# Patient Record
Sex: MALE | Race: WHITE | Employment: FULL TIME | URBAN - METROPOLITAN AREA
[De-identification: names, ages, dates, MRNs, and addresses within clinical notes are randomized per-mention and may not be internally consistent; named-entity substitution may affect disease eponyms.]

---

## 2022-04-11 ENCOUNTER — HOSPITAL ENCOUNTER (OUTPATIENT)
Dept: CT IMAGING | Facility: HOSPITAL | Age: 67
Discharge: HOME | End: 2022-04-11
Attending: INTERNAL MEDICINE
Payer: MEDICARE

## 2022-04-11 DIAGNOSIS — R31.0 GROSS HEMATURIA: ICD-10-CM

## 2022-04-11 LAB — CREAT BLD-MCNC: 0.8 MG/DL

## 2022-04-11 PROCEDURE — 74178 CT ABD&PLV WO CNTR FLWD CNTR: CPT | Performed by: INTERNAL MEDICINE

## 2022-04-11 PROCEDURE — 76377 3D RENDER W/INTRP POSTPROCES: CPT | Performed by: INTERNAL MEDICINE

## 2022-04-11 PROCEDURE — 82565 ASSAY OF CREATININE: CPT

## 2022-06-24 ENCOUNTER — APPOINTMENT (OUTPATIENT)
Dept: URBAN - METROPOLITAN AREA CLINIC 244 | Age: 67
Setting detail: DERMATOLOGY
End: 2022-06-28

## 2022-06-24 DIAGNOSIS — L82.1 OTHER SEBORRHEIC KERATOSIS: ICD-10-CM

## 2022-06-24 DIAGNOSIS — L57.0 ACTINIC KERATOSIS: ICD-10-CM

## 2022-06-24 DIAGNOSIS — D22 MELANOCYTIC NEVI: ICD-10-CM

## 2022-06-24 DIAGNOSIS — L81.4 OTHER MELANIN HYPERPIGMENTATION: ICD-10-CM

## 2022-06-24 PROBLEM — D22.5 MELANOCYTIC NEVI OF TRUNK: Status: ACTIVE | Noted: 2022-06-24

## 2022-06-24 PROBLEM — D23.71 OTHER BENIGN NEOPLASM OF SKIN OF RIGHT LOWER LIMB, INCLUDING HIP: Status: ACTIVE | Noted: 2022-06-24

## 2022-06-24 PROCEDURE — 99203 OFFICE O/P NEW LOW 30 MIN: CPT | Mod: 25

## 2022-06-24 PROCEDURE — OTHER COUNSELING: OTHER

## 2022-06-24 PROCEDURE — 17003 DESTRUCT PREMALG LES 2-14: CPT

## 2022-06-24 PROCEDURE — OTHER LIQUID NITROGEN: OTHER

## 2022-06-24 PROCEDURE — 17000 DESTRUCT PREMALG LESION: CPT

## 2022-06-24 ASSESSMENT — LOCATION SIMPLE DESCRIPTION DERM
LOCATION SIMPLE: ANTERIOR SCALP
LOCATION SIMPLE: CHEST
LOCATION SIMPLE: SCALP
LOCATION SIMPLE: LEFT UPPER BACK
LOCATION SIMPLE: RIGHT UPPER BACK

## 2022-06-24 ASSESSMENT — LOCATION DETAILED DESCRIPTION DERM
LOCATION DETAILED: RIGHT CENTRAL PARIETAL SCALP
LOCATION DETAILED: RIGHT SUPERIOR MEDIAL UPPER BACK
LOCATION DETAILED: MID-FRONTAL SCALP
LOCATION DETAILED: LEFT MEDIAL UPPER BACK
LOCATION DETAILED: UPPER STERNUM

## 2022-06-24 ASSESSMENT — LOCATION ZONE DERM
LOCATION ZONE: SCALP
LOCATION ZONE: TRUNK

## 2022-06-24 NOTE — PROCEDURE: LIQUID NITROGEN
Render In Bullet Format When Appropriate: No
Post-Care Instructions: I reviewed with the patient in detail post-care instructions. Patient is to wear sunprotection, and avoid picking at any of the treated lesions. Pt may apply Vaseline to crusted or scabbing areas.
Total Number Of Aks Treated: 5
Consent: The patient's consent was obtained including but not limited to risks of crusting, scabbing, blistering, scarring, darker or lighter pigmentary change, recurrence, incomplete removal and infection.
Detail Level: Zone
Duration Of Freeze Thaw-Cycle (Seconds): 0

## 2022-10-20 ENCOUNTER — OFFICE VISIT (OUTPATIENT)
Dept: INTERNAL MEDICINE CLINIC | Facility: CLINIC | Age: 67
End: 2022-10-20
Payer: MEDICARE

## 2022-10-20 VITALS
HEART RATE: 79 BPM | SYSTOLIC BLOOD PRESSURE: 130 MMHG | BODY MASS INDEX: 28.39 KG/M2 | DIASTOLIC BLOOD PRESSURE: 88 MMHG | WEIGHT: 183 LBS | HEIGHT: 67.5 IN | TEMPERATURE: 97 F | OXYGEN SATURATION: 97 %

## 2022-10-20 DIAGNOSIS — N40.0 ENLARGED PROSTATE: ICD-10-CM

## 2022-10-20 DIAGNOSIS — M79.604 BILATERAL LEG PAIN: ICD-10-CM

## 2022-10-20 DIAGNOSIS — M79.605 BILATERAL LEG PAIN: ICD-10-CM

## 2022-10-20 DIAGNOSIS — Z00.00 ROUTINE HEALTH MAINTENANCE: ICD-10-CM

## 2022-10-20 DIAGNOSIS — Z12.11 COLON CANCER SCREENING: ICD-10-CM

## 2022-10-20 DIAGNOSIS — C67.9 MALIGNANT NEOPLASM OF URINARY BLADDER, UNSPECIFIED SITE (HCC): ICD-10-CM

## 2022-10-20 DIAGNOSIS — I45.10 INCOMPLETE RBBB: ICD-10-CM

## 2022-10-20 DIAGNOSIS — E78.5 HYPERLIPIDEMIA, UNSPECIFIED HYPERLIPIDEMIA TYPE: ICD-10-CM

## 2022-10-20 DIAGNOSIS — Z00.00 MEDICARE ANNUAL WELLNESS VISIT, SUBSEQUENT: Primary | ICD-10-CM

## 2022-10-20 DIAGNOSIS — S32.030A CLOSED COMPRESSION FRACTURE OF L3 VERTEBRA, INITIAL ENCOUNTER (HCC): ICD-10-CM

## 2022-11-10 ENCOUNTER — OFFICE VISIT (OUTPATIENT)
Dept: PHYSICAL MEDICINE AND REHAB | Facility: CLINIC | Age: 67
End: 2022-11-10
Payer: MEDICARE

## 2022-11-10 VITALS
HEIGHT: 67.5 IN | SYSTOLIC BLOOD PRESSURE: 114 MMHG | WEIGHT: 185 LBS | BODY MASS INDEX: 28.7 KG/M2 | DIASTOLIC BLOOD PRESSURE: 66 MMHG

## 2022-11-10 DIAGNOSIS — M54.16 LUMBAR RADICULOPATHY: Primary | ICD-10-CM

## 2022-11-10 PROCEDURE — 99203 OFFICE O/P NEW LOW 30 MIN: CPT | Performed by: PHYSICAL MEDICINE & REHABILITATION

## 2022-11-10 NOTE — PATIENT INSTRUCTIONS
Xray today  Start PT at [facility]  Follow up with me in 6 weeks and we will discuss escalation of care as needed.

## 2022-11-11 ENCOUNTER — HOSPITAL ENCOUNTER (OUTPATIENT)
Dept: GENERAL RADIOLOGY | Facility: HOSPITAL | Age: 67
Discharge: HOME | End: 2022-11-11
Attending: PHYSICAL MEDICINE & REHABILITATION
Payer: MEDICARE

## 2022-11-11 DIAGNOSIS — M54.16 LUMBAR RADICULOPATHY: ICD-10-CM

## 2022-11-11 PROCEDURE — 72110 X-RAY EXAM L-2 SPINE 4/>VWS: CPT | Performed by: PHYSICAL MEDICINE & REHABILITATION

## 2022-11-16 ENCOUNTER — MED REC SCAN ONLY (OUTPATIENT)
Dept: PHYSICAL MEDICINE AND REHAB | Facility: CLINIC | Age: 67
End: 2022-11-16

## 2022-11-30 ENCOUNTER — PATIENT MESSAGE (OUTPATIENT)
Dept: PHYSICAL MEDICINE AND REHAB | Facility: CLINIC | Age: 67
End: 2022-11-30

## 2022-12-01 ENCOUNTER — PATIENT MESSAGE (OUTPATIENT)
Dept: PHYSICAL MEDICINE AND REHAB | Facility: CLINIC | Age: 67
End: 2022-12-01

## 2022-12-01 NOTE — TELEPHONE ENCOUNTER
From: Meche Knapp  To: Brooke Rucker DO  Sent: 11/30/2022 6:13 PM CST  Subject: Sciatica     Hello Doc. I have been in therapy now for 3 weeks. It seems like I am not seeing the improvement I was hoping for, maybe going the other way. Is it time for \"The Shot\", and then maybe some therapy? ??   I have had PT in the past for other issues, generally I see results fairly soon, this is why I am sending this.

## 2022-12-03 ENCOUNTER — PATIENT MESSAGE (OUTPATIENT)
Dept: PHYSICAL MEDICINE AND REHAB | Facility: CLINIC | Age: 67
End: 2022-12-03

## 2022-12-05 NOTE — TELEPHONE ENCOUNTER
From: Ranjana Ryan  To: Dominik Barron DO  Sent: 12/1/2022 4:39 PM CST  Subject: Appointment    I tried calling the office, Fifteen Minute wait, Then disconnected. Please have the DrErika try my cell [phone] FaceTime if you would like.

## 2022-12-05 NOTE — TELEPHONE ENCOUNTER
Message sent to Jona Jacobson to see if we can schedule a sooner f/u appt. than 12/20/22.  Awaiting recommendations from Dr. Jona Masterson.

## 2022-12-06 NOTE — TELEPHONE ENCOUNTER
From: Tamara Oreilly  To: Jimenez Dobson DO  Sent: 12/3/2022 12:59 PM CST  Subject: Sciatic    Hello, The therapist and I discussed where I'm at now, The progress at this point doesn't seem to show that more therapy will be helpful. I am still in a significant amount of discomfort. My appoinment with you is for the 10th, Can this be moved up. ?

## 2022-12-08 ENCOUNTER — OFFICE VISIT (OUTPATIENT)
Dept: PHYSICAL MEDICINE AND REHAB | Facility: CLINIC | Age: 67
End: 2022-12-08
Payer: MEDICARE

## 2022-12-08 VITALS
SYSTOLIC BLOOD PRESSURE: 130 MMHG | DIASTOLIC BLOOD PRESSURE: 78 MMHG | WEIGHT: 185 LBS | BODY MASS INDEX: 28.7 KG/M2 | HEIGHT: 67.5 IN

## 2022-12-08 DIAGNOSIS — M54.41 CHRONIC BILATERAL LOW BACK PAIN WITH BILATERAL SCIATICA: ICD-10-CM

## 2022-12-08 DIAGNOSIS — M54.42 CHRONIC BILATERAL LOW BACK PAIN WITH BILATERAL SCIATICA: ICD-10-CM

## 2022-12-08 DIAGNOSIS — R20.2 BILATERAL LEG PARESTHESIA: Primary | ICD-10-CM

## 2022-12-08 DIAGNOSIS — G89.29 CHRONIC BILATERAL LOW BACK PAIN WITH BILATERAL SCIATICA: ICD-10-CM

## 2022-12-08 PROCEDURE — 99214 OFFICE O/P EST MOD 30 MIN: CPT | Performed by: PHYSICAL MEDICINE & REHABILITATION

## 2022-12-08 RX ORDER — GABAPENTIN 300 MG/1
300 CAPSULE ORAL 3 TIMES DAILY
Qty: 270 CAPSULE | Refills: 0 | Status: SHIPPED | OUTPATIENT
Start: 2022-12-08 | End: 2023-03-08

## 2022-12-08 NOTE — PROGRESS NOTES
RETURN PATIENT VISIT    CHIEF COMPLAINT  Bilateral leg aching with pain radiating to calf on R>L    INTERVAL HISTORY  Carina Escudero is a 78year old who was last seen in clinic on 11/10/2022. Patient completed physical therapy at [facility]. Of physical therapy for 8 sessions without improvement in his symptoms. He continues to complain of bilateral leg aching pain radiating from the bilateral buttocks to the bilateral calfs. Symptoms on the right are worse than the left. He does not have significant numbness and tingling mostly an ache. He does endorse some pulling sensation on the posterior right knee with stretching. He has been relatively resting after physical therapy and has noted some improvement in his symptoms however he still feels pain with prolonged standing. He endorses improvement with sitting. Other exacerbating factors include getting in and out of a car rising from a seated position. Physical activity. He denies red flags including progressive weakness, progressive numbness or bowel bladder dysfunction. REVIEW OF SYSTEMS  Review of systems was completed with the patient today as pertinent to today's visit    PHYSICAL EXAMINATION  CONSTITUTIONAL: Well-appearing, in no apparent distress  EYES: No scleral icterus or conjunctival hemorrhage  CARDIOVASCULAR: Skin warm and well-perfused, no peripheral edema  RESPIRATORY: Breathing unlabored without accessory muscle use  PSYCHIATRIC: Alert, cooperative, appropriate mood and affect  SKIN: No lesions or rashes on exposed skin  MUSCULOSKELETAL: Patient with decent range of motion of the lumbar spine, no significant exacerbation with flexion or extension. NEUROLOGIC: 5/5 strength in bilateral lower extremities. Sensation grossly tact light touch. Reflexes are intact and symmetric. Slump sit produces painful stretch sensation in the right lower extremity, negative on the left.     REVIEW OF PRIOR X-RAYS/STUDIES  I reviewed the radiographs of the lumbar spine obtained at last visit dated 11/11/2022, my independent interpretation is moderate degenerative change throughout the lumbar spine from L1-S1. Endplate spurring with facet arthropathy at L5-S1. No abnormal motion on flexion or extension. IMPRESSION/DIAGNOSIS  Bilateral leg paresthesia  (primary encounter diagnosis)  Chronic bilateral low back pain with bilateral sciatica      TREATMENT/PLAN  As patient has not progressed with physical therapy and other conservative treatments and still complains of bilateral lower extremity radicular symptoms in the setting of known bony pathology on x-ray, I will order an MRI of the lumbar spine evaluating neural structures. This will aid with injection planning. Additionally I will start the patient on gabapentin 300 mg at night to be titrated up to 300 mg 3 times a day. We will follow-up by video visit after the MRI is completed to discuss the results and to discuss next steps. Education was provided regarding the above impression/diagnosis and treatment options/plan were discussed. All questions were answered during today's visit. Patient will contact clinic if any other questions or concerns.     Pura Zuniga DO  Physical Medicine and Rehabilitation / [facility]

## 2022-12-08 NOTE — PATIENT INSTRUCTIONS
Start gabapentin at night    Please start gabapentin titration. DAYS  AM  NOON  PM   1-3 - - 300mg   4-6 300mg - 300mg   7+ 300mg 300mg 300mg                       2. Get an MRI of your lumbar spine I will reach out with these results uand have a video visit to talk about our plans for injections if indicated.

## 2022-12-09 ENCOUNTER — PATIENT MESSAGE (OUTPATIENT)
Dept: PHYSICAL MEDICINE AND REHAB | Facility: CLINIC | Age: 67
End: 2022-12-09

## 2022-12-13 RX ORDER — DIAZEPAM 5 MG/1
5 TABLET ORAL AS DIRECTED
Qty: 2 TABLET | Refills: 0 | Status: SHIPPED | OUTPATIENT
Start: 2022-12-13

## 2022-12-13 NOTE — TELEPHONE ENCOUNTER
Please place order for Valium prior to MRI. Please notify this MA upon completion to notify patient of instructions.

## 2022-12-13 NOTE — TELEPHONE ENCOUNTER
From: Edy Ocampo  To: Tani Price DO  Sent: 12/9/2022 12:45 PM CST  Subject: Liudmila Hilton Dr. I just scheduled my MRI for the 20th of december at the [facility]. I did not mention to you, an oversight, I am claustrophobic and maybe a little anxiety. Can you help for this MRI.  I have had a valium in the past.

## 2022-12-20 ENCOUNTER — HOSPITAL ENCOUNTER (OUTPATIENT)
Dept: MRI IMAGING | Age: 67
Discharge: HOME | End: 2022-12-20
Attending: PHYSICAL MEDICINE & REHABILITATION
Payer: MEDICARE

## 2022-12-20 ENCOUNTER — MED REC SCAN ONLY (OUTPATIENT)
Dept: PHYSICAL MEDICINE AND REHAB | Facility: CLINIC | Age: 67
End: 2022-12-20

## 2022-12-20 ENCOUNTER — PATIENT MESSAGE (OUTPATIENT)
Dept: PHYSICAL MEDICINE AND REHAB | Facility: CLINIC | Age: 67
End: 2022-12-20

## 2022-12-20 DIAGNOSIS — R20.2 BILATERAL LEG PARESTHESIA: ICD-10-CM

## 2022-12-20 DIAGNOSIS — M54.16 LUMBAR RADICULOPATHY: Primary | ICD-10-CM

## 2022-12-20 DIAGNOSIS — M54.41 CHRONIC BILATERAL LOW BACK PAIN WITH BILATERAL SCIATICA: ICD-10-CM

## 2022-12-20 DIAGNOSIS — M54.42 CHRONIC BILATERAL LOW BACK PAIN WITH BILATERAL SCIATICA: ICD-10-CM

## 2022-12-20 DIAGNOSIS — G89.29 CHRONIC BILATERAL LOW BACK PAIN WITH BILATERAL SCIATICA: ICD-10-CM

## 2022-12-20 DIAGNOSIS — M54.16 LUMBAR RADICULOPATHY: ICD-10-CM

## 2022-12-20 PROCEDURE — 72148 MRI LUMBAR SPINE W/O DYE: CPT | Performed by: PHYSICAL MEDICINE & REHABILITATION

## 2022-12-20 NOTE — IMAGING NOTE
EXAM NOT COMPLETE- Pt became claustro and stopped exam-he could not continue,  Pt was sweating due to anxiety, could not tolerate being in the scanner any longer. Pt  could not continue  despite taking anti-claustro meds and using coping skills. Pt will call  and talk about options of anesthesia vs finding an OPEN scanner.

## 2022-12-21 ENCOUNTER — TELEPHONE (OUTPATIENT)
Dept: PHYSICAL MEDICINE AND REHAB | Facility: CLINIC | Age: 67
End: 2022-12-21

## 2022-12-21 ENCOUNTER — PATIENT MESSAGE (OUTPATIENT)
Dept: PHYSICAL MEDICINE AND REHAB | Facility: CLINIC | Age: 67
End: 2022-12-21

## 2022-12-21 RX ORDER — TRAMADOL HYDROCHLORIDE 50 MG/1
50 TABLET ORAL EVERY 12 HOURS PRN
Qty: 28 TABLET | Refills: 0 | Status: SHIPPED | OUTPATIENT
Start: 2022-12-21 | End: 2023-01-04

## 2022-12-21 NOTE — TELEPHONE ENCOUNTER
S/w patient who verbalizes understanding with MRI order & was given central scheduling's number by this RN. Gabapentin 300MG TID w/o relief. Ibuprofen w/o relief. Heat w/ only slight relief when sitting. Pt reporting pain is the same location and description and is at it's worst when standing. Pt inquiring if he could have an injection to his back as \"this is our job. \" Educated patient that the reason the MRI is indicated is to determine if an injection is appropriate for patient's condition or not. Pt frustrated but verbalized understanding. Patient wanting to know if there is anything else patient can take for the pain while he awaits his newly ordered MRI. Instructed patient that I would send patient's request to Dr. Charles Mitchell for further recommendations at this time.

## 2022-12-21 NOTE — TELEPHONE ENCOUNTER
See patient message encounter 12/20/2022 - already sent to Dr. Alhaji Nguyen for his recommendations. Currently waiting for Dr. Renzo Hawkins response at this time.

## 2022-12-21 NOTE — TELEPHONE ENCOUNTER
From: Lorelie Phalen  To: Lenka Hurst DO  Sent: 12/21/2022 3:54 PM CST  Subject: MRI    You and your facility does NOT have an open MRI Any Longer. ... The suggestion is MRI Under anesthetic. I just need to do something sooner than later.

## 2022-12-21 NOTE — TELEPHONE ENCOUNTER
From: Rosaura Roderick  To: Sergio Mcleod DO  Sent: 12/20/2022 10:35 AM CST  Subject: MRI    Good Morning, I did my best today, took the valium as prescribed . After the first set of images, anxiety took over. What's next, an open MRI? or a MRI with anesthesia. The discomfort seems to be getting worse, The Gabapentin, Doesn't seem to be much help either. I am Open to any ideas, but leaning to you giving me the shot of relief sooner than later,. Or other medication that can help me. Please. ...   Thank you,  Bola Gudino

## 2022-12-21 NOTE — TELEPHONE ENCOUNTER
Pt called and had MRI 12/20/22 and was unable to complete due to anxiety. In a lot of pain- 7/10 pain level- please call to advise.

## 2022-12-21 NOTE — TELEPHONE ENCOUNTER
Error in RN instruction - [facility] does not provide Open MRI.  Pt was given instruction to call [facility] in [place].    Will fax to [facility] at number: [phone]  Faxed via [facility]

## 2022-12-22 ENCOUNTER — TELEPHONE (OUTPATIENT)
Dept: PHYSICAL MEDICINE AND REHAB | Facility: CLINIC | Age: 67
End: 2022-12-22

## 2022-12-27 ENCOUNTER — TELEPHONE (OUTPATIENT)
Dept: PHYSICAL MEDICINE AND REHAB | Facility: CLINIC | Age: 67
End: 2022-12-27

## 2022-12-29 ENCOUNTER — OFFICE VISIT (OUTPATIENT)
Dept: PHYSICAL MEDICINE AND REHAB | Facility: CLINIC | Age: 67
End: 2022-12-29
Payer: MEDICARE

## 2022-12-29 VITALS — HEIGHT: 67.5 IN | WEIGHT: 185 LBS | BODY MASS INDEX: 28.7 KG/M2

## 2022-12-29 DIAGNOSIS — M54.41 CHRONIC BILATERAL LOW BACK PAIN WITH BILATERAL SCIATICA: ICD-10-CM

## 2022-12-29 DIAGNOSIS — M48.062 SPINAL STENOSIS OF LUMBAR REGION WITH NEUROGENIC CLAUDICATION: Primary | ICD-10-CM

## 2022-12-29 DIAGNOSIS — R20.2 BILATERAL LEG PARESTHESIA: ICD-10-CM

## 2022-12-29 DIAGNOSIS — M54.42 CHRONIC BILATERAL LOW BACK PAIN WITH BILATERAL SCIATICA: ICD-10-CM

## 2022-12-29 DIAGNOSIS — G89.29 CHRONIC BILATERAL LOW BACK PAIN WITH BILATERAL SCIATICA: ICD-10-CM

## 2022-12-29 PROCEDURE — 99214 OFFICE O/P EST MOD 30 MIN: CPT | Performed by: PHYSICAL MEDICINE & REHABILITATION

## 2022-12-29 NOTE — PROGRESS NOTES
RETURN PATIENT VISIT    CHIEF COMPLAINT  Low back pain, spinal stenosis with neurogenic claudication    INTERVAL HISTORY  Jama Treviño is a 78year old who was last seen in clinic on 12/8/2022. At that visit MRI of the lumbar spine was obtained with suspicion of spinal stenosis. This is reviewed in full below with the patient. Patient states that his condition is largely the same, significant limited in his day-to-day activities. He is continued with gabapentin as well as tramadol and denies significant improvement in his symptoms. He is interested in escalating his care with potential spinal intervention. REVIEW OF SYSTEMS  Review of systems was completed with the patient today as pertinent to today's visit    PHYSICAL EXAMINATION  CONSTITUTIONAL: Well-appearing, in no apparent distress  EYES: No scleral icterus or conjunctival hemorrhage  CARDIOVASCULAR: Skin warm and well-perfused, no peripheral edema  RESPIRATORY: Breathing unlabored without accessory muscle use  PSYCHIATRIC: Alert, cooperative, appropriate mood and affect  SKIN: No lesions or rashes on exposed skin  MUSCULOSKELETAL: Tenderness palpation over the lower paraspinal musculature, decent range of motion of lumbar spine. NEUROLOGIC: 5/5 strength bilateral lower extremities, neural tension signs are negative. REVIEW OF PRIOR X-RAYS/STUDIES  I reviewed the MRI of the lumbar spine dated 12/22/2022 which reveals multilevel degenerative change significant disc pathology with central canal stenosis most notable at L4-5 and L5-S1. There is bilateral S1 nerve root encroachment with impingement and moderate central canal stenosis at L5-S1. There is severe bilateral neuroforaminal stenosis and severe central canal stenosis at L4-5. There is moderate bilaterally severe bilateral neuroforaminal stenosis at L3-4.     IMPRESSION/DIAGNOSIS  Spinal stenosis of lumbar region with neurogenic claudication  (primary encounter diagnosis)  Bilateral leg paresthesia  Chronic bilateral low back pain with bilateral sciatica      TREATMENT/PLAN  Long discussion was had with the patient and his wife about his spinal pathology which is likely resulting in his complaints in the form of neurogenic claudication. Risks, benefits and alternatives of spinal intervention were discussed with the patient and he would like to proceed. We will schedule the patient for an L5-S1 interlaminar epidural steroid injection with fluoroscopic guidance using local anesthesia. He will follow-up with me for the above injection and then 2 weeks afterwards for an update on his progress. At that time we will likely initiate physical therapy. Education was provided regarding the above impression/diagnosis and treatment options/plan were discussed. All questions were answered during today's visit. Patient will contact clinic if any other questions or concerns.     Jyoti Webber DO  Physical Medicine and Rehabilitation / [facility]

## 2023-01-02 ENCOUNTER — PATIENT MESSAGE (OUTPATIENT)
Dept: PHYSICAL MEDICINE AND REHAB | Facility: CLINIC | Age: 68
End: 2023-01-02

## 2023-01-03 RX ORDER — TRAMADOL HYDROCHLORIDE 50 MG/1
50 TABLET ORAL EVERY 12 HOURS PRN
Qty: 28 TABLET | Refills: 0 | Status: SHIPPED | OUTPATIENT
Start: 2023-01-03 | End: 2023-01-17

## 2023-01-04 ENCOUNTER — PATIENT MESSAGE (OUTPATIENT)
Dept: PHYSICAL MEDICINE AND REHAB | Facility: CLINIC | Age: 68
End: 2023-01-04

## 2023-01-04 ENCOUNTER — TELEPHONE (OUTPATIENT)
Dept: PHYSICAL MEDICINE AND REHAB | Facility: CLINIC | Age: 68
End: 2023-01-04

## 2023-01-04 NOTE — TELEPHONE ENCOUNTER
Patient has been scheduled for  L4/5 Interlaminar epidural steroid injection on 1/17/23 at the [address]  with [facility].   -Anesthesia type: LOCAL. -If scheduling [facility] covid testing required for all procedures whether patient is vaccinated or not. -Patient informed not to eat or drink anything after midnight the night prior to the procedure, if being sedated. (Patient informed to fast 12 hours prior to procedure with IVCS/MAC. )  -Patient was advised that if he/she does receive the covid vaccine it needs to be at least 2 weeks before or after the injection. -Medications and allergies reviewed. -Patient reminded to hold NSAIDs (Ibuprofen, ASA 81, Aleve, Naproxen, Mobic, Diclofenac, Etodolac, Celebrex etc.) for 3 days prior to [place]  if BMI is greater than 35. For Cervical injections only hold multivitamins, Vitamin E, Fish Oil, Phentermine (Lomaira) for 7 days prior to injection and NSAIDS.  mg to be held for 7 days prior to injections.  -If patient is receiving MAC/IVCS Phentermine Benjamen Herd) will need to be held for 7 days prior to injection.  -If on blood thinner clearance has been received to hold this medication by provider.   -Patient informed he/she will need a  to and from procedure. -[facility] is located in the [facility] 1st floor. Patient may park in the yellow parking. Patient verbalized understanding and agrees with plan.  -----> Scheduled in Epic: Yes  -----> Scheduled in [employer]:  Yes

## 2023-01-04 NOTE — TELEPHONE ENCOUNTER
Per Medicare Guidelines -no authorization is required for  L4/5 Interlaminar epidural steroid injection, fluoroscopic guidance CPT 26562    Status: Authorization is not required however may be subject to review once claim is submitted-Covered Benefit

## 2023-01-05 ENCOUNTER — TELEPHONE (OUTPATIENT)
Dept: NEUROLOGY | Facility: CLINIC | Age: 68
End: 2023-01-05

## 2023-01-05 ENCOUNTER — PATIENT MESSAGE (OUTPATIENT)
Dept: INTERNAL MEDICINE CLINIC | Facility: CLINIC | Age: 68
End: 2023-01-05

## 2023-01-05 NOTE — TELEPHONE ENCOUNTER
From: Clementina Hollingsworth  To: Tiff Weiss DO  Sent: 1/4/2023 2:49 PM CST  Subject: Jose M Hdz Dr.,   I just got the call from [facility] in your office about scheduling the SHOT. Today is the 4th, the first Available time is the 17th. Is there anything you can do, to make this happen sooner? I have been very open as to the discomfort I am in. I am not looking forward to two more weeks of this. Please Help.

## 2023-01-05 NOTE — TELEPHONE ENCOUNTER
Patient calling to see if he can have his apt sooner that 1/17/22 as the pain has increased dramatically and he can't wait that more.

## 2023-01-06 NOTE — TELEPHONE ENCOUNTER
From: Edy Ocampo  To: Prudencio Campbell MD  Sent: 1/5/2023 6:01 PM CST  Subject: MRI Results    Hello Dr. Jerry Mendez,   I had an MRI taken for my Lower Back, showing the problem causing the Sciatic Issue. I am sending you a copy of the results, my question is about the cyst on my Kidney . Dr. Fabiano Maynard has not addressed this at all, probably because this has nothing to do with the sciatic. Any thoughts?

## 2023-01-09 ENCOUNTER — PATIENT MESSAGE (OUTPATIENT)
Dept: PHYSICAL MEDICINE AND REHAB | Facility: CLINIC | Age: 68
End: 2023-01-09

## 2023-01-09 ENCOUNTER — OFFICE VISIT (OUTPATIENT)
Dept: SURGERY | Facility: CLINIC | Age: 68
End: 2023-01-09
Payer: MEDICARE

## 2023-01-09 DIAGNOSIS — M48.062 SPINAL STENOSIS OF LUMBAR REGION WITH NEUROGENIC CLAUDICATION: Primary | ICD-10-CM

## 2023-01-09 PROCEDURE — 62323 NJX INTERLAMINAR LMBR/SAC: CPT | Performed by: PHYSICAL MEDICINE & REHABILITATION

## 2023-01-09 NOTE — PROCEDURES
[address]    LUMBAR INTERLAMINAR  NAME:  Shaye Barron    MR #:    [id number] :  1955     PHYSICIAN:  Gabrielle Davenport DO        Operative Report    DATE OF PROCEDURE: 2023   PREOPERATIVE DIAGNOSES: 1. Spinal stenosis of lumbar region with neurogenic claudication        POSTOPERATIVE DIAGNOSES:   1. Spinal stenosis of lumbar region with neurogenic claudication        PROCEDURES: L5-S1 interlaminar epidural steroid injection done under fluoroscopic guidance with contrast enhancement. SURGEON: Gabrielle Davenport DO   ANESTHESIA: Local   INDICATIONS:       Initial order was for L4/5 ILESI, however due to anatomy, level was changed intra-op to target the L5/S1 interlaminar space. OPERATIVE PROCEDURE:  Written consent was obtained from the patient. The patient was brought into the operating room and placed in the prone position on the fluoroscopy table with pillow underneath the chest and shoulders. The patient's skin was cleaned and draped in a normal sterile fashion. Using AP fluoroscopy, all 5 lumbar vertebrae were identified. The left L5 lamina was identified. Skin was anesthetized with 1% PF lidocaine without epinephrine. Then, a 3-1/2 inch, 18-gauge Tuohy needle was inserted and directed towards the left paracentral L5/S1 interlaminar space. Using loss of resistance technique, the epidural space was entered. Then, Omnipaque-240 contrast was used to obtain a good epidurogram indicating correct needle placement. Then, aspiration was performed. No blood, fluid, or air was aspirated. Then, the patient was injected with a 4 cc solution of 2 cc of 1% PF lidocaine without epinephrine, and 2 cc of 6 mg/cc of Celestone Soluspan. Then, the needle was removed. The patient's skin was cleaned. A Band-Aid was applied. The patient was transferred to the cart and into [facility]. The patient was given discharge instructions and will follow up in the clinic as scheduled.   Throughout the whole procedure, the patient's pulse oximetry and vital signs were monitored and they remained completely stable. Also, throughout the whole procedure, prior to injection of any medication, aspiration was performed. No blood, fluid, or air was aspirated at anytime.           Lucas Barnett DO  Physical Medicine and Rehabilitation / [facility]

## 2023-01-10 NOTE — TELEPHONE ENCOUNTER
From: Tommie Holman  To: Timo Mcpherson DO  Sent: 1/9/2023 2:37 PM CST  Subject: MRI     Hello , at my last office visit with Dr Charles Mitchell, I brought in an MRI scan on disc. The Dr had the disc uploaded and we looked at it on his office computer. Can that MRI be put into My Chart? Under test results.  The test was done outside the hospital.  Thank you,  Levar Spann

## 2023-01-11 ENCOUNTER — HOSPITAL ENCOUNTER (OUTPATIENT)
Dept: ULTRASOUND IMAGING | Age: 68
Discharge: HOME | End: 2023-01-11
Attending: INTERNAL MEDICINE
Payer: MEDICARE

## 2023-01-11 DIAGNOSIS — N28.1 RENAL CYST: ICD-10-CM

## 2023-01-11 PROCEDURE — 76775 US EXAM ABDO BACK WALL LIM: CPT | Performed by: INTERNAL MEDICINE

## 2023-01-12 ENCOUNTER — MED REC SCAN ONLY (OUTPATIENT)
Dept: PHYSICAL MEDICINE AND REHAB | Facility: CLINIC | Age: 68
End: 2023-01-12

## 2023-01-12 ENCOUNTER — TELEPHONE (OUTPATIENT)
Dept: INTERNAL MEDICINE CLINIC | Facility: CLINIC | Age: 68
End: 2023-01-12

## 2023-01-12 NOTE — TELEPHONE ENCOUNTER
Patient called and relayed Dr Jennie Reddy message. Patient verbalized understanding with no further questions noted.

## 2023-01-12 NOTE — TELEPHONE ENCOUNTER
Please let patient know that his kidney ultrasound came out good. He has some cysts in both kidneys but these are what are called \"simple\" cysts. These do not need any follow-up. They are fairly common.

## 2023-01-23 ENCOUNTER — OFFICE VISIT (OUTPATIENT)
Dept: PHYSICAL MEDICINE AND REHAB | Facility: CLINIC | Age: 68
End: 2023-01-23
Payer: MEDICARE

## 2023-01-23 VITALS
RESPIRATION RATE: 16 BRPM | HEIGHT: 67 IN | SYSTOLIC BLOOD PRESSURE: 116 MMHG | DIASTOLIC BLOOD PRESSURE: 70 MMHG | OXYGEN SATURATION: 96 % | HEART RATE: 78 BPM | WEIGHT: 178 LBS | BODY MASS INDEX: 27.94 KG/M2

## 2023-01-23 DIAGNOSIS — M48.062 SPINAL STENOSIS OF LUMBAR REGION WITH NEUROGENIC CLAUDICATION: Primary | ICD-10-CM

## 2023-01-23 PROCEDURE — 99214 OFFICE O/P EST MOD 30 MIN: CPT | Performed by: PHYSICAL MEDICINE & REHABILITATION

## 2023-01-23 NOTE — PROGRESS NOTES
RETURN PATIENT VISIT    CHIEF COMPLAINT  Low back and bilateral lower extremity radicular symptoms right greater than left    INTERVAL HISTORY  Harshal Pascual is a 78year old who was last seen in clinic on 1/9/2023 for an L5-S1 interlaminar epidural steroid injection. Patient does not endorse any significant improvement with this injection. He states that he continues to be bothered by his symptoms in the right lower extremity primarily throughout the day, significantly limited in his activity. At last visit we discussed a trial of 1 epidural steroid injection prior to discussion with spine surgeons. He continues to take tramadol as well as gabapentin as needed for pain however he is not controlled on these medications. He denies any bowel or bladder dysfunction, progressive weakness or progressive numbness. Pain is mostly radiating and low back to the buttocks mostly on the right side down into the thigh with ambulation as well as other activity. REVIEW OF SYSTEMS  Review of systems was completed with the patient today as pertinent to today's visit    PHYSICAL EXAMINATION  CONSTITUTIONAL: Well-appearing, in no apparent distress  EYES: No scleral icterus or conjunctival hemorrhage  CARDIOVASCULAR: Skin warm and well-perfused, no peripheral edema  RESPIRATORY: Breathing unlabored without accessory muscle use  PSYCHIATRIC: Alert, cooperative, appropriate mood and affect  SKIN: No lesions or rashes on exposed skin  MUSCULOSKELETAL: Decent range of motion lumbar spine in flexion extension, exacerbation of axial low back complaints with deep flexion as well as slightly with extension. Tenderness palpation lumbar paraspinal musculature  NEUROLOGIC: Patient remains strong in the bilateral lower extremities, very slight right-sided plantarflexion 4+/5 stable from previous visits, sensation grossly intact light touch.     REVIEW OF PRIOR X-RAYS/STUDIES  I reviewed the MRI of the lumbar spine dated 12/22/2022, [place] interpretation is multilevel degenerative change with Modic changes of the endplates of the mid to the lumbar spine. There is disc desiccation and diffuse disc bulging from L1-S1. This results in varying degrees of central and neuroforaminal stenosis. At L2-3, L3-4 there is mild central canal stenosis with moderate bilateral neuroforaminal stenosis. At L4-5 there is diffuse disc bulging and a disc osteophyte complex resulting in severe bilateral neuroforaminal stenosis as well as moderate to severe central canal stenosis. There is right greater than left neuroforaminal stenosis and lateral recess stenosis at L5-S1 secondary to disc osteophyte complex, there is mild central canal stenosis at this level    IMPRESSION/DIAGNOSIS  Spinal stenosis of lumbar region with neurogenic claudication  (primary encounter diagnosis)      TREATMENT/PLAN  Patient continues to deal with symptomatic spinal stenosis with neurogenic claudication symptoms in the setting of significant pathology throughout the lumbar spine as noted on most recent MRI of the lumbar spine. Patient is participating in physical therapy, try neuropathic medications, pain medications, epidural steroid injection without success in alleviating his symptoms. He remains significantly limited from a functional standpoint. Surgical consultation is reasonable, I have placed a referral to our neurosurgical group for evaluation, patient also plans to meet with a orthopedic spine surgeon from [address] [place] [address]. Education was provided regarding the above impression/diagnosis and treatment options/plan were discussed. All questions were answered during today's visit. Patient will contact clinic if any other questions or concerns.     Farzana Arenas DO  Physical Medicine and Rehabilitation / [facility]

## 2023-01-30 RX ORDER — TRAMADOL HYDROCHLORIDE 50 MG/1
TABLET ORAL
Qty: 28 TABLET | Refills: 0 | Status: SHIPPED | OUTPATIENT
Start: 2023-01-30

## 2023-01-30 NOTE — TELEPHONE ENCOUNTER
Refill Request    Medication request: traMADol 50 MG Oral Tab    LOV:1/23/2023 Hao Tilley DO   RTC: N/A  NOV: N/A     [place]Last [place]: 1/9/2023 #28 tablets 14 days    UDS: (if applicable): N/A  Pain contract: N/A    LOV plan (if weaning or changing medications): N/A

## 2023-02-03 ENCOUNTER — LAB ENCOUNTER (OUTPATIENT)
Dept: LAB | Age: 68
End: 2023-02-03
Attending: INTERNAL MEDICINE
Payer: MEDICARE

## 2023-02-03 ENCOUNTER — OFFICE VISIT (OUTPATIENT)
Dept: INTERNAL MEDICINE CLINIC | Facility: CLINIC | Age: 68
End: 2023-02-03

## 2023-02-03 ENCOUNTER — TELEPHONE (OUTPATIENT)
Dept: INTERNAL MEDICINE CLINIC | Facility: CLINIC | Age: 68
End: 2023-02-03

## 2023-02-03 VITALS
TEMPERATURE: 98 F | HEART RATE: 77 BPM | OXYGEN SATURATION: 99 % | DIASTOLIC BLOOD PRESSURE: 68 MMHG | HEIGHT: 67 IN | WEIGHT: 176 LBS | BODY MASS INDEX: 27.62 KG/M2 | SYSTOLIC BLOOD PRESSURE: 106 MMHG

## 2023-02-03 DIAGNOSIS — E78.5 HYPERLIPIDEMIA, UNSPECIFIED HYPERLIPIDEMIA TYPE: ICD-10-CM

## 2023-02-03 DIAGNOSIS — D68.9 COAGULATION DEFECT (HCC): ICD-10-CM

## 2023-02-03 DIAGNOSIS — Z01.818 PRE-OP EVALUATION: ICD-10-CM

## 2023-02-03 DIAGNOSIS — Z01.818 PRE-OP EVALUATION: Primary | ICD-10-CM

## 2023-02-03 DIAGNOSIS — Z23 NEED FOR VACCINATION AGAINST STREPTOCOCCUS PNEUMONIAE: ICD-10-CM

## 2023-02-03 LAB
ALBUMIN SERPL-MCNC: 3.9 G/DL (ref 3.4–5)
ALBUMIN/GLOB SERPL: 1.1 {RATIO} (ref 1–2)
ALP LIVER SERPL-CCNC: 69 U/L
ALT SERPL-CCNC: 41 U/L
ANION GAP SERPL CALC-SCNC: 8 MMOL/L (ref 0–18)
ANTIBODY SCREEN: NEGATIVE
APTT PPP: 31.6 SECONDS (ref 23.3–35.6)
AST SERPL-CCNC: 22 U/L (ref 15–37)
ATRIAL RATE: 68 BPM
BASOPHILS # BLD AUTO: 0.05 X10(3) UL (ref 0–0.2)
BASOPHILS NFR BLD AUTO: 0.4 %
BILIRUB SERPL-MCNC: 0.4 MG/DL (ref 0.1–2)
BILIRUB UR QL: NEGATIVE
BUN BLD-MCNC: 15 MG/DL (ref 7–18)
BUN/CREAT SERPL: 22.1 (ref 10–20)
CALCIUM BLD-MCNC: 9.6 MG/DL (ref 8.5–10.1)
CHLORIDE SERPL-SCNC: 107 MMOL/L (ref 98–112)
CHOLEST SERPL-MCNC: 187 MG/DL (ref ?–200)
CO2 SERPL-SCNC: 24 MMOL/L (ref 21–32)
COLOR UR: YELLOW
CREAT BLD-MCNC: 0.68 MG/DL
DEPRECATED RDW RBC AUTO: 39.8 FL (ref 35.1–46.3)
EOSINOPHIL # BLD AUTO: 0.08 X10(3) UL (ref 0–0.7)
EOSINOPHIL NFR BLD AUTO: 0.7 %
ERYTHROCYTE [DISTWIDTH] IN BLOOD BY AUTOMATED COUNT: 13 % (ref 11–15)
FASTING PATIENT LIPID ANSWER: NO
FASTING STATUS PATIENT QL REPORTED: NO
GFR SERPLBLD BASED ON 1.73 SQ M-ARVRAT: 103 ML/MIN/1.73M2 (ref 60–?)
GLOBULIN PLAS-MCNC: 3.6 G/DL (ref 2.8–4.4)
GLUCOSE BLD-MCNC: 104 MG/DL (ref 70–99)
GLUCOSE UR-MCNC: NEGATIVE MG/DL
HCT VFR BLD AUTO: 39.4 %
HDLC SERPL-MCNC: 45 MG/DL (ref 40–59)
HGB BLD-MCNC: 13.5 G/DL
HGB UR QL STRIP.AUTO: NEGATIVE
IMM GRANULOCYTES # BLD AUTO: 0.04 X10(3) UL (ref 0–1)
IMM GRANULOCYTES NFR BLD: 0.3 %
INR BLD: 1.08 (ref 0.85–1.16)
KETONES UR-MCNC: NEGATIVE MG/DL
LDLC SERPL CALC-MCNC: 121 MG/DL (ref ?–100)
LEUKOCYTE ESTERASE UR QL STRIP.AUTO: NEGATIVE
LYMPHOCYTES # BLD AUTO: 2.15 X10(3) UL (ref 1–4)
LYMPHOCYTES NFR BLD AUTO: 18.2 %
MCH RBC QN AUTO: 28.8 PG (ref 26–34)
MCHC RBC AUTO-ENTMCNC: 34.3 G/DL (ref 31–37)
MCV RBC AUTO: 84 FL
MONOCYTES # BLD AUTO: 1.52 X10(3) UL (ref 0.1–1)
MONOCYTES NFR BLD AUTO: 12.9 %
NEUTROPHILS # BLD AUTO: 7.97 X10 (3) UL (ref 1.5–7.7)
NEUTROPHILS # BLD AUTO: 7.97 X10(3) UL (ref 1.5–7.7)
NEUTROPHILS NFR BLD AUTO: 67.5 %
NITRITE UR QL STRIP.AUTO: NEGATIVE
NONHDLC SERPL-MCNC: 142 MG/DL (ref ?–130)
OSMOLALITY SERPL CALC.SUM OF ELEC: 289 MOSM/KG (ref 275–295)
P AXIS: 50 DEGREES
P-R INTERVAL: 162 MS
PH UR: 6 [PH] (ref 5–8)
PLATELET # BLD AUTO: 306 10(3)UL (ref 150–450)
POTASSIUM SERPL-SCNC: 3.6 MMOL/L (ref 3.5–5.1)
PROT SERPL-MCNC: 7.5 G/DL (ref 6.4–8.2)
PROT UR-MCNC: NEGATIVE MG/DL
PROTHROMBIN TIME: 13.9 SECONDS (ref 11.6–14.8)
Q-T INTERVAL: 388 MS
QRS DURATION: 100 MS
QTC CALCULATION (BEZET): 412 MS
R AXIS: -7 DEGREES
RBC # BLD AUTO: 4.69 X10(6)UL
RH BLOOD TYPE: POSITIVE
RH BLOOD TYPE: POSITIVE
SODIUM SERPL-SCNC: 139 MMOL/L (ref 136–145)
SP GR UR STRIP: 1.02 (ref 1–1.03)
T AXIS: 35 DEGREES
TRIGL SERPL-MCNC: 115 MG/DL (ref 30–149)
UROBILINOGEN UR STRIP-ACNC: <2
VENTRICULAR RATE: 68 BPM
VIT C UR-MCNC: 40 MG/DL
VLDLC SERPL CALC-MCNC: 20 MG/DL (ref 0–30)
WBC # BLD AUTO: 11.8 X10(3) UL (ref 4–11)

## 2023-02-03 PROCEDURE — 80053 COMPREHEN METABOLIC PANEL: CPT

## 2023-02-03 PROCEDURE — G0009 ADMIN PNEUMOCOCCAL VACCINE: HCPCS | Performed by: INTERNAL MEDICINE

## 2023-02-03 PROCEDURE — 86850 RBC ANTIBODY SCREEN: CPT

## 2023-02-03 PROCEDURE — 93005 ELECTROCARDIOGRAM TRACING: CPT

## 2023-02-03 PROCEDURE — 86901 BLOOD TYPING SEROLOGIC RH(D): CPT

## 2023-02-03 PROCEDURE — 81001 URINALYSIS AUTO W/SCOPE: CPT | Performed by: INTERNAL MEDICINE

## 2023-02-03 PROCEDURE — 80061 LIPID PANEL: CPT

## 2023-02-03 PROCEDURE — 86900 BLOOD TYPING SEROLOGIC ABO: CPT

## 2023-02-03 PROCEDURE — 93010 ELECTROCARDIOGRAM REPORT: CPT | Performed by: STUDENT IN AN ORGANIZED HEALTH CARE EDUCATION/TRAINING PROGRAM

## 2023-02-03 PROCEDURE — 85025 COMPLETE CBC W/AUTO DIFF WBC: CPT

## 2023-02-03 PROCEDURE — 85730 THROMBOPLASTIN TIME PARTIAL: CPT

## 2023-02-03 PROCEDURE — 99214 OFFICE O/P EST MOD 30 MIN: CPT | Performed by: INTERNAL MEDICINE

## 2023-02-03 PROCEDURE — 87641 MR-STAPH DNA AMP PROBE: CPT

## 2023-02-03 PROCEDURE — 36415 COLL VENOUS BLD VENIPUNCTURE: CPT

## 2023-02-03 PROCEDURE — 90677 PCV20 VACCINE IM: CPT | Performed by: INTERNAL MEDICINE

## 2023-02-03 PROCEDURE — 85610 PROTHROMBIN TIME: CPT

## 2023-02-03 NOTE — TELEPHONE ENCOUNTER
Please call Courtney Lamb in [facility] office and ask for office visit note. I am doing patient's preop evaluation.

## 2023-02-03 NOTE — TELEPHONE ENCOUNTER
Dr Milton Lim office called at [phone] and was placed on hold then call dropped x 2. Office faxed request at [phone] with confirmation received.

## 2023-02-04 LAB — MRSA DNA SPEC QL NAA+PROBE: NEGATIVE

## 2023-02-06 ENCOUNTER — TELEPHONE (OUTPATIENT)
Dept: INTERNAL MEDICINE CLINIC | Facility: CLINIC | Age: 68
End: 2023-02-06

## 2023-02-06 DIAGNOSIS — R79.89 ABNORMAL CBC: Primary | ICD-10-CM

## 2023-02-06 NOTE — TELEPHONE ENCOUNTER
----- Message from Rosaura Vaughn sent at 2/3/2023  8:49 PM CST -----  Regarding: Pain weakness  It seems that the situation I'm in is progressing to the worst. Is there anything I can take to relieve this pain.

## 2023-02-06 NOTE — TELEPHONE ENCOUNTER
Discussed with Marvel Trejo. 1.  His pain is now in the left leg. I did ask him to contact Dr. Eboni Cancino regarding this. 2.  Schedule for his back surgery February 22.    3.  We will repeat his CBC the end of the week to make sure his white count is not elevated. Evaded white count may be due to stress and pain. He did get steroid injection January 9.    4. In terms of pain control we have elected to increase his tramadol from 50 mg up to 100 mg twice a day. I also said he could take Tylenol 500 mg tablets and take 2 of those twice a day. 5.  [employer] message on how he does over the next 24 hours and if he is able to contact Dr. Eboni Cancino for any other pain control strategies preop. 6.  He did indicate that he had a \"reaction\" to the PCV 20 vaccine with body aches. However his labs were done just a short time after the PCV vaccination.

## 2023-02-06 NOTE — TELEPHONE ENCOUNTER
Spoke with patient states he is scheduled for surgery 02/22 with Dr. Jake Mares for sciatic pain. Patient states pain has been increasing to left leg as well as right. Patient currently taking tramadol and gabapentin as prescribed and supplementing with ibuprofen. Patient states pain has gotten worse since Friday. Patient states medications are not helping with pain. Patient wants to know if there is anything else he can do, also is it okay that he has added ibuprofen?  To Dr. Juany Glass please advise

## 2023-02-07 ENCOUNTER — PATIENT MESSAGE (OUTPATIENT)
Dept: INTERNAL MEDICINE CLINIC | Facility: CLINIC | Age: 68
End: 2023-02-07

## 2023-02-07 ENCOUNTER — HOSPITAL ENCOUNTER (OUTPATIENT)
Dept: GENERAL RADIOLOGY | Facility: HOSPITAL | Age: 68
Discharge: HOME | End: 2023-02-07
Attending: INTERNAL MEDICINE
Payer: MEDICARE

## 2023-02-07 DIAGNOSIS — E78.5 HYPERLIPIDEMIA, UNSPECIFIED HYPERLIPIDEMIA TYPE: ICD-10-CM

## 2023-02-07 DIAGNOSIS — Z01.818 PRE-OP EVALUATION: ICD-10-CM

## 2023-02-07 PROCEDURE — 71046 X-RAY EXAM CHEST 2 VIEWS: CPT | Performed by: INTERNAL MEDICINE

## 2023-02-08 NOTE — TELEPHONE ENCOUNTER
From: Clementina Hollingsworth  To: Asa Cuellar MD  Sent: 2/7/2023 4:44 PM CST  Subject: Follow up    I left a message for Dr. Bee Deleon at his office on Monday, I haven't heard back. My next thought is to open a My Chart [address].  Thanks again

## 2023-02-10 ENCOUNTER — LAB ENCOUNTER (OUTPATIENT)
Dept: LAB | Facility: HOSPITAL | Age: 68
End: 2023-02-10
Attending: INTERNAL MEDICINE
Payer: MEDICARE

## 2023-02-10 DIAGNOSIS — R79.89 ABNORMAL CBC: ICD-10-CM

## 2023-02-10 LAB
BASOPHILS # BLD AUTO: 0.12 X10(3) UL (ref 0–0.2)
BASOPHILS NFR BLD AUTO: 1.4 %
DEPRECATED RDW RBC AUTO: 40.3 FL (ref 35.1–46.3)
EOSINOPHIL # BLD AUTO: 0.28 X10(3) UL (ref 0–0.7)
EOSINOPHIL NFR BLD AUTO: 3.2 %
ERYTHROCYTE [DISTWIDTH] IN BLOOD BY AUTOMATED COUNT: 12.9 % (ref 11–15)
HCT VFR BLD AUTO: 39.8 %
HGB BLD-MCNC: 13.5 G/DL
IMM GRANULOCYTES # BLD AUTO: 0.03 X10(3) UL (ref 0–1)
IMM GRANULOCYTES NFR BLD: 0.3 %
LYMPHOCYTES # BLD AUTO: 2.8 X10(3) UL (ref 1–4)
LYMPHOCYTES NFR BLD AUTO: 32.4 %
MCH RBC QN AUTO: 28.8 PG (ref 26–34)
MCHC RBC AUTO-ENTMCNC: 33.9 G/DL (ref 31–37)
MCV RBC AUTO: 85 FL
MONOCYTES # BLD AUTO: 1.07 X10(3) UL (ref 0.1–1)
MONOCYTES NFR BLD AUTO: 12.4 %
NEUTROPHILS # BLD AUTO: 4.34 X10 (3) UL (ref 1.5–7.7)
NEUTROPHILS # BLD AUTO: 4.34 X10(3) UL (ref 1.5–7.7)
NEUTROPHILS NFR BLD AUTO: 50.3 %
PLATELET # BLD AUTO: 363 10(3)UL (ref 150–450)
RBC # BLD AUTO: 4.68 X10(6)UL
WBC # BLD AUTO: 8.6 X10(3) UL (ref 4–11)

## 2023-02-10 PROCEDURE — 36415 COLL VENOUS BLD VENIPUNCTURE: CPT

## 2023-02-10 PROCEDURE — 85025 COMPLETE CBC W/AUTO DIFF WBC: CPT

## 2023-02-13 RX ORDER — TRAMADOL HYDROCHLORIDE 50 MG/1
TABLET ORAL
Qty: 28 TABLET | Refills: 0 | Status: SHIPPED | OUTPATIENT
Start: 2023-02-13

## 2023-02-13 NOTE — TELEPHONE ENCOUNTER
Dr. Marco Chino --- Did they respond to our faxed request? Have you received anything in the last couple weeks on this?

## 2023-02-19 ENCOUNTER — LAB ENCOUNTER (OUTPATIENT)
Dept: LAB | Facility: HOSPITAL | Age: 68
End: 2023-02-19
Attending: NEUROLOGICAL SURGERY
Payer: MEDICARE

## 2023-02-19 DIAGNOSIS — Z01.818 PREOP TESTING: ICD-10-CM

## 2023-02-19 LAB — SARS-COV-2 RNA RESP QL NAA+PROBE: NOT DETECTED

## 2023-02-21 ENCOUNTER — ANESTHESIA EVENT (OUTPATIENT)
Dept: SURGERY | Facility: HOSPITAL | Age: 68
End: 2023-02-21
Payer: MEDICARE

## 2023-02-22 ENCOUNTER — ANESTHESIA (OUTPATIENT)
Dept: SURGERY | Facility: HOSPITAL | Age: 68
End: 2023-02-22
Payer: MEDICARE

## 2023-02-22 ENCOUNTER — APPOINTMENT (OUTPATIENT)
Dept: GENERAL RADIOLOGY | Facility: HOSPITAL | Age: 68
End: 2023-02-22
Attending: NEUROLOGICAL SURGERY
Payer: MEDICARE

## 2023-02-22 ENCOUNTER — HOSPITAL ENCOUNTER (INPATIENT)
Facility: HOSPITAL | Age: 68
LOS: 1 days | Discharge: HOME | End: 2023-02-23
Attending: NEUROLOGICAL SURGERY | Admitting: NEUROLOGICAL SURGERY
Payer: MEDICARE

## 2023-02-22 DIAGNOSIS — Z01.818 PREOP TESTING: Primary | ICD-10-CM

## 2023-02-22 PROBLEM — M48.062 SPINAL STENOSIS OF LUMBAR REGION WITH NEUROGENIC CLAUDICATION: Status: ACTIVE | Noted: 2023-02-22

## 2023-02-22 PROCEDURE — 01NB0ZZ RELEASE LUMBAR NERVE, OPEN APPROACH: ICD-10-PCS | Performed by: NEUROLOGICAL SURGERY

## 2023-02-22 PROCEDURE — 4A11X4G MONITORING OF PERIPHERAL NERVOUS ELECTRICAL ACTIVITY, INTRAOPERATIVE, EXTERNAL APPROACH: ICD-10-PCS | Performed by: NEUROLOGICAL SURGERY

## 2023-02-22 PROCEDURE — 0SG0071 FUSION OF LUMBAR VERTEBRAL JOINT WITH AUTOLOGOUS TISSUE SUBSTITUTE, POSTERIOR APPROACH, POSTERIOR COLUMN, OPEN APPROACH: ICD-10-PCS | Performed by: NEUROLOGICAL SURGERY

## 2023-02-22 PROCEDURE — 0ST20ZZ RESECTION OF LUMBAR VERTEBRAL DISC, OPEN APPROACH: ICD-10-PCS | Performed by: NEUROLOGICAL SURGERY

## 2023-02-22 PROCEDURE — 0SG00AJ FUSION OF LUMBAR VERTEBRAL JOINT WITH INTERBODY FUSION DEVICE, POSTERIOR APPROACH, ANTERIOR COLUMN, OPEN APPROACH: ICD-10-PCS | Performed by: NEUROLOGICAL SURGERY

## 2023-02-22 PROCEDURE — 99232 SBSQ HOSP IP/OBS MODERATE 35: CPT | Performed by: HOSPITALIST

## 2023-02-22 PROCEDURE — 76000 FLUOROSCOPY <1 HR PHYS/QHP: CPT | Performed by: NEUROLOGICAL SURGERY

## 2023-02-22 DEVICE — TLX20, 8X11X31MM 20°
Type: IMPLANTABLE DEVICE | Site: BACK | Status: FUNCTIONAL
Brand: TLX

## 2023-02-22 DEVICE — RELINE LCK SCRW 5.5 OPEN TULIP: Type: IMPLANTABLE DEVICE | Site: BACK | Status: FUNCTIONAL

## 2023-02-22 DEVICE — BONE GRAFT KIT 7510100 INFUSE X SMALL
Type: IMPLANTABLE DEVICE | Site: BACK | Status: FUNCTIONAL
Brand: INFUSE® BONE GRAFT

## 2023-02-22 DEVICE — RELINE MAS RED SCRW 6.5X50 2C: Type: IMPLANTABLE DEVICE | Site: BACK | Status: FUNCTIONAL

## 2023-02-22 DEVICE — RELINE MAS TI ROD 5.5X35 LRDTC: Type: IMPLANTABLE DEVICE | Site: BACK | Status: FUNCTIONAL

## 2023-02-22 DEVICE — OSTEOCEL PRO LARGE BULK BUY: Type: IMPLANTABLE DEVICE | Site: BACK | Status: FUNCTIONAL

## 2023-02-22 RX ORDER — HYDROCODONE BITARTRATE AND ACETAMINOPHEN 10; 325 MG/1; MG/1
1 TABLET ORAL EVERY 4 HOURS PRN
Status: DISCONTINUED | OUTPATIENT
Start: 2023-02-22 | End: 2023-02-23

## 2023-02-22 RX ORDER — HYDROMORPHONE HYDROCHLORIDE 1 MG/ML
0.4 INJECTION, SOLUTION INTRAMUSCULAR; INTRAVENOUS; SUBCUTANEOUS EVERY 2 HOUR PRN
Status: DISCONTINUED | OUTPATIENT
Start: 2023-02-22 | End: 2023-02-23

## 2023-02-22 RX ORDER — HYDROMORPHONE HYDROCHLORIDE 1 MG/ML
0.2 INJECTION, SOLUTION INTRAMUSCULAR; INTRAVENOUS; SUBCUTANEOUS EVERY 5 MIN PRN
Status: DISCONTINUED | OUTPATIENT
Start: 2023-02-22 | End: 2023-02-22 | Stop reason: HOSPADM

## 2023-02-22 RX ORDER — DOCUSATE SODIUM 100 MG/1
100 CAPSULE, LIQUID FILLED ORAL 2 TIMES DAILY
Status: DISCONTINUED | OUTPATIENT
Start: 2023-02-22 | End: 2023-02-23

## 2023-02-22 RX ORDER — CEFAZOLIN SODIUM/WATER 2 G/20 ML
2 SYRINGE (ML) INTRAVENOUS ONCE
Status: COMPLETED | OUTPATIENT
Start: 2023-02-22 | End: 2023-02-22

## 2023-02-22 RX ORDER — DEXAMETHASONE SODIUM PHOSPHATE 4 MG/ML
VIAL (ML) INJECTION AS NEEDED
Status: DISCONTINUED | OUTPATIENT
Start: 2023-02-22 | End: 2023-02-22 | Stop reason: SURG

## 2023-02-22 RX ORDER — BISACODYL 10 MG
10 SUPPOSITORY, RECTAL RECTAL
Status: DISCONTINUED | OUTPATIENT
Start: 2023-02-22 | End: 2023-02-23

## 2023-02-22 RX ORDER — HYDROCODONE BITARTRATE AND ACETAMINOPHEN 5; 325 MG/1; MG/1
1 TABLET ORAL EVERY 4 HOURS PRN
Status: DISCONTINUED | OUTPATIENT
Start: 2023-02-22 | End: 2023-02-23

## 2023-02-22 RX ORDER — ACETAMINOPHEN 500 MG
1000 TABLET ORAL ONCE
Status: COMPLETED | OUTPATIENT
Start: 2023-02-22 | End: 2023-02-22

## 2023-02-22 RX ORDER — HYDROMORPHONE HYDROCHLORIDE 1 MG/ML
0.2 INJECTION, SOLUTION INTRAMUSCULAR; INTRAVENOUS; SUBCUTANEOUS EVERY 2 HOUR PRN
Status: DISCONTINUED | OUTPATIENT
Start: 2023-02-22 | End: 2023-02-23

## 2023-02-22 RX ORDER — LIDOCAINE HYDROCHLORIDE 10 MG/ML
INJECTION, SOLUTION EPIDURAL; INFILTRATION; INTRACAUDAL; PERINEURAL AS NEEDED
Status: DISCONTINUED | OUTPATIENT
Start: 2023-02-22 | End: 2023-02-22 | Stop reason: SURG

## 2023-02-22 RX ORDER — NALOXONE HYDROCHLORIDE 0.4 MG/ML
80 INJECTION, SOLUTION INTRAMUSCULAR; INTRAVENOUS; SUBCUTANEOUS AS NEEDED
Status: DISCONTINUED | OUTPATIENT
Start: 2023-02-22 | End: 2023-02-22 | Stop reason: HOSPADM

## 2023-02-22 RX ORDER — MORPHINE SULFATE 4 MG/ML
4 INJECTION, SOLUTION INTRAMUSCULAR; INTRAVENOUS EVERY 10 MIN PRN
Status: DISCONTINUED | OUTPATIENT
Start: 2023-02-22 | End: 2023-02-22 | Stop reason: HOSPADM

## 2023-02-22 RX ORDER — ONDANSETRON 2 MG/ML
INJECTION INTRAMUSCULAR; INTRAVENOUS AS NEEDED
Status: DISCONTINUED | OUTPATIENT
Start: 2023-02-22 | End: 2023-02-22 | Stop reason: SURG

## 2023-02-22 RX ORDER — METHOCARBAMOL 500 MG/1
500 TABLET, FILM COATED ORAL 3 TIMES DAILY PRN
Status: DISCONTINUED | OUTPATIENT
Start: 2023-02-22 | End: 2023-02-23

## 2023-02-22 RX ORDER — SENNOSIDES 8.6 MG
17.2 TABLET ORAL NIGHTLY
Status: DISCONTINUED | OUTPATIENT
Start: 2023-02-22 | End: 2023-02-23

## 2023-02-22 RX ORDER — SODIUM PHOSPHATE, DIBASIC AND SODIUM PHOSPHATE, MONOBASIC 7; 19 G/133ML; G/133ML
1 ENEMA RECTAL ONCE AS NEEDED
Status: DISCONTINUED | OUTPATIENT
Start: 2023-02-22 | End: 2023-02-23

## 2023-02-22 RX ORDER — ONDANSETRON 2 MG/ML
4 INJECTION INTRAMUSCULAR; INTRAVENOUS EVERY 6 HOURS PRN
Status: DISCONTINUED | OUTPATIENT
Start: 2023-02-22 | End: 2023-02-23

## 2023-02-22 RX ORDER — SODIUM CHLORIDE, SODIUM LACTATE, POTASSIUM CHLORIDE, CALCIUM CHLORIDE 600; 310; 30; 20 MG/100ML; MG/100ML; MG/100ML; MG/100ML
INJECTION, SOLUTION INTRAVENOUS CONTINUOUS
Status: DISCONTINUED | OUTPATIENT
Start: 2023-02-22 | End: 2023-02-22 | Stop reason: HOSPADM

## 2023-02-22 RX ORDER — BUPIVACAINE HYDROCHLORIDE AND EPINEPHRINE 2.5; 5 MG/ML; UG/ML
INJECTION, SOLUTION INFILTRATION; PERINEURAL AS NEEDED
Status: DISCONTINUED | OUTPATIENT
Start: 2023-02-22 | End: 2023-02-22 | Stop reason: HOSPADM

## 2023-02-22 RX ORDER — MORPHINE SULFATE 10 MG/ML
6 INJECTION, SOLUTION INTRAMUSCULAR; INTRAVENOUS EVERY 10 MIN PRN
Status: DISCONTINUED | OUTPATIENT
Start: 2023-02-22 | End: 2023-02-22 | Stop reason: HOSPADM

## 2023-02-22 RX ORDER — DIPHENHYDRAMINE HYDROCHLORIDE 50 MG/ML
25 INJECTION INTRAMUSCULAR; INTRAVENOUS EVERY 4 HOURS PRN
Status: DISCONTINUED | OUTPATIENT
Start: 2023-02-22 | End: 2023-02-23

## 2023-02-22 RX ORDER — GABAPENTIN 300 MG/1
300 CAPSULE ORAL 3 TIMES DAILY
Status: DISCONTINUED | OUTPATIENT
Start: 2023-02-22 | End: 2023-02-23

## 2023-02-22 RX ORDER — ATORVASTATIN CALCIUM 20 MG/1
20 TABLET, FILM COATED ORAL NIGHTLY
Status: DISCONTINUED | OUTPATIENT
Start: 2023-02-22 | End: 2023-02-23

## 2023-02-22 RX ORDER — EPHEDRINE SULFATE 50 MG/ML
INJECTION, SOLUTION INTRAVENOUS AS NEEDED
Status: DISCONTINUED | OUTPATIENT
Start: 2023-02-22 | End: 2023-02-22 | Stop reason: SURG

## 2023-02-22 RX ORDER — GLYCOPYRROLATE 0.2 MG/ML
INJECTION, SOLUTION INTRAMUSCULAR; INTRAVENOUS AS NEEDED
Status: DISCONTINUED | OUTPATIENT
Start: 2023-02-22 | End: 2023-02-22 | Stop reason: SURG

## 2023-02-22 RX ORDER — MORPHINE SULFATE 4 MG/ML
2 INJECTION, SOLUTION INTRAMUSCULAR; INTRAVENOUS EVERY 10 MIN PRN
Status: DISCONTINUED | OUTPATIENT
Start: 2023-02-22 | End: 2023-02-22 | Stop reason: HOSPADM

## 2023-02-22 RX ORDER — CLONAZEPAM 0.5 MG/1
0.5 TABLET ORAL DAILY PRN
Status: DISCONTINUED | OUTPATIENT
Start: 2023-02-22 | End: 2023-02-23

## 2023-02-22 RX ORDER — HYDROMORPHONE HYDROCHLORIDE 1 MG/ML
0.6 INJECTION, SOLUTION INTRAMUSCULAR; INTRAVENOUS; SUBCUTANEOUS EVERY 5 MIN PRN
Status: DISCONTINUED | OUTPATIENT
Start: 2023-02-22 | End: 2023-02-22 | Stop reason: HOSPADM

## 2023-02-22 RX ORDER — METOCLOPRAMIDE HYDROCHLORIDE 5 MG/ML
10 INJECTION INTRAMUSCULAR; INTRAVENOUS EVERY 8 HOURS PRN
Status: DISCONTINUED | OUTPATIENT
Start: 2023-02-22 | End: 2023-02-23

## 2023-02-22 RX ORDER — CEFAZOLIN SODIUM/WATER 2 G/20 ML
2 SYRINGE (ML) INTRAVENOUS EVERY 8 HOURS
Status: COMPLETED | OUTPATIENT
Start: 2023-02-22 | End: 2023-02-23

## 2023-02-22 RX ORDER — SODIUM CHLORIDE, SODIUM LACTATE, POTASSIUM CHLORIDE, CALCIUM CHLORIDE 600; 310; 30; 20 MG/100ML; MG/100ML; MG/100ML; MG/100ML
INJECTION, SOLUTION INTRAVENOUS CONTINUOUS
Status: DISCONTINUED | OUTPATIENT
Start: 2023-02-22 | End: 2023-02-22

## 2023-02-22 RX ORDER — HYDROMORPHONE HYDROCHLORIDE 1 MG/ML
0.4 INJECTION, SOLUTION INTRAMUSCULAR; INTRAVENOUS; SUBCUTANEOUS EVERY 5 MIN PRN
Status: DISCONTINUED | OUTPATIENT
Start: 2023-02-22 | End: 2023-02-22 | Stop reason: HOSPADM

## 2023-02-22 RX ORDER — DIPHENHYDRAMINE HCL 25 MG
25 CAPSULE ORAL EVERY 4 HOURS PRN
Status: DISCONTINUED | OUTPATIENT
Start: 2023-02-22 | End: 2023-02-23

## 2023-02-22 RX ORDER — POLYETHYLENE GLYCOL 3350 17 G/17G
17 POWDER, FOR SOLUTION ORAL DAILY PRN
Status: DISCONTINUED | OUTPATIENT
Start: 2023-02-22 | End: 2023-02-23

## 2023-02-22 RX ORDER — SODIUM CHLORIDE 9 MG/ML
INJECTION, SOLUTION INTRAVENOUS CONTINUOUS PRN
Status: DISCONTINUED | OUTPATIENT
Start: 2023-02-22 | End: 2023-02-22 | Stop reason: SURG

## 2023-02-22 RX ADMIN — SODIUM CHLORIDE, SODIUM LACTATE, POTASSIUM CHLORIDE, CALCIUM CHLORIDE: 600; 310; 30; 20 INJECTION, SOLUTION INTRAVENOUS at 07:31:00

## 2023-02-22 RX ADMIN — GLYCOPYRROLATE 0.2 MG: 0.2 INJECTION, SOLUTION INTRAMUSCULAR; INTRAVENOUS at 07:57:00

## 2023-02-22 RX ADMIN — EPHEDRINE SULFATE 10 MG: 50 INJECTION, SOLUTION INTRAVENOUS at 08:49:00

## 2023-02-22 RX ADMIN — EPHEDRINE SULFATE 5 MG: 50 INJECTION, SOLUTION INTRAVENOUS at 07:59:00

## 2023-02-22 RX ADMIN — EPHEDRINE SULFATE 5 MG: 50 INJECTION, SOLUTION INTRAVENOUS at 07:48:00

## 2023-02-22 RX ADMIN — EPHEDRINE SULFATE 10 MG: 50 INJECTION, SOLUTION INTRAVENOUS at 08:24:00

## 2023-02-22 RX ADMIN — SODIUM CHLORIDE, SODIUM LACTATE, POTASSIUM CHLORIDE, CALCIUM CHLORIDE: 600; 310; 30; 20 INJECTION, SOLUTION INTRAVENOUS at 09:55:00

## 2023-02-22 RX ADMIN — ONDANSETRON 4 MG: 2 INJECTION INTRAMUSCULAR; INTRAVENOUS at 07:39:00

## 2023-02-22 RX ADMIN — DEXAMETHASONE SODIUM PHOSPHATE 8 MG: 4 MG/ML VIAL (ML) INJECTION at 07:39:00

## 2023-02-22 RX ADMIN — SODIUM CHLORIDE: 9 INJECTION, SOLUTION INTRAVENOUS at 07:42:00

## 2023-02-22 RX ADMIN — LIDOCAINE HYDROCHLORIDE 30 MG: 10 INJECTION, SOLUTION EPIDURAL; INFILTRATION; INTRACAUDAL; PERINEURAL at 07:38:00

## 2023-02-22 RX ADMIN — CEFAZOLIN SODIUM/WATER 2 G: 2 G/20 ML SYRINGE (ML) INTRAVENOUS at 07:44:00

## 2023-02-22 RX ADMIN — GLYCOPYRROLATE 0.2 MG: 0.2 INJECTION, SOLUTION INTRAMUSCULAR; INTRAVENOUS at 07:31:00

## 2023-02-22 NOTE — OPERATIVE REPORT
OPERATIVE REPORT    PATIENT NAME: Jc Cameron    DATE OF OPERATION:  2/22/2023    PREOPERATIVE DIAGNOSIS:  1. Lumbar stenosis with neurogenic claudication                 POSTOPERATIVE DIAGNOSIS: 1. same                   OPERATIVE PROCEDURE:  1.  Lumbar 4 through 5 transforaminal intervertebral arthrodesis and fusion, utilizing Titanium interbodies/cages and allograft and locally-harvested morselized autograft  2. Lumbar 4 through 5 hemilaminectomy and right complete facetectomy and right posterolateral arthrodesis, utilizing locally-harvested morselized autograft  3. Lumbar 4 through 5 pedicle screw and joshua instrumentation for augmentation of fusion purposes. 4. L2-3 laminectomy and right medial facetectomy and foraminotomy utilizing microscope and m iscrosurgical technique via separate incision. 4.  Real time intraoperative fluoroscopy and C-arm with the image guidance. 5.  Real time intraoperative motor-evoked potentials, SSEP and nerve monitoring. SURGEON:  Kevon Fu M.D.    ASSISTANT: Ang Parker PA-C    ANESTHESIA: General endotracheal anesthesia with TIVA and local anesthetic. ESTIMATED BLOOD LOSS: 30cc    INTRAVENOUS FLUIDS AND URINE OUTPUT: Per anesthesia sheet    TRANSFUSION: None    IMPLANTS: Nuvasive    DRAIN: none    SPECIMEN: None    COMPLICATIONS: none    PROCEDURE:    After informed consent was obtained, the patient was brought to the operating room. After the uneventful induction of general endotracheal anesthesia and placement of the Parker catheter, electrodes and monitoring devices were placed. The patient was then positioned on the operating room table, an open-frame Reji Table, in the prone position. The arms were supported and the neck physiologically extended. All pressure points were adequately padded. The patient's eyes were protected from exposure to prolonged pressure. Baseline electrophysiological potentials were obtained.  Subsequently, we utilized lateral and AP fluoroscopic guidance to identify our incision sites relative to the lumbar bony anatomy corresponding to the correct level(s). We identified the incision sites for our tubular retractor placement as well as the percutaneous screws, contralaterally. They were marked out on the skin. The patient was prepped and draped sterilely. The C-arm was also draped sterilely into the field. Time-Out was done in the proper fashion. Perioperative antibiosis was given within one hour of incision. After the \"Time-Out\", we infiltrated the incisions with our usual local anesthetic. We incised utilizing a 10-blade scalpel. The subcutaneous tissues were opened with Bovie cautery down to the fascia. The fascia was opened sharply using both sharp and blunt dissection. We then used sequential dilators to place a tubular retractor at the L4-5 level along the right laminofacet junction. This was done with fluoroscopic guidance. The tubular retractor was then connected to a table-mounted arm for added stability. The soft tissues overlying the laminofacet junction were resected utilizing mono- and bipolar electrocautery. We then used a high-speed sharita the create an ipsilateral laminectomy, undercutting the lamina contralaterally in order to accomplish a generous central decompression. The medial ispilateral facet was then drilled in order to decompress the lateral recess. A high-speed sharita was then utilized to complete the generous laminectomy and the right pars was then transected perpendicularly. This detached the L4 articulating facet en-bloc. We then drilled down the corresponding L5 articulating surface to expose the L4-5 intervertebral space and disc. The rest of the bony decompression was accomplished utilizing Kerrison rongeurs. After the soft tissues, including the ligamentum flavum, had been resected, we visualized the ipsilaterally exiting L4 and the en-passage L5 nerve roots verifying adequate decompression.  The disc space was then re-identified and some epidural veins around the disc space were coagulated and cut sharply with the microscissors. The disc space was incised and a radical discectomy was performed using a series of Saji and [employer], extending across the midline with angled curettes. The cartilaginous endplates were then removed using a series of curettes, rongeurs, rasps, and jerri, removing the cartilaginous endplates and roughing up the bony endplates to get good bleeding bone along both surfaces for fusion purposes. This was inspected with direct visualization and fluoroscopy repeatedly. The wound was copiously irrigated. The nerves were maintained safe and protected with continued direct visualization. No additional disc or cartilaginous endplate was encountered and very good bleeding bony endplates were seen. We trialed for the properly-sized intervertebral cage and then filled it with allograft. About 9 cc of Osteocel Plus with some of the drilled autologous bone were impacted along the ventral aspect of the disc space and also to the right side of the disc space utilizing a funnel applicator and graft delivery system. While gently and carefully retracting the en-passage nerve root medially, the cage was impacted into position and 75 % expanded to proper alignment. Approximately, 15 degrees of segmental lordosis were gained. Fluoroscopy confirmed good positioning of the cage. We irrigated the surgical site copiously and hemostasis was obtained with bipolar electrocautery and Flowseal. We then decorticated the ipsilateral posterolateral elements generously extending from L4 to L5 with the high-speed sharita. The rest of our harvested autograft which had been denuded off all soft tissue and morselized was then packed into the posterolateral space and gutter extending from L4 through L5 for posterolateral arthrodesis purposes.  The tubular retractor was then slowly removed, verifying hemostasis at every point of the way meticulously with bipolar electrocautery. Next, after the corresponding incisions were opened with a 10-blade, a monitored Jamshidi needle was used to cannulate the pedicles from L4 to L5 in the traditional transpedicular fashion. We verified electrophysiological readings at good thresholds. The trajectories were fluoroscopically confirmed. Subsequently, the screws with their extenders were placed over guidewires utilizing fluoroscopic guidance. Finally, we tested and stimulated all screws electrophysiologically at appropriate thresholds. At this point in the procedure we proceeded to finish the instrumentation. In cases where a segmental spondylolisthesis was present, we asked our anesthesia colleagues now to provide pharmacological paralysis so that the listhesis would be easier to reduce. Utilizing calipers within the screw extenders, we measured for the properly-sized lordotic rods and placed them appropriately. These were placed through the guide channels of the extenders and easily placed into the screw heads. Locking nuts were provisionally placed. Then, using the torque  and the counter-torque device, each end of the joshua was secured into the tulip heads of the screws, compressing or reducing the construct lightly before tightening the rods. The locking nuts were again revisited with the torque  to confirm tightness. The screw extenders were then removed and final fluoroscopic images documented satisfactory position of the cage, screws, and rods. The wound was copiously irrigated and small bleeding points were controlled with a bipolar electrocautery. The construct was again inspected and found to be quite satisfactory under direct visualization. Still, more irrigation was used and the lumbodorsal fascia was closed using a series of interrupted 0 Vicryl sutures.   The subcutaneous tissues were copiously irrigated and closed with an interrupted inverted 3-0 Vicryl suture. The skin was closed with 4-0 Vicryl and Dermabond. We then infiltrated the incision at L2-3 with our usual local anesthetic. We incised utilizing a 10-blade scalpel. The subcutaneous tissues were opened with a Bovie down to the fascia. The fascia was opened sharply using both sharp and blunt dissection. We then used sequential dilators to place a tubular retractor at the L2-3 level along the right laminofacet junction. This was done with fluoroscopic guidance. The tubular retractor was then connected to a table-mounted arm for added stability. At this point in time, the microscope entered the surgical theater and the rest of the procedure was completed, utilizing microsurgical technique. The soft tissues overlying the laminofacet junction were resected utilizing mono- and bipolar electrocautery. We then used a high-speed sharita the create an ipsilateral laminectomy, undercutting the lamina contralaterally in order to accomplish a generous central decompression. The medial ispilateral facet was then drilled in order to decompress the lateral recess. The residual decompression was accomplished with bayonetted Kerrison rongeurs, removing any residual lamina as well as medial facet. The hypertrophied ligament flavum was divided with a small staight curette and dissected off the [address] [place] [address] Street. A Leksell rongeur was used to remove the ligament and complete the decompression. The en-passage ipsilateral nerve root was identified. We gently retracted the nerve medially with the suction-nerve root retractor and performed an annulotomy with a disc knife. We then completed a thorough discectomy utilizing a down-pushing curette, micro-pituitaries and a ball-tip hook. We continued the discectomy until, overall, the Dura was pulsating in a relaxed fashion, indicating adequate decompression.  At this point in time, we irrigated vigorously and obtained hemostasis meticulously with bipolar electrocautery and FlowSeal. The tubular retractor was then slowly removed, verifying hemostasis at every point of the way meticulously with bipolar electrocautery. The microscope exited the surgical theater at this point. The deep fascia was then closed with interrupted 0 Vicryl suture. The subcutaneous tissues were copiously irrigated and closed with an interrupted inverted 3-0 Vicryl suture. The skin was closed with 4-0 Vicryl and Dermabond. There were no complications. All counts were reported correct. The nerve stimulation of the screws achieved satisfactory thresholds including final placement of the instrumentation. The patient was returned to the supine position, extubated in the operating room, and taken to the recovery room in satisfactory condition, having tolerated the procedure well and moving all fours strongly to command. Please fax a copy of this report to my office at [phone] and the Primary Care Provider of this patient.

## 2023-02-22 NOTE — PLAN OF CARE
Problem: Patient Centered Care  Goal: Patient preferences are identified and integrated in the patient's plan of care  Description: Interventions:  - What would you like us to know as we care for you?   - Provide timely, complete, and accurate information to patient/family  - Incorporate patient and family knowledge, values, beliefs, and cultural backgrounds into the planning and delivery of care  - Encourage patient/family to participate in care and decision-making at the level they choose  - Honor patient and family perspectives and choices  Outcome: Progressing     Problem: Patient/Family Goals  Goal: Patient/Family Long Term Goal  Description: Patient's Long Term Goal:     Interventions:  -   - See additional Care Plan goals for specific interventions  Outcome: Progressing  Goal: Patient/Family Short Term Goal  Description: Patient's Short Term Goal:     Interventions:   -   - See additional Care Plan goals for specific interventions  Outcome: Progressing     Problem: PAIN - ADULT  Goal: Verbalizes/displays adequate comfort level or patient's stated pain goal  Description: INTERVENTIONS:  - Encourage pt to monitor pain and request assistance  - Assess pain using appropriate pain scale  - Administer analgesics based on type and severity of pain and evaluate response  - Implement non-pharmacological measures as appropriate and evaluate response  - Consider cultural and social influences on pain and pain management  - Manage/alleviate anxiety  - Utilize distraction and/or relaxation techniques  - Monitor for opioid side effects  - Notify MD/LIP if interventions unsuccessful or patient reports new pain  - Anticipate increased pain with activity and pre-medicate as appropriate  Outcome: Progressing     Problem: RISK FOR INFECTION - ADULT  Goal: Absence of fever/infection during anticipated neutropenic period  Description: INTERVENTIONS  - Monitor WBC  - Administer growth factors as ordered  - Implement neutropenic guidelines  Outcome: Progressing     Problem: SAFETY ADULT - FALL  Goal: Free from fall injury  Description: INTERVENTIONS:  - Assess pt frequently for physical needs  - Identify cognitive and physical deficits and behaviors that affect risk of falls. - Natalia fall precautions as indicated by assessment.  - Educate pt/family on patient safety including physical limitations  - Instruct pt to call for assistance with activity based on assessment  - Modify environment to reduce risk of injury  - Provide assistive devices as appropriate  - Consider OT/PT consult to assist with strengthening/mobility  - Encourage toileting schedule  Outcome: Vinay Sethi received from [place]. Cms intact denies any numbness or tingling, medicate for pain prn. Breathing on room air, encouraged the use of IS every hour while awake. montano to gravity. Iv fluids infusion. tolerated diet well . Dermabond x2 on mid back cdi.with gel ice. scds in use. safety measures applied and reviewed, Non skid socks in use. bed and chair exit alarm is in use,call light within reach. Bed is in lowest position. @1500 Montano removed as per MD orders.  Able to ambulate in hallway with stand by assist.

## 2023-02-22 NOTE — DISCHARGE INSTRUCTIONS
DISCHARGE INSTRUCTIONS PER DR. Goff [facility]    Wound Care:   No dressing on the incision necessary. Dab dry the incision after shower/cleaning. No tub baths until first post-op follow-up. Leave drain dressing until the next day. May remove then and shower. Activity:   Resume activity as tolerated. Walking as much as possible is highly encouraged. Do not drive while taking narcotic pain medications. Post-Op Medications:   Medications sent to the pharmacy. Take medications as directed. Continue icing of the area of the incision for 30 minutes at a time, multiple   times a day. If a cooling device (e.g. Polar Care) has been provided, utilize as directed per nursing staff. Diet:   Advance diet as tolerated. Follow-up:  Please have patient follow-up in my clinic 2 weeks after surgery. Please have patient call [phone] to verify first post-op appointment.

## 2023-02-22 NOTE — ANESTHESIA PROCEDURE NOTES
Airway  Date/Time: 2/22/2023 7:40 AM  Urgency: Elective      General Information and Staff    Patient location during procedure: OR  Anesthesiologist: Nicole Carrillo MD  Performed: anesthesiologist   Performed by: Nicole Carrillo MD  Authorized by: Nicole Carrillo MD      Indications and Patient Condition  Indications for airway management: anesthesia  Sedation level: deep  Preoxygenated: yes  Patient position: sniffing  Mask difficulty assessment: 3 - difficult mask (inadequate, unstable or two providers) +/- NMBA    Final Airway Details  Final airway type: endotracheal airway      Successful airway: ETT  Cuffed: yes   Successful intubation technique: direct laryngoscopy  Endotracheal tube insertion site: oral  Blade: [place]  Blade size: #3  ETT size (mm): 7.0    Cormack-Lehane Classification: grade I - full view of glottis  Placement verified by: chest auscultation and capnometry   Measured from: lips  ETT to lips (cm): 23  Number of attempts at approach: 1  Number of other approaches attempted: 0    Additional Comments  Unable to mask ventilate with 2 hand, easy intubation

## 2023-02-22 NOTE — ANESTHESIA PROCEDURE NOTES
Peripheral IV  Date/Time: 2/22/2023 7:42 AM  Inserted by: Peyton Carrillo MD    Placement  Needle size: 20 G  Laterality: left  Location: wrist  Site prep: alcohol  Technique: anatomical landmarks  Attempts: 1

## 2023-02-22 NOTE — PLAN OF CARE
Raghav Fu M.D., F.A.A.N.S.  [facility] [address]  Board Certified Neurosurgeon                                     Sharif Dye [address] Neurosurgery        [facility], [address] [place] [facility]    PHONE  [phone]    [email or web address]           2/22/2023  6:48 AM    PRE-OPERATIVE CONSULTATION     Candy Bunch was again informed of the nature of the problem, planned treatment, indications and alternatives. We again reviewed the expected benefits of surgery, as well as all reasonably foreseeable risks, including but not limited to infection, bleeding requiring transfusion or reoperation, no relief or worsening of the pain, numbness, weakness, need for assistive devices to get around, injury to the nerves, paralysis, loss of control of the legs/bladder/bowel/sexual function, spinal fluid leak, scar formation, failure of the fusion to heal (made more likely with smoking,) bad position of the screws or cages, migration of the screws or cages, break of the screws or rods, problems above or below the level of fusion due to increased stress on those levels from the fusion and leading to more pain and possibly the need for more surgery, heart attack, blood clot formation, pulmonary embolism, stroke, coma and death. his questions were all answered and he understands what we discussed. he also understands that no guarantees can be made regarding outcome or results. Candy Bunch agrees the benefits of surgery outweigh the risks, and wishes to proceed with surgery.       Iris Evans M.D., F.A.A.N.S.

## 2023-02-22 NOTE — INTERVAL H&P NOTE
Pre-op Diagnosis: Lumbar stenosis with neurogenic claudication    The above referenced H&P was reviewed by Fracisco Baez MD on 2/22/2023, the patient was examined and no significant changes have occurred in the patient's condition since the H&P was performed. I discussed with the patient and/or legal representative the potential benefits, risks and side effects of this procedure; the likelihood of the patient achieving goals; and potential problems that might occur during recuperation. I discussed reasonable alternatives to the procedure, including risks, benefits and side effects related to the alternatives and risks related to not receiving this procedure. We will proceed with procedure as planned.

## 2023-02-23 VITALS
TEMPERATURE: 99 F | DIASTOLIC BLOOD PRESSURE: 76 MMHG | SYSTOLIC BLOOD PRESSURE: 133 MMHG | HEART RATE: 75 BPM | WEIGHT: 178.69 LBS | BODY MASS INDEX: 28.04 KG/M2 | RESPIRATION RATE: 18 BRPM | OXYGEN SATURATION: 96 % | HEIGHT: 67 IN

## 2023-02-23 PROCEDURE — 99239 HOSP IP/OBS DSCHRG MGMT >30: CPT | Performed by: HOSPITALIST

## 2023-02-23 RX ORDER — MAGNESIUM OXIDE 400 MG (241.3 MG MAGNESIUM) TABLET
1 TABLET NIGHTLY PRN
Status: DISCONTINUED | OUTPATIENT
Start: 2023-02-23 | End: 2023-02-23

## 2023-02-23 RX ORDER — OXYCODONE AND ACETAMINOPHEN 10; 325 MG/1; MG/1
1 TABLET ORAL EVERY 4 HOURS PRN
Status: DISCONTINUED | OUTPATIENT
Start: 2023-02-23 | End: 2023-02-23

## 2023-02-23 RX ORDER — METHOCARBAMOL 500 MG/1
500 TABLET, FILM COATED ORAL 2 TIMES DAILY
Qty: 20 TABLET | Refills: 0 | Status: SHIPPED | OUTPATIENT
Start: 2023-02-23 | End: 2023-03-05

## 2023-02-23 RX ORDER — OXYCODONE HYDROCHLORIDE AND ACETAMINOPHEN 5; 325 MG/1; MG/1
1-2 TABLET ORAL EVERY 6 HOURS PRN
Qty: 56 TABLET | Refills: 0 | Status: SHIPPED | OUTPATIENT
Start: 2023-02-23 | End: 2023-03-02

## 2023-02-23 NOTE — PLAN OF CARE
Patient has been ambulating 1 assist w/walker. Pain is being managed w/ Percocet and Robaxin. States pain is mainly to the right thigh. Is voiding ambulating to the bathroom. Tolerating general diet. Has SCDs while in bed for dvt prophylaxis. Plan is for pt to dc home today w/spouse. Has been cleared to dc. Walker, rx and dc instructions provided. Problem: Patient Centered Care  Goal: Patient preferences are identified and integrated in the patient's plan of care  Description: Interventions:  - What would you like us to know as we care for you?  I am ready to go home   - Provide timely, complete, and accurate information to patient/family  - Incorporate patient and family knowledge, values, beliefs, and cultural backgrounds into the planning and delivery of care  - Encourage patient/family to participate in care and decision-making at the level they choose  - Honor patient and family perspectives and choices  Outcome: Adequate for Discharge     Problem: Patient/Family Goals  Goal: Patient/Family Long Term Goal  Description: Patient's Long Term Goal: To be able to ambulate w/ pain level <5    Interventions:  - Pain medications  - Ambulation  - walker  - gel ice   - See additional Care Plan goals for specific interventions  Outcome: Adequate for Discharge  Goal: Patient/Family Short Term Goal  Description: Patient's Short Term Goal: to go home    Interventions:   - follow plan of care  - See additional Care Plan goals for specific interventions  Outcome: Adequate for Discharge     Problem: PAIN - ADULT  Goal: Verbalizes/displays adequate comfort level or patient's stated pain goal  Description: INTERVENTIONS:  - Encourage pt to monitor pain and request assistance  - Assess pain using appropriate pain scale  - Administer analgesics based on type and severity of pain and evaluate response  - Implement non-pharmacological measures as appropriate and evaluate response  - Consider cultural and social influences on pain and pain management  - Manage/alleviate anxiety  - Utilize distraction and/or relaxation techniques  - Monitor for opioid side effects  - Notify MD/LIP if interventions unsuccessful or patient reports new pain  - Anticipate increased pain with activity and pre-medicate as appropriate  Outcome: Adequate for Discharge     Problem: RISK FOR INFECTION - ADULT  Goal: Absence of fever/infection during anticipated neutropenic period  Description: INTERVENTIONS  - Monitor WBC  - Administer growth factors as ordered  - Implement neutropenic guidelines  Outcome: Adequate for Discharge     Problem: SAFETY ADULT - FALL  Goal: Free from fall injury  Description: INTERVENTIONS:  - Assess pt frequently for physical needs  - Identify cognitive and physical deficits and behaviors that affect risk of falls.   - [place] fall precautions as indicated by assessment.  - Educate pt/family on patient safety including physical limitations  - Instruct pt to call for assistance with activity based on assessment  - Modify environment to reduce risk of injury  - Provide assistive devices as appropriate  - Consider OT/PT consult to assist with strengthening/mobility  - Encourage toileting schedule  Outcome: Adequate for Discharge     Problem: SKIN/TISSUE INTEGRITY - ADULT  Goal: Skin integrity remains intact  Description: INTERVENTIONS  - Assess and document risk factors for pressure ulcer development  - Assess and document skin integrity  - Monitor for areas of redness and/or skin breakdown  - Initiate interventions, skin care algorithm/standards of care as needed  Outcome: Adequate for Discharge  Goal: Incision(s), wounds(s) or drain site(s) healing without S/S of infection  Description: INTERVENTIONS:  - Assess and document risk factors for pressure ulcer development  - Assess and document skin integrity  - Assess and document dressing/incision, wound bed, drain sites and surrounding tissue  - Implement wound care per orders  - Initiate isolation precautions as appropriate  - Initiate Pressure Ulcer prevention bundle as indicated  Outcome: Adequate for Discharge     Problem: MUSCULOSKELETAL - ADULT  Goal: Return mobility to safest level of function  Description: INTERVENTIONS:  - Assess patient stability and activity tolerance for standing, transferring and ambulating w/ or w/o assistive devices  - Assist with transfers and ambulation using safe patient handling equipment as needed  - Ensure adequate protection for wounds/incisions during mobilization  - Obtain PT/OT consults as needed  - Advance activity as appropriate  - Communicate ordered activity level and limitations with patient/family  Outcome: Adequate for Discharge  Goal: Maintain proper alignment of affected body part  Description: INTERVENTIONS:  - Support and protect limb and body alignment per provider's orders  - Instruct and reinforce with patient and family use of appropriate assistive device and precautions (e.g. spinal or hip dislocation precautions)  Outcome: Adequate for Discharge

## 2023-02-23 NOTE — PLAN OF CARE
POD #1. Pt alert & oriented x4. No acute changes noted at this time. Pain managed with robaxin, norco, and ice packs. Denies nausea/vomiting. SCDs for DVT prophylaxis. Voiding freely. Standby assist. Surgical site dry/intact/open to air. Fall precautions maintained. Call light within reach. Bed locked, in lowest position, I-bed active. Problem: Patient Centered Care  Goal: Patient preferences are identified and integrated in the patient's plan of care  Description: Interventions:  - What would you like us to know as we care for you?  This is his first major surgery  - Provide timely, complete, and accurate information to patient/family  - Incorporate patient and family knowledge, values, beliefs, and cultural backgrounds into the planning and delivery of care  - Encourage patient/family to participate in care and decision-making at the level they choose  - Honor patient and family perspectives and choices  Outcome: Progressing     Problem: PAIN - ADULT  Goal: Verbalizes/displays adequate comfort level or patient's stated pain goal  Description: INTERVENTIONS:  - Encourage pt to monitor pain and request assistance  - Assess pain using appropriate pain scale  - Administer analgesics based on type and severity of pain and evaluate response  - Implement non-pharmacological measures as appropriate and evaluate response  - Consider cultural and social influences on pain and pain management  - Manage/alleviate anxiety  - Utilize distraction and/or relaxation techniques  - Monitor for opioid side effects  - Notify MD/LIP if interventions unsuccessful or patient reports new pain  - Anticipate increased pain with activity and pre-medicate as appropriate  Outcome: Progressing     Problem: RISK FOR INFECTION - ADULT  Goal: Absence of fever/infection during anticipated neutropenic period  Description: INTERVENTIONS  - Monitor WBC  - Administer growth factors as ordered  - Implement neutropenic guidelines  Outcome: Progressing Problem: SAFETY ADULT - FALL  Goal: Free from fall injury  Description: INTERVENTIONS:  - Assess pt frequently for physical needs  - Identify cognitive and physical deficits and behaviors that affect risk of falls.   - [place] fall precautions as indicated by assessment.  - Educate pt/family on patient safety including physical limitations  - Instruct pt to call for assistance with activity based on assessment  - Modify environment to reduce risk of injury  - Provide assistive devices as appropriate  - Consider OT/PT consult to assist with strengthening/mobility  - Encourage toileting schedule  Outcome: Progressing     Problem: SKIN/TISSUE INTEGRITY - ADULT  Goal: Skin integrity remains intact  Description: INTERVENTIONS  - Assess and document risk factors for pressure ulcer development  - Assess and document skin integrity  - Monitor for areas of redness and/or skin breakdown  - Initiate interventions, skin care algorithm/standards of care as needed  Outcome: Progressing  Goal: Incision(s), wounds(s) or drain site(s) healing without S/S of infection  Description: INTERVENTIONS:  - Assess and document risk factors for pressure ulcer development  - Assess and document skin integrity  - Assess and document dressing/incision, wound bed, drain sites and surrounding tissue  - Implement wound care per orders  - Initiate isolation precautions as appropriate  - Initiate Pressure Ulcer prevention bundle as indicated  Outcome: Progressing     Problem: MUSCULOSKELETAL - ADULT  Goal: Return mobility to safest level of function  Description: INTERVENTIONS:  - Assess patient stability and activity tolerance for standing, transferring and ambulating w/ or w/o assistive devices  - Assist with transfers and ambulation using safe patient handling equipment as needed  - Ensure adequate protection for wounds/incisions during mobilization  - Obtain PT/OT consults as needed  - Advance activity as appropriate  - Communicate ordered activity level and limitations with patient/family  Outcome: Progressing  Goal: Maintain proper alignment of affected body part  Description: INTERVENTIONS:  - Support and protect limb and body alignment per provider's orders  - Instruct and reinforce with patient and family use of appropriate assistive device and precautions (e.g. spinal or hip dislocation precautions)  Outcome: Progressing

## 2023-02-24 ENCOUNTER — PATIENT OUTREACH (OUTPATIENT)
Dept: CASE MANAGEMENT | Age: 68
End: 2023-02-24

## 2023-03-27 ENCOUNTER — TELEPHONE (OUTPATIENT)
Dept: INTERNAL MEDICINE CLINIC | Facility: CLINIC | Age: 68
End: 2023-03-27

## 2023-03-27 NOTE — TELEPHONE ENCOUNTER
Please let patient know that I received information from [facility] regarding his surgery April 14. They want me to clear him preoperatively. They want me to send a urine and urine culture. With that said I should probably see him within the next 7 to 10 days or so to document his preop office visit and then get his urine and urine culture.

## 2023-03-27 NOTE — TELEPHONE ENCOUNTER
Spoke to patient to schedule preop clearance appointment with Dr. Benedicto Lane for procedure with Dr. Kathy Fleming at [facility].  Procedure is scheduled for 4/14/2023 Cystoscopy TURBT intravesical gemcitabine. Patient had a procedure 2/22/2023 that Dr. Benedicto Lane cleared him for. Patient is asking that we check with Dr. Benedicto Lane that he would need to see him prior to this next procedure. Preop paperwork has been placed in Dr. Lynn Wong.     Best call back number [phone]

## 2023-03-27 NOTE — TELEPHONE ENCOUNTER
Spoke to patient and relayed MD message. States unsure of what is needed for surgery whether he needs to be re-evaluated for pre op clearance. Attempted to contact Bishop Regalado, Dr Ana Tovar coordinator, office is closed, no voicemail capability.   Asked to call office at [address] [place] [address] tomorrow  [phone]

## 2023-03-27 NOTE — TELEPHONE ENCOUNTER
We can tell patient that from my perspective since we did clear him for major back surgery so we may not need to see him but I would not want to override the protocol from [facility]. Please ask him to call 's office and let them know that I saw him in preparation for back surgery February 3. He can ask them if I need to see him again. If he does not have any new symptoms regarding cardiac or lung issues he may not need to be seen.     But he should call [facility] and ask if they would need me to see him before his cystoscopy and bladder procedure

## 2023-03-28 NOTE — TELEPHONE ENCOUNTER
Patient was called per request below. Cheryl was added to Dr Mecca Patel schedule on 4/3/2023 at 11am for a one hours time slot.

## 2023-04-03 ENCOUNTER — LAB ENCOUNTER (OUTPATIENT)
Dept: LAB | Age: 68
End: 2023-04-03
Attending: INTERNAL MEDICINE
Payer: MEDICARE

## 2023-04-03 ENCOUNTER — OFFICE VISIT (OUTPATIENT)
Dept: INTERNAL MEDICINE CLINIC | Facility: CLINIC | Age: 68
End: 2023-04-03

## 2023-04-03 VITALS
WEIGHT: 175.5 LBS | HEIGHT: 67 IN | HEART RATE: 70 BPM | BODY MASS INDEX: 27.55 KG/M2 | DIASTOLIC BLOOD PRESSURE: 62 MMHG | OXYGEN SATURATION: 99 % | SYSTOLIC BLOOD PRESSURE: 118 MMHG

## 2023-04-03 DIAGNOSIS — Z00.00 ROUTINE HEALTH MAINTENANCE: ICD-10-CM

## 2023-04-03 DIAGNOSIS — R82.90 ABNORMAL URINALYSIS: ICD-10-CM

## 2023-04-03 DIAGNOSIS — E78.5 HYPERLIPIDEMIA, UNSPECIFIED HYPERLIPIDEMIA TYPE: ICD-10-CM

## 2023-04-03 DIAGNOSIS — Z01.818 PRE-OP EVALUATION: ICD-10-CM

## 2023-04-03 DIAGNOSIS — I45.10 INCOMPLETE RBBB: ICD-10-CM

## 2023-04-03 DIAGNOSIS — Z01.818 PRE-OP EVALUATION: Primary | ICD-10-CM

## 2023-04-03 DIAGNOSIS — Z98.890 STATUS POST LUMBAR SURGERY: ICD-10-CM

## 2023-04-03 LAB
BILIRUB UR QL: NEGATIVE
CLARITY UR: CLEAR
GLUCOSE UR-MCNC: NORMAL MG/DL
HGB UR QL STRIP.AUTO: NEGATIVE
KETONES UR-MCNC: NEGATIVE MG/DL
LEUKOCYTE ESTERASE UR QL STRIP.AUTO: 25
NITRITE UR QL STRIP.AUTO: NEGATIVE
PH UR: 6.5 [PH] (ref 5–8)
PROT UR-MCNC: NEGATIVE MG/DL
SP GR UR STRIP: 1.01 (ref 1–1.03)
UROBILINOGEN UR STRIP-ACNC: NORMAL

## 2023-04-03 PROCEDURE — 87086 URINE CULTURE/COLONY COUNT: CPT

## 2023-04-03 PROCEDURE — 99214 OFFICE O/P EST MOD 30 MIN: CPT | Performed by: INTERNAL MEDICINE

## 2023-04-03 PROCEDURE — 81001 URINALYSIS AUTO W/SCOPE: CPT

## 2023-04-04 ENCOUNTER — TELEPHONE (OUTPATIENT)
Dept: INTERNAL MEDICINE CLINIC | Facility: CLINIC | Age: 68
End: 2023-04-04

## 2023-04-04 NOTE — TELEPHONE ENCOUNTER
Please let patient know that his urinalysis did show some white cells but there was no signs of infection. For now no treatment for these abnormal findings are needed. We will be faxing to Dr. Benjamin Plenty his preoperative clearance and the results of his urine and urine culture.

## 2023-04-11 ENCOUNTER — PATIENT MESSAGE (OUTPATIENT)
Dept: INTERNAL MEDICINE CLINIC | Facility: CLINIC | Age: 68
End: 2023-04-11

## 2023-04-20 ENCOUNTER — PATIENT MESSAGE (OUTPATIENT)
Dept: INTERNAL MEDICINE CLINIC | Facility: CLINIC | Age: 68
End: 2023-04-20

## 2023-04-20 ENCOUNTER — GENETICS ENCOUNTER (OUTPATIENT)
Dept: GENETICS | Facility: HOSPITAL | Age: 68
End: 2023-04-20
Attending: GENETIC COUNSELOR, MS
Payer: MEDICARE

## 2023-04-20 ENCOUNTER — NURSE ONLY (OUTPATIENT)
Dept: HEMATOLOGY/ONCOLOGY | Facility: HOSPITAL | Age: 68
End: 2023-04-20
Attending: GENETIC COUNSELOR, MS
Payer: MEDICARE

## 2023-04-20 DIAGNOSIS — Z80.3 FAMILY HISTORY OF MALIGNANT NEOPLASM OF BREAST: Primary | ICD-10-CM

## 2023-04-20 DIAGNOSIS — Z80.42 FAMILY HISTORY OF MALIGNANT NEOPLASM OF PROSTATE: ICD-10-CM

## 2023-04-20 PROCEDURE — 96040 HC GENETIC COUNSELING EA 30 MIN: CPT | Performed by: GENETIC COUNSELOR, MS

## 2023-04-20 PROCEDURE — 36415 COLL VENOUS BLD VENIPUNCTURE: CPT

## 2023-04-20 NOTE — TELEPHONE ENCOUNTER
From: Ranjana Ryan  To: Adele Koenig MD  Sent: 4/20/2023 6:19 AM CDT  Subject: Appointment    Hello, thank you. Again it shows an appointment today with you, I actually have a time with Wendy Mccray. I don,t know how this is happening. Sorry for the error.

## 2023-04-20 NOTE — TELEPHONE ENCOUNTER
[facility] desk, please confirm with patient that he will NOT be coming today as I sent him a [email or web address] message regarding this. Please block this time slot for me.

## 2023-04-21 PROBLEM — Z80.3 FAMILY HISTORY OF MALIGNANT NEOPLASM OF BREAST: Status: ACTIVE | Noted: 2023-04-21

## 2023-04-21 PROBLEM — Z80.42 FAMILY HISTORY OF MALIGNANT NEOPLASM OF PROSTATE: Status: ACTIVE | Noted: 2023-04-21

## 2023-04-26 ENCOUNTER — PATIENT MESSAGE (OUTPATIENT)
Dept: INTERNAL MEDICINE CLINIC | Facility: CLINIC | Age: 68
End: 2023-04-26

## 2023-04-27 NOTE — TELEPHONE ENCOUNTER
From: Jesse Reddy  To: Pari Richards MD  Sent: 4/26/2023 7:33 PM CDT  Subject: Lovastatin    Good evening, Dr. Arie Chen,   I was prescribed this by Dr. Batsheva Lima some time ago, It seems like I am in need of a refill. Would you be kind enough to send this across to the [facility]?   Thank you,     Alexander Ortega

## 2023-05-02 ENCOUNTER — TELEPHONE (OUTPATIENT)
Dept: INTERNAL MEDICINE CLINIC | Facility: CLINIC | Age: 68
End: 2023-05-02

## 2023-05-02 ENCOUNTER — HOSPITAL ENCOUNTER (OUTPATIENT)
Dept: GENERAL RADIOLOGY | Facility: HOSPITAL | Age: 68
Discharge: HOME | End: 2023-05-02
Attending: INTERNAL MEDICINE
Payer: MEDICARE

## 2023-05-02 DIAGNOSIS — Z98.1 S/P LUMBAR FUSION: ICD-10-CM

## 2023-05-02 DIAGNOSIS — M81.0 OSTEOPOROSIS, UNSPECIFIED OSTEOPOROSIS TYPE, UNSPECIFIED PATHOLOGICAL FRACTURE PRESENCE: Primary | ICD-10-CM

## 2023-05-02 PROCEDURE — 72100 X-RAY EXAM L-S SPINE 2/3 VWS: CPT | Performed by: INTERNAL MEDICINE

## 2023-05-02 NOTE — TELEPHONE ENCOUNTER
Please notify patient that there was a lumbar x-ray that came into my \"inbox\". I do not remember ordering it? .    He had surgery with  and I thought everything went well postoperatively. Please see if he could remember who ordered the x-ray and why? .    There was nothing particularly abnormal on it.

## 2023-05-03 NOTE — TELEPHONE ENCOUNTER
As FYI to DR. PIZARRO - called patient and relayed DR. PIZARRO message - verbalized understanding. The x-ray was ordered by DR. Mills for F/U post- op

## 2023-05-04 ENCOUNTER — TELEPHONE (OUTPATIENT)
Dept: GENETICS | Facility: HOSPITAL | Age: 68
End: 2023-05-04

## 2023-05-04 NOTE — TELEPHONE ENCOUNTER
Shared NEGATIVE genetic testing results with Delberta Apley over phone. He opted for 47 gene panel with RNA analysis through [facility] ([facility] Common Hereditary Cancers Panel+RNA) and all genes yielded negative results. He was pleased to hear this information. All his questions were answered to the best of my ability and he was appreciative of the call. I will mail him a copy of these results for his records.

## 2023-05-04 NOTE — TELEPHONE ENCOUNTER
Please let patient know that I thought that Dr. Baltazar Later might have been the one to order it. I am hoping that he got the results since it came to my \"inbox. He can call to make sure that happens. On another note there was mention of \"demineralization\". This may relate to a bone condition called osteoporosis. In the future would be beneficial for him to have x-ray called DEXA scan. I placed order. I do not think this is urgent but may be in the next couple months he can get that done to make sure he has not developed osteoporosis.

## 2023-05-04 NOTE — TELEPHONE ENCOUNTER
Spoke with patient and relayed MD's message. Verbalized understanding and agreement with plan. he will schedule dexa scan through [facility].

## 2023-05-04 NOTE — TELEPHONE ENCOUNTER
Thalia Moncada  You 23 minutes ago (2:22 PM)     CL  Negative 47 gene+RNA analysis through [facility].  Marvel Trejo was pleased to hear these results

## 2023-06-05 ENCOUNTER — TELEPHONE (OUTPATIENT)
Dept: INTERNAL MEDICINE CLINIC | Facility: CLINIC | Age: 68
End: 2023-06-05

## 2023-06-05 DIAGNOSIS — G89.29 CHRONIC RIGHT SHOULDER PAIN: Primary | ICD-10-CM

## 2023-06-05 DIAGNOSIS — M25.511 CHRONIC RIGHT SHOULDER PAIN: Primary | ICD-10-CM

## 2023-06-05 NOTE — TELEPHONE ENCOUNTER
----- Message from Jen Roblero sent at 6/4/2023  8:48 PM CDT -----  Regarding: Physical Therapy  Contact: [phone]  Dr. Benedicto Ruano,   As I was finishing up my therapy for my Back surgery, I went over a problem or two. I am having a little less than full mobility with my right arm, stemming from my shoulder. The therapist who I have full confidence in, feels that he can help with some therapy. Would you please write an order for me to receive this help?    Thank you,  Bud Rodriguez

## 2023-06-26 ENCOUNTER — APPOINTMENT (OUTPATIENT)
Dept: URBAN - METROPOLITAN AREA CLINIC 244 | Age: 68
Setting detail: DERMATOLOGY
End: 2023-06-28

## 2023-06-26 DIAGNOSIS — L57.0 ACTINIC KERATOSIS: ICD-10-CM

## 2023-06-26 DIAGNOSIS — L82.1 OTHER SEBORRHEIC KERATOSIS: ICD-10-CM

## 2023-06-26 DIAGNOSIS — L81.4 OTHER MELANIN HYPERPIGMENTATION: ICD-10-CM

## 2023-06-26 DIAGNOSIS — L82.0 INFLAMED SEBORRHEIC KERATOSIS: ICD-10-CM

## 2023-06-26 DIAGNOSIS — D22 MELANOCYTIC NEVI: ICD-10-CM

## 2023-06-26 PROBLEM — D22.5 MELANOCYTIC NEVI OF TRUNK: Status: ACTIVE | Noted: 2023-06-26

## 2023-06-26 PROBLEM — D23.71 OTHER BENIGN NEOPLASM OF SKIN OF RIGHT LOWER LIMB, INCLUDING HIP: Status: ACTIVE | Noted: 2023-06-26

## 2023-06-26 PROCEDURE — OTHER BENIGN DESTRUCTION: OTHER

## 2023-06-26 PROCEDURE — OTHER LIQUID NITROGEN: OTHER

## 2023-06-26 PROCEDURE — 99213 OFFICE O/P EST LOW 20 MIN: CPT | Mod: 25

## 2023-06-26 PROCEDURE — 17000 DESTRUCT PREMALG LESION: CPT | Mod: 59

## 2023-06-26 PROCEDURE — OTHER COUNSELING: OTHER

## 2023-06-26 PROCEDURE — 17110 DESTRUCT B9 LESION 1-14: CPT

## 2023-06-26 PROCEDURE — 17003 DESTRUCT PREMALG LES 2-14: CPT | Mod: 59

## 2023-06-26 ASSESSMENT — LOCATION ZONE DERM
LOCATION ZONE: NECK
LOCATION ZONE: SCALP
LOCATION ZONE: FACE
LOCATION ZONE: TRUNK

## 2023-06-26 ASSESSMENT — LOCATION SIMPLE DESCRIPTION DERM
LOCATION SIMPLE: GLABELLA
LOCATION SIMPLE: FRONTAL SCALP
LOCATION SIMPLE: LEFT ANTERIOR NECK
LOCATION SIMPLE: ABDOMEN

## 2023-06-26 ASSESSMENT — LOCATION DETAILED DESCRIPTION DERM
LOCATION DETAILED: PERIUMBILICAL SKIN
LOCATION DETAILED: MEDIAL FRONTAL SCALP
LOCATION DETAILED: GLABELLA
LOCATION DETAILED: LEFT CLAVICULAR NECK

## 2023-06-26 NOTE — PROCEDURE: LIQUID NITROGEN
Detail Level: Zone
Total Number Of Aks Treated: 8
Render In Bullet Format When Appropriate: No
Consent: The patient's consent was obtained including but not limited to risks of crusting, scabbing, blistering, scarring, darker or lighter pigmentary change, recurrence, incomplete removal and infection.
Duration Of Freeze Thaw-Cycle (Seconds): 0
Post-Care Instructions: I reviewed with the patient in detail post-care instructions. Patient is to wear sunprotection, and avoid picking at any of the treated lesions. Pt may apply Vaseline to crusted or scabbing areas.

## 2023-06-26 NOTE — PROCEDURE: BENIGN DESTRUCTION
Include Z78.9 (Other Specified Conditions Influencing Health Status) As An Associated Diagnosis?: No
Consent: The patient's consent was obtained including but not limited to risks of crusting, scabbing, blistering, scarring, darker or lighter pigmentary change, recurrence, incomplete removal and infection.
Medical Necessity Information: It is in your best interest to select a reason for this procedure from the list below. All of these items fulfill various CMS LCD requirements except the new and changing color options.
Post-Care Instructions: I reviewed with the patient in detail post-care instructions. Patient is to wear sunprotection, and avoid picking at any of the treated lesions. Pt may apply Vaseline to crusted or scabbing areas.
Anesthesia Volume In Cc: 0.5
Treatment Number (Will Not Render If 0): 1
Medical Necessity Clause: This procedure was medically necessary because the lesions that were treated were:
Detail Level: Detailed

## 2023-07-24 ENCOUNTER — TELEPHONE (OUTPATIENT)
Dept: INTERNAL MEDICINE CLINIC | Facility: CLINIC | Age: 68
End: 2023-07-24

## 2023-07-24 NOTE — TELEPHONE ENCOUNTER
Called and spoke with patient. Relayed MD's message. Patient verbalized understanding. Contact info for podiatrists given.

## 2023-07-24 NOTE — TELEPHONE ENCOUNTER
----- Message from Ranjana Ryan sent at 7/21/2023  4:05 PM CDT -----  Regarding: Soreness in the bottom of my foot  Contact: [phone]  Good afternoon, I have been having soreness in the bottom of my left foot,  I still am Mobile. It disappears at night in bed but is there mostly all day. There is nothing in appearance unusual. But sore to the touch in spots.   Thank you,  Nick Pond

## 2023-07-24 NOTE — TELEPHONE ENCOUNTER
To Dr. Srikanth Green:  Pt reporting \"soreness\" sensation on bottom of left foot in arch x2 wks. No discomfort at rest, but present with any walking. Area is sore/tender to touch. No injury/trauma/accident. No bruising or swelling. Pt has not been resting or elevating foot. Not tried any OTCs. Pt inquiring how to proceed -- schedule office visit vs. See specialist.     While on the phone, pt reports left ear is clogged. Reporting muffled hearing in left ear. Started yesterday. No pain or pressure. No drainage. Has swam in a pool recently, but thinks it might be earwax buildup. No acute cold/URI symptoms (denies f/c, sore throat, nasal/sinus/chest congestion, cough). Patient was notified that this message will be routed to the physician to determine what their recommendation (s) would be. In the meantime, if they develop new or worsening symptoms, they were advised to call back or seek emergent evaluation at the ER.

## 2023-07-24 NOTE — TELEPHONE ENCOUNTER
Message and symptoms noted. We could advise the following    Forefoot pain I usually refer to podiatry. We can give him contact information to Dr. Franco Christensen and Dr. Alvin Dunn.  Both are associated with [facility].  He can call for an appointment. As long as there is no signs of infection this is probably better handled by a podiatrist.    2.   In terms of the muffling in the ear we can give it a couple more days and if no relief I can see him by the end of the week to see if there is wax there and if so I will refer him to ENT to have removed.

## 2023-07-28 ENCOUNTER — HOSPITAL ENCOUNTER (OUTPATIENT)
Dept: BONE DENSITY | Facility: HOSPITAL | Age: 68
Discharge: HOME | End: 2023-07-28
Attending: INTERNAL MEDICINE
Payer: MEDICARE

## 2023-07-28 ENCOUNTER — TELEPHONE (OUTPATIENT)
Dept: INTERNAL MEDICINE CLINIC | Facility: CLINIC | Age: 68
End: 2023-07-28

## 2023-07-28 ENCOUNTER — OFFICE VISIT (OUTPATIENT)
Dept: INTERNAL MEDICINE CLINIC | Facility: CLINIC | Age: 68
End: 2023-07-28

## 2023-07-28 VITALS
HEIGHT: 67 IN | OXYGEN SATURATION: 96 % | BODY MASS INDEX: 27.02 KG/M2 | DIASTOLIC BLOOD PRESSURE: 60 MMHG | WEIGHT: 172.19 LBS | SYSTOLIC BLOOD PRESSURE: 90 MMHG | HEART RATE: 61 BPM | TEMPERATURE: 98 F

## 2023-07-28 DIAGNOSIS — M81.0 OSTEOPOROSIS, UNSPECIFIED OSTEOPOROSIS TYPE, UNSPECIFIED PATHOLOGICAL FRACTURE PRESENCE: ICD-10-CM

## 2023-07-28 DIAGNOSIS — H61.22 EXCESSIVE CERUMEN IN EAR CANAL, LEFT: Primary | ICD-10-CM

## 2023-07-28 PROCEDURE — 99213 OFFICE O/P EST LOW 20 MIN: CPT | Performed by: INTERNAL MEDICINE

## 2023-07-28 PROCEDURE — 77080 DXA BONE DENSITY AXIAL: CPT | Performed by: INTERNAL MEDICINE

## 2023-07-28 RX ORDER — LOVASTATIN 40 MG/1
40 TABLET ORAL NIGHTLY
Qty: 90 TABLET | Refills: 3 | Status: SHIPPED | OUTPATIENT
Start: 2023-07-28

## 2023-08-01 ENCOUNTER — OFFICE VISIT (OUTPATIENT)
Dept: PODIATRY CLINIC | Facility: CLINIC | Age: 68
End: 2023-08-01

## 2023-08-01 DIAGNOSIS — M72.2 PLANTAR FASCIITIS OF LEFT FOOT: Primary | ICD-10-CM

## 2023-08-01 DIAGNOSIS — Q82.8 PUNCTATE POROKERATOSIS: ICD-10-CM

## 2023-08-01 PROCEDURE — 99203 OFFICE O/P NEW LOW 30 MIN: CPT | Performed by: PODIATRIST

## 2023-08-01 PROCEDURE — 20550 NJX 1 TENDON SHEATH/LIGAMENT: CPT | Performed by: PODIATRIST

## 2023-08-01 RX ORDER — TRIAMCINOLONE ACETONIDE 40 MG/ML
20 INJECTION, SUSPENSION INTRA-ARTICULAR; INTRAMUSCULAR ONCE
Status: COMPLETED | OUTPATIENT
Start: 2023-08-01 | End: 2023-08-01

## 2023-08-01 RX ADMIN — TRIAMCINOLONE ACETONIDE 20 MG: 40 INJECTION, SUSPENSION INTRA-ARTICULAR; INTRAMUSCULAR at 12:57:00

## 2023-08-01 NOTE — PROGRESS NOTES
Jesse Reddy is a 80year old male. Patient presents with: Foot Pain: Left foot Consult- plantar aspect pain 0-7/10 on and off- onset 2 weeks ago- no injury        HPI:   Patient is here for consultation on his left foot he gets pain on the bottom which can range from 0-7 out of 10 its on and off started about 2 weeks ago no history of injury trauma change in shoe gear or activity. At today's visit reviewed nurse's history as taken above, allergies medications and medical history as documented below. All changes duly noted  Allergies: Patient has no known allergies. Current Outpatient Medications   Medication Sig Dispense Refill    Lovastatin 40 MG Oral Tab Take 1 tablet (40 mg total) by mouth nightly. TAKE AT BEDTIME 90 tablet 3    clonazePAM 0.5 MG Oral Tab Take 1 tablet (0.5 mg total) by mouth daily as needed. 30 tablet 1    sennosides 17.2 MG Oral Tab Take 1 tablet (17.2 mg total) by mouth nightly. 30 tablet 0    sertraline 50 MG Oral Tab Take 1.5 tablets (75 mg total) by mouth daily. 135 tablet 3    cyanocobalamin 100 MCG Oral Tab Take by mouth As Directed. Cholecalciferol 25 MCG (1000 UT) Oral Tab Take by mouth daily.         Past Medical History:   Diagnosis Date    Anxiety state     Cancer ([facility] [address] 2022    bladder    Hearing impairment       Past Surgical History:   Procedure Laterality Date    COLONOSCOPY      CYSTOSCOPY,INSERT URETERAL STENT      HEMORRHOIDECTOMY        Family History   Problem Relation Age of Onset    Prostate Cancer Father 76         metastatic dz    Genetic Disease Daughter         retinitis pigmentosa    Alcohol and Other Disorders Associated Son         autistic    Cancer Maternal Grandmother         leukemia    Cancer Maternal Grandfather         unknown type; smoker    Cancer Sister         lung ca; smoker    Breast Cancer Niece 43    Cancer Paternal Cousin Male         unknown type; smoker      Social History    Socioeconomic History      Marital status:  Tobacco Use      Smoking status: Former        Packs/day: 1.00        Years: 10.00        Pack years: 10        Types: Cigarettes        Quit date: 3/1/1990        Years since quittin.4      Smokeless tobacco: Never    Vaping Use      Vaping Use: Never used    Substance and Sexual Activity      Alcohol use: Yes        Alcohol/week: 3.0 standard drinks of alcohol        Types: 3 Standard drinks or equivalent per week      Drug use: Yes        Types: Cannabis        Comment: daily          REVIEW OF SYSTEMS:   Today reviewed systens as documented below  GENERAL HEALTH: feels well otherwise  SKIN: Refer to exam below  RESPIRATORY: denies shortness of breath with exertion  CARDIOVASCULAR: denies chest pain on exertion  GI: denies abdominal pain and denies heartburn  NEURO: denies headaches    EXAM:   There were no vitals taken for this visit. GENERAL: well developed, well nourished, in no apparent distress  EXTREMITIES:   1. Integument: The skin on his feet was evaluated its warm and dry. He has a punctate porokeratosis on the right foot   2. Vascular: The patient has palpable pulses dorsalis pedis and posterior tibial bilateral that are symmetrical and equivocal with good cap return to the pedal digits   3. Neurologic: The patient has intact sensorium there is no deficits noted   4. Musculoskeletal: The patient has good muscle strength he has pain on palpation mostly the lateral band of the plantar fascia on the left foot. This is in the midportion. X-rays were taken 3 views of the left foot in a weightbearing position no significant osseous abnormality maybe slight arthritis to the second metatarsal cuneiform joint but no fractures or dislocations are noted.     ASSESSMENT AND PLAN:   Diagnoses and all orders for this visit:    Plantar fasciitis of left foot    Other orders  -     Cancel: [facility] AMB POD XR - RT FOOT 3 VIEWS(AP,LAT,OBLIQUE)WT BEARING  -     [facility] AMB POD XR - LT FOOT 3 VIEWS(AP,LAT,OBLIQUE) WT BEARING        Plan: Today educated the patient about the problem dispensed stretching exercises of the find a couple of them and do them at least once daily wear supportive athletic shoe that is a stability shoe such as the Ascs that he came in with. Today discussed the nature and extent of a cortisone injection as well as the possible complications and risks. Patient was in agreement to receive the injection. The patient was consented for a cortisone injection and after timeout was taken the injection consisting of 20 mg Kenalog and 0.5 cc of 0.5% Marcaine plain was injected into the symptomatic plantar fascia of the left foot, using aseptic technique and appropriate approach. A Band-Aid was applied to the injection site. Patient was told not to walk barefoot until tomorrow. Might have numbness there or may be even in the ball of the foot because this injection could have been near the lateral plantar nerves. The patient indicates understanding of these issues and agrees to the plan.     Olga Noel DPM

## 2023-08-01 NOTE — PROGRESS NOTES
Per Dr. Nico Fitzpatrick draw up 0.5ml of 0.5% Marcaine and 0.5ml of Kenalog 40 for injection to left foot.

## 2023-08-17 ENCOUNTER — OFFICE VISIT (OUTPATIENT)
Dept: PODIATRY CLINIC | Facility: CLINIC | Age: 68
End: 2023-08-17

## 2023-08-17 DIAGNOSIS — M72.2 PLANTAR FASCIITIS OF LEFT FOOT: Primary | ICD-10-CM

## 2023-08-17 PROCEDURE — 99213 OFFICE O/P EST LOW 20 MIN: CPT | Performed by: PODIATRIST

## 2023-08-17 NOTE — PROGRESS NOTES
Sherwin Sandoval is a 80year old male. Patient presents with: Follow - Up: Left foot- patient denies pain at this time. HPI:   Patient returns to the clinic for follow-up on his left foot he is doing much better at today's visit reviewed nurse's history as taken above, allergies medications and medical history as documented below. All changes duly noted  Allergies: Patient has no known allergies. Current Outpatient Medications   Medication Sig Dispense Refill    Lovastatin 40 MG Oral Tab Take 1 tablet (40 mg total) by mouth nightly. TAKE AT BEDTIME 90 tablet 3    clonazePAM 0.5 MG Oral Tab Take 1 tablet (0.5 mg total) by mouth daily as needed. 30 tablet 1    sennosides 17.2 MG Oral Tab Take 1 tablet (17.2 mg total) by mouth nightly. 30 tablet 0    sertraline 50 MG Oral Tab Take 1.5 tablets (75 mg total) by mouth daily. 135 tablet 3    cyanocobalamin 100 MCG Oral Tab Take by mouth As Directed. Cholecalciferol 25 MCG (1000 UT) Oral Tab Take by mouth daily.         Past Medical History:   Diagnosis Date    Anxiety state     Cancer ([facility] [address] 2022    bladder    Hearing impairment       Past Surgical History:   Procedure Laterality Date    COLONOSCOPY      CYSTOSCOPY,INSERT URETERAL STENT      HEMORRHOIDECTOMY        Family History   Problem Relation Age of Onset    Prostate Cancer Father 76         metastatic dz    Genetic Disease Daughter         retinitis pigmentosa    Alcohol and Other Disorders Associated Son         autistic    Cancer Maternal Grandmother         leukemia    Cancer Maternal Grandfather         unknown type; smoker    Cancer Sister         lung ca; smoker    Breast Cancer Niece 43    Cancer Paternal Cousin Male         unknown type; smoker      Social History    Socioeconomic History      Marital status:     Tobacco Use      Smoking status: Former        Packs/day: 1.00        Years: 10.00        Pack years: 10        Types: Cigarettes        Quit date: 3/1/1990        Years since quittin.4      Smokeless tobacco: Never    Vaping Use      Vaping Use: Never used    Substance and Sexual Activity      Alcohol use: Yes        Alcohol/week: 3.0 standard drinks of alcohol        Types: 3 Standard drinks or equivalent per week      Drug use: Yes        Types: Cannabis        Comment: daily          REVIEW OF SYSTEMS:   Today reviewed systens as documented below  GENERAL HEALTH: feels well otherwise  SKIN: Refer to exam below  RESPIRATORY: denies shortness of breath with exertion  CARDIOVASCULAR: denies chest pain on exertion  GI: denies abdominal pain and denies heartburn  NEURO: denies headaches    EXAM:   There were no vitals taken for this visit. GENERAL: well developed, well nourished, in no apparent distress  EXTREMITIES:   1. Integument: The skin on the left was evaluated its warm and dry. There are no defects noted   2. Vascular: Patient has palpable pulses both dorsalis pedis and posterior tibial on the left   3. Neurologic: Patient has intact sensorium there are no deficits   4. Musculoskeletal: Patient has only minimal discomfort on palpation of the plantar fascia. ASSESSMENT AND PLAN:   Diagnoses and all orders for this visit:    Plantar fasciitis of left foot        Plan: Advised patient to continue with supportive athletic shoes continue with stretching. I will see him in follow-up as needed for this problem. The patient indicates understanding of these issues and agrees to the plan.     Heidy Hartman DPM

## 2023-08-21 ENCOUNTER — HOSPITAL ENCOUNTER (OUTPATIENT)
Dept: GENERAL RADIOLOGY | Facility: HOSPITAL | Age: 68
Discharge: HOME | End: 2023-08-21
Payer: MEDICARE

## 2023-08-21 DIAGNOSIS — Z98.890 S/P LUMBAR MICRODISCECTOMY: ICD-10-CM

## 2023-08-21 DIAGNOSIS — Z98.1 S/P LUMBAR FUSION: ICD-10-CM

## 2023-08-21 PROCEDURE — 72100 X-RAY EXAM L-S SPINE 2/3 VWS: CPT

## 2023-08-30 ENCOUNTER — ORDER TRANSCRIPTION (OUTPATIENT)
Dept: ADMINISTRATIVE | Facility: HOSPITAL | Age: 68
End: 2023-08-30

## 2023-08-30 DIAGNOSIS — Z13.6 SCREENING FOR CARDIOVASCULAR CONDITION: Primary | ICD-10-CM

## 2023-09-01 ENCOUNTER — HOSPITAL ENCOUNTER (OUTPATIENT)
Dept: ULTRASOUND IMAGING | Age: 68
Discharge: HOME | End: 2023-09-01
Attending: INTERNAL MEDICINE
Payer: MEDICARE

## 2023-09-01 ENCOUNTER — TELEPHONE (OUTPATIENT)
Dept: INTERNAL MEDICINE CLINIC | Facility: CLINIC | Age: 68
End: 2023-09-01

## 2023-09-01 DIAGNOSIS — I70.0 ATHEROSCLEROSIS OF ABDOMINAL AORTA (HCC): ICD-10-CM

## 2023-09-01 PROCEDURE — 76706 US ABDL AORTA SCREEN AAA: CPT | Performed by: INTERNAL MEDICINE

## 2023-09-14 ENCOUNTER — HOSPITAL ENCOUNTER (OUTPATIENT)
Dept: CT IMAGING | Age: 68
Discharge: HOME | End: 2023-09-14
Attending: INTERNAL MEDICINE

## 2023-09-14 DIAGNOSIS — Z13.6 SCREENING FOR CARDIOVASCULAR CONDITION: ICD-10-CM

## 2023-09-14 LAB
POCT GLUCOSE CHOLESTECH: 105 (ref 70–140)
POCT HDL: 40 (ref 45–60)
POCT LDL: 152 (ref 0–99)
POCT TOTAL CHOLESTEROL: 223 (ref 110–200)
POCT TRIGLYCERIDES: 151 (ref 1–149)

## 2023-09-15 ENCOUNTER — TELEPHONE (OUTPATIENT)
Dept: INTERNAL MEDICINE CLINIC | Facility: CLINIC | Age: 68
End: 2023-09-15

## 2023-09-15 NOTE — TELEPHONE ENCOUNTER
From: Shawnee Viera  To: Derrick Montiel  Sent: 9/15/2023 6:44 AM CDT  Subject: Test result    Juan Jose Austin. You had your cholesterol test done yesterday which shows your cholesterol is still elevated at 223. I am wondering if we ever started the Crestor that we talked about recently. If you let me know if you did I can then determine if we need a higher dose going forward. I want to get your LDL down to about 70    Your CT calcium scoring over read is a look at your lungs. There was some mild bibasilar atelectasis or scarring. That is of no concern. The left perifissural nodule is a lymph node and that is also of no concern. The actual coronary artery calcium score takes about 7 days or so to get back. We will wait for that result and then I will let you know next steps. Thank you.

## 2023-09-28 ENCOUNTER — TELEPHONE (OUTPATIENT)
Dept: INTERNAL MEDICINE CLINIC | Facility: CLINIC | Age: 68
End: 2023-09-28

## 2023-09-28 DIAGNOSIS — I25.84 CORONARY ARTERY CALCIFICATION: Primary | ICD-10-CM

## 2023-09-28 DIAGNOSIS — I25.10 CORONARY ARTERY CALCIFICATION: Primary | ICD-10-CM

## 2023-09-29 NOTE — TELEPHONE ENCOUNTER
Please let patient know he has a fair amount of calcification in his heart arteries which sometimes means some narrowing of his heart arteries. Please ask him to see cardiology to see next steps. Maybe a stress test.     I left referral to Dr. Harry Letters.

## 2023-09-29 NOTE — TELEPHONE ENCOUNTER
Spoke with patient and relayed MD's message. Verbalized understanding and agreement with plan. Contact information provided for Dr. Fredrick Jimenez. Advised to call and schedule appointment.

## 2023-11-28 ENCOUNTER — TELEPHONE (OUTPATIENT)
Dept: INTERNAL MEDICINE CLINIC | Facility: CLINIC | Age: 68
End: 2023-11-28

## 2023-11-28 NOTE — TELEPHONE ENCOUNTER
I think the November 30 appointment for Medicare annual will also suffice for his preoperative evaluation.   Therefore no extra appointment needed in my opinion

## 2023-11-28 NOTE — TELEPHONE ENCOUNTER
To FD    Per Dr Arie Chen     I think the November 30 appointment for Medicare   annual will also suffice for his preoperative evaluation.   Therefore no extra appointment needed in my opinion

## 2023-11-28 NOTE — TELEPHONE ENCOUNTER
Received a fax from [facility] stating the patient will be having surgery on 1/12/2024 for a Cystoscopy and Transurethral Resection Bladder Tumor. Patient does not currently have a scheduled pre-op visit with Dr Juany Glass, but is scheduled for a medicare annual on 11/30/2023. Will patient need an additional appointment for a pre-op after his 11/30/2023 appointment? Fax placed in Dr Valentine Bullard with barcode.

## 2023-11-29 NOTE — TELEPHONE ENCOUNTER
Patient called  Procedure with [facility] is scheduled for 1/12/24 11/30 appt will not be within the 30 days  Should patient reschedule 11/30 appt for December?   Please call patient to advise  Patient understood Dr Watson Mccord out of the office today  Tasked to Dr Watson Mccord

## 2023-11-30 ENCOUNTER — TELEPHONE (OUTPATIENT)
Dept: INTERNAL MEDICINE CLINIC | Facility: CLINIC | Age: 68
End: 2023-11-30

## 2023-11-30 NOTE — TELEPHONE ENCOUNTER
Please call patient early. He can reschedule his visit to within 30 days of his Abigail procedure. If he agrees please keep that timeframe blocked. Thank you.

## 2023-11-30 NOTE — TELEPHONE ENCOUNTER
From: Javon Pérez  Sent: 11/30/2023 6:54 AM CST  To: [facility] Clinical Staff  Subject: Appointment    Love Cantu thank you

## 2023-12-18 ENCOUNTER — OFFICE VISIT (OUTPATIENT)
Dept: INTERNAL MEDICINE CLINIC | Facility: CLINIC | Age: 68
End: 2023-12-18
Payer: MEDICARE

## 2023-12-18 ENCOUNTER — LAB ENCOUNTER (OUTPATIENT)
Dept: LAB | Age: 68
End: 2023-12-18
Attending: INTERNAL MEDICINE
Payer: MEDICARE

## 2023-12-18 ENCOUNTER — TELEPHONE (OUTPATIENT)
Dept: INTERNAL MEDICINE CLINIC | Facility: CLINIC | Age: 68
End: 2023-12-18

## 2023-12-18 VITALS
SYSTOLIC BLOOD PRESSURE: 128 MMHG | TEMPERATURE: 99 F | DIASTOLIC BLOOD PRESSURE: 86 MMHG | HEART RATE: 63 BPM | BODY MASS INDEX: 27.31 KG/M2 | OXYGEN SATURATION: 95 % | HEIGHT: 67 IN | WEIGHT: 174 LBS

## 2023-12-18 DIAGNOSIS — Z00.00 ROUTINE HEALTH MAINTENANCE: ICD-10-CM

## 2023-12-18 DIAGNOSIS — E78.5 HYPERLIPIDEMIA, UNSPECIFIED HYPERLIPIDEMIA TYPE: ICD-10-CM

## 2023-12-18 DIAGNOSIS — I45.10 INCOMPLETE RBBB: ICD-10-CM

## 2023-12-18 DIAGNOSIS — C67.9 MALIGNANT NEOPLASM OF URINARY BLADDER, UNSPECIFIED SITE (HCC): ICD-10-CM

## 2023-12-18 DIAGNOSIS — I25.84 CORONARY ARTERY CALCIFICATION: ICD-10-CM

## 2023-12-18 DIAGNOSIS — E78.5 HYPERLIPIDEMIA, UNSPECIFIED HYPERLIPIDEMIA TYPE: Primary | ICD-10-CM

## 2023-12-18 DIAGNOSIS — I70.0 ATHEROSCLEROSIS OF ABDOMINAL AORTA (HCC): ICD-10-CM

## 2023-12-18 DIAGNOSIS — R82.90 ABNORMAL URINALYSIS: ICD-10-CM

## 2023-12-18 DIAGNOSIS — Z01.818 PRE-OP EVALUATION: ICD-10-CM

## 2023-12-18 DIAGNOSIS — Z12.5 PROSTATE CANCER SCREENING: ICD-10-CM

## 2023-12-18 DIAGNOSIS — Z00.00 MEDICARE ANNUAL WELLNESS VISIT, SUBSEQUENT: Primary | ICD-10-CM

## 2023-12-18 DIAGNOSIS — I25.10 CORONARY ARTERY CALCIFICATION: ICD-10-CM

## 2023-12-18 PROBLEM — S32.030A CLOSED COMPRESSION FRACTURE OF THIRD LUMBAR VERTEBRA (HCC): Status: RESOLVED | Noted: 2022-10-20 | Resolved: 2023-12-18

## 2023-12-18 LAB
ALBUMIN SERPL-MCNC: 4.6 G/DL (ref 3.2–4.8)
ALBUMIN/GLOB SERPL: 1.6 {RATIO} (ref 1–2)
ALP LIVER SERPL-CCNC: 71 U/L
ALT SERPL-CCNC: 36 U/L
ANION GAP SERPL CALC-SCNC: 5 MMOL/L (ref 0–18)
AST SERPL-CCNC: 26 U/L (ref ?–34)
ATRIAL RATE: 56 BPM
BASOPHILS # BLD AUTO: 0.11 X10(3) UL (ref 0–0.2)
BASOPHILS NFR BLD AUTO: 1.3 %
BILIRUB DIRECT SERPL-MCNC: 0.1 MG/DL (ref ?–0.3)
BILIRUB SERPL-MCNC: 0.4 MG/DL (ref 0.2–1.1)
BILIRUB UR QL: NEGATIVE
BUN BLD-MCNC: 9 MG/DL (ref 9–23)
BUN/CREAT SERPL: 12.7 (ref 10–20)
CALCIUM BLD-MCNC: 9.4 MG/DL (ref 8.7–10.4)
CHLORIDE SERPL-SCNC: 107 MMOL/L (ref 98–112)
CHOLEST SERPL-MCNC: 203 MG/DL (ref ?–200)
CLARITY UR: CLEAR
CO2 SERPL-SCNC: 28 MMOL/L (ref 21–32)
COMPLEXED PSA SERPL-MCNC: 2.15 NG/ML (ref ?–4)
CREAT BLD-MCNC: 0.71 MG/DL
DEPRECATED RDW RBC AUTO: 41.6 FL (ref 35.1–46.3)
EGFRCR SERPLBLD CKD-EPI 2021: 101 ML/MIN/1.73M2 (ref 60–?)
EOSINOPHIL # BLD AUTO: 0.22 X10(3) UL (ref 0–0.7)
EOSINOPHIL NFR BLD AUTO: 2.7 %
ERYTHROCYTE [DISTWIDTH] IN BLOOD BY AUTOMATED COUNT: 13.3 % (ref 11–15)
FASTING PATIENT LIPID ANSWER: NO
FASTING STATUS PATIENT QL REPORTED: NO
GLOBULIN PLAS-MCNC: 2.8 G/DL (ref 2.8–4.4)
GLUCOSE BLD-MCNC: 89 MG/DL (ref 70–99)
GLUCOSE UR-MCNC: NORMAL MG/DL
HCT VFR BLD AUTO: 42.7 %
HDLC SERPL-MCNC: 50 MG/DL (ref 40–59)
HGB BLD-MCNC: 14.4 G/DL
HGB UR QL STRIP.AUTO: NEGATIVE
IMM GRANULOCYTES # BLD AUTO: 0.03 X10(3) UL (ref 0–1)
IMM GRANULOCYTES NFR BLD: 0.4 %
KETONES UR-MCNC: NEGATIVE MG/DL
LDLC SERPL CALC-MCNC: 138 MG/DL (ref ?–100)
LEUKOCYTE ESTERASE UR QL STRIP.AUTO: NEGATIVE
LYMPHOCYTES # BLD AUTO: 2.45 X10(3) UL (ref 1–4)
LYMPHOCYTES NFR BLD AUTO: 29.6 %
MCH RBC QN AUTO: 28.6 PG (ref 26–34)
MCHC RBC AUTO-ENTMCNC: 33.7 G/DL (ref 31–37)
MCV RBC AUTO: 84.7 FL
MONOCYTES # BLD AUTO: 0.95 X10(3) UL (ref 0.1–1)
MONOCYTES NFR BLD AUTO: 11.5 %
NEUTROPHILS # BLD AUTO: 4.53 X10 (3) UL (ref 1.5–7.7)
NEUTROPHILS # BLD AUTO: 4.53 X10(3) UL (ref 1.5–7.7)
NEUTROPHILS NFR BLD AUTO: 54.5 %
NITRITE UR QL STRIP.AUTO: NEGATIVE
NONHDLC SERPL-MCNC: 153 MG/DL (ref ?–130)
OSMOLALITY SERPL CALC.SUM OF ELEC: 288 MOSM/KG (ref 275–295)
P AXIS: 49 DEGREES
P-R INTERVAL: 156 MS
PH UR: 6.5 [PH] (ref 5–8)
PLATELET # BLD AUTO: 299 10(3)UL (ref 150–450)
POTASSIUM SERPL-SCNC: 4 MMOL/L (ref 3.5–5.1)
PROT SERPL-MCNC: 7.4 G/DL (ref 5.7–8.2)
PROT UR-MCNC: NEGATIVE MG/DL
Q-T INTERVAL: 412 MS
QRS DURATION: 100 MS
QTC CALCULATION (BEZET): 397 MS
R AXIS: -21 DEGREES
RBC # BLD AUTO: 5.04 X10(6)UL
SODIUM SERPL-SCNC: 140 MMOL/L (ref 136–145)
SP GR UR STRIP: 1.01 (ref 1–1.03)
T AXIS: 33 DEGREES
TRIGL SERPL-MCNC: 83 MG/DL (ref 30–149)
UROBILINOGEN UR STRIP-ACNC: NORMAL
VENTRICULAR RATE: 56 BPM
VLDLC SERPL CALC-MCNC: 15 MG/DL (ref 0–30)
WBC # BLD AUTO: 8.3 X10(3) UL (ref 4–11)

## 2023-12-18 PROCEDURE — 93010 ELECTROCARDIOGRAM REPORT: CPT | Performed by: INTERNAL MEDICINE

## 2023-12-18 PROCEDURE — 36415 COLL VENOUS BLD VENIPUNCTURE: CPT

## 2023-12-18 PROCEDURE — 82248 BILIRUBIN DIRECT: CPT

## 2023-12-18 PROCEDURE — 81003 URINALYSIS AUTO W/O SCOPE: CPT

## 2023-12-18 PROCEDURE — 85025 COMPLETE CBC W/AUTO DIFF WBC: CPT

## 2023-12-18 PROCEDURE — 93005 ELECTROCARDIOGRAM TRACING: CPT

## 2023-12-18 PROCEDURE — 80053 COMPREHEN METABOLIC PANEL: CPT

## 2023-12-18 PROCEDURE — 80061 LIPID PANEL: CPT

## 2023-12-18 PROCEDURE — 87086 URINE CULTURE/COLONY COUNT: CPT

## 2023-12-19 ENCOUNTER — TELEPHONE (OUTPATIENT)
Dept: INTERNAL MEDICINE CLINIC | Facility: CLINIC | Age: 68
End: 2023-12-19

## 2023-12-19 RX ORDER — ROSUVASTATIN CALCIUM 20 MG/1
20 TABLET, COATED ORAL NIGHTLY
Qty: 30 TABLET | Refills: 11 | Status: SHIPPED | OUTPATIENT
Start: 2023-12-19

## 2023-12-19 NOTE — TELEPHONE ENCOUNTER
Spoke to [place] and Dr. Kyle Alves office. She indicates she has everything she needs in terms of preoperative paperwork. No further action needed.

## 2023-12-27 NOTE — TELEPHONE ENCOUNTER
Refill request is for a maintenance medication and has met the criteria specified in the Ambulatory Medication Refill Standing Order for eligibility, visits, laboratory, alerts and was sent to the requested pharmacy. Requested Prescriptions     Signed Prescriptions Disp Refills    sertraline 50 MG Oral Tab 135 tablet 3     Sig: Take 1.5 tablets (75 mg total) by mouth daily.      Authorizing Provider: Irean Kawasaki     Ordering User: Garrett Reed

## 2024-01-29 ENCOUNTER — PATIENT MESSAGE (OUTPATIENT)
Dept: INTERNAL MEDICINE CLINIC | Facility: CLINIC | Age: 69
End: 2024-01-29

## 2024-01-29 NOTE — TELEPHONE ENCOUNTER
From: Torey Schroeder  To: Ari Nogueira  Sent: 1/29/2024 2:36 PM CST  Subject: Urologist    Good Afternoon Dr. Nogueira, Can you please refer me to what you think is the best urologist out of the [facility] . I am Starting to get disappointed with Dr. Sanford, We both saw a cancer tumor in my Bladder on the last Cystoscope. I had the procedure to remove it, it wasn't there, IT FELL OFF.. Is this common? He has not given me an answer yet. I have asked more than once. He needs a phone appointment to tell me. Do urologists need to nigel and dime their Patients to make a living?

## 2024-02-08 ENCOUNTER — OFFICE VISIT (OUTPATIENT)
Dept: SURGERY | Facility: CLINIC | Age: 69
End: 2024-02-08
Payer: MEDICARE

## 2024-02-08 VITALS — SYSTOLIC BLOOD PRESSURE: 114 MMHG | DIASTOLIC BLOOD PRESSURE: 64 MMHG | OXYGEN SATURATION: 96 %

## 2024-02-08 DIAGNOSIS — C67.9 MALIGNANT NEOPLASM OF URINARY BLADDER, UNSPECIFIED SITE (HCC): Primary | ICD-10-CM

## 2024-02-08 PROCEDURE — 99204 OFFICE O/P NEW MOD 45 MIN: CPT | Performed by: UROLOGY

## 2024-02-08 NOTE — PROGRESS NOTES
SUBJECTIVE:  Torey Schroeder is a 68 year old male who presents for a consultation at the request of, and a copy of this note will be sent to, Ari Fernandez, for evaluation of  urologic cancer. He states that the problem is unchanged. 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  He was under the care of Dr. Sanford at the time.  Had a surveillance cystoscopy performed in November reportedly demonstrating a questionable mass in the bladder.  At the time of his procedure  a lesion was not detected.  The patient wishes to transfer his urologic care to [place].  No gross hematuria or dysuria.  No bothersome urination symptoms.  IPSS score is 4 quality-of-life index is 0.  Has a remote history of smoking having quit about 39 years ago.  PSA 2023 2.15.. He denies any other complaints.  Urinalysis with microscopy performed 2023 unremarkable.  Allergies: No Known Allergies    History:  Past Medical History:   Diagnosis Date    Anxiety state     Cancer (HCC) 2022    bladder    Closed compression fracture of third lumbar vertebra (HCC)     External hemorrhoids     Hearing impairment       Past Surgical History:   Procedure Laterality Date    COLONOSCOPY      CYSTOSCOPY,INSERT URETERAL STENT      HEMORRHOIDECTOMY        Family History   Problem Relation Age of Onset    Prostate Cancer Father 68         metastatic dz    Genetic Disease Daughter         retinitis pigmentosa    Alcohol and Other Disorders Associated Son         autistic    Cancer Maternal Grandmother         leukemia    Cancer Maternal Grandfather         unknown type; smoker    Cancer Sister         lung ca; smoker    Breast Cancer Niece 42    Cancer Paternal Cousin Male         unknown type; smoker      Social History:   Social History     Socioeconomic History    Marital status:    Tobacco Use    Smoking  status: Former     Packs/day: 1.00     Years: 10.00     Additional pack years: 0.00     Total pack years: 10.00     Types: Cigarettes     Quit date: 3/1/1990     Years since quittin.9    Smokeless tobacco: Never   Vaping Use    Vaping Use: Never used   Substance and Sexual Activity    Alcohol use: Yes     Alcohol/week: 3.0 standard drinks of alcohol     Types: 3 Standard drinks or equivalent per week    Drug use: Yes     Types: Cannabis     Comment: daily            REVIEW OF SYSTEMS:  RESPIRATORY:  Negative for chest tightness, wheezing, cough, shortness of breath,  sputum production,chest pain or pleurisy.  CARDIOVASCULAR:  Negative for pain or chest discomfort, dizziness or fainting, palpitations, leg swelling, nocturia, or claudication.  GASTROINTESTINAL:  Negative for nausea, vomiting, diarrhea, constipation, heartburn or indigestion, abdominal pains, bloody or tarry stools.  GENERAL: Denies:  weight gain, weight loss, fever, night sweats, bone pain, malaise, and fatigue. Positive for:  none  All other review of systems reviewed and otherwise negative    OBJECTIVE:  /64   SpO2 96%   He appears well, in no apparent distress.  Alert and oriented times three, pleasant and cooperative.  CARDIOVASCULAR:normal apical impulse  RESPIRATORY: no chest wall deformities or tenderness  ABDOMEN: abdomen is soft without significant tenderness, masses, organomegaly or guarding  GENITOURINARY:      Penis: no penile lesions or discharge. Meatus normal location and size.      Scrotum: normal in appearance      Right Epididymis and Vas: both are palpably normal.      Right Testis: normal        Left Epididymis and Vas: both are palpably normal.      Left Testis: normal        Inguinal Lymph Nodes: non-palpable both      Prostate: prostate smooth and symmetric without tenderness or nodules, normal in size      Rectal: normal tone, no masses  Skin exam grossly normal  Intact neurologically  grossly  ASSESSMENT/PLAN  Encounter Diagnosis   Name Primary?    Malignant neoplasm of urinary bladder, unspecified site (HCC) Yes   Recommend:  - Surveillance office cystoscopy in 6 weeks.    No orders of the defined types were placed in this encounter.      Meds This Visit:  Requested Prescriptions      No prescriptions requested or ordered in this encounter       Imaging & Referrals:  None

## 2024-03-05 ENCOUNTER — OFFICE VISIT (OUTPATIENT)
Dept: SURGERY | Facility: CLINIC | Age: 69
End: 2024-03-05
Payer: MEDICARE

## 2024-03-05 ENCOUNTER — HOSPITAL ENCOUNTER (OUTPATIENT)
Dept: GENERAL RADIOLOGY | Facility: HOSPITAL | Age: 69
Discharge: HOME | End: 2024-03-05
Payer: MEDICARE

## 2024-03-05 VITALS
WEIGHT: 175 LBS | DIASTOLIC BLOOD PRESSURE: 81 MMHG | SYSTOLIC BLOOD PRESSURE: 128 MMHG | HEIGHT: 67 IN | BODY MASS INDEX: 27.47 KG/M2 | HEART RATE: 68 BPM

## 2024-03-05 DIAGNOSIS — M48.062 SPINAL STENOSIS OF LUMBAR REGION WITH NEUROGENIC CLAUDICATION: Primary | ICD-10-CM

## 2024-03-05 DIAGNOSIS — M48.062 SPINAL STENOSIS OF LUMBAR REGION WITH NEUROGENIC CLAUDICATION: ICD-10-CM

## 2024-03-05 PROCEDURE — 99203 OFFICE O/P NEW LOW 30 MIN: CPT | Performed by: NEUROLOGICAL SURGERY

## 2024-03-05 PROCEDURE — 72100 X-RAY EXAM L-S SPINE 2/3 VWS: CPT

## 2024-03-05 NOTE — PROGRESS NOTES
SHELLY RIVERA M.D., F.A.A.N.S.     of Neurosurgery  [facility]  Board Certified Neurosurgeon                          [facility], [facility] Neurosurgery        [address]  [address] [place] [id number]    PHONE  [phone]          FAX  [phone]    [email or web address]      OFFICE FOLLOW-UP NOTE      Torey Schroeder    : 1955    MRN: [id number]  CSN: [id number]      PCP: Ari Nogueira MD  Referring Provider: No ref. provider found    Insurance: Payor: MEDICARE / Plan: MEDICARE PART B ONLY / Product Type: *No Product type* /     Date of Visit: 3/5/2024    Reason for Visit:   Chief Complaint    Follow - Up                         History of Present Care:  Torey Schroeder is a a(n) 68 year old, male.  1 year postop status post lumbar decompression and L4-5 fusion.  He is doing very well without any residual lower back or lower extremity pain.  He denies any new symptoms and is very happy with the surgical outcome.      History:  .  Past Medical History:   Diagnosis Date    Anxiety state     Cancer (HCC) 2022    bladder    Closed compression fracture of third lumbar vertebra (HCC)     External hemorrhoids     Hearing impairment       Past Surgical History:   Procedure Laterality Date    COLONOSCOPY      CYSTOSCOPY,INSERT URETERAL STENT      HEMORRHOIDECTOMY        Family History   Problem Relation Age of Onset    Prostate Cancer Father 68         metastatic dz    Genetic Disease Daughter         retinitis pigmentosa    Alcohol and Other Disorders Associated Son         autistic    Cancer Maternal Grandmother         leukemia    Cancer Maternal Grandfather         unknown type; smoker    Cancer Sister         lung ca; smoker    Breast Cancer Niece 42    Cancer Paternal Cousin Male         unknown type; smoker      Social History      Socioeconomic History    Marital status:      Spouse name: Not on file    Number of children: Not on file    Years of education: Not on file    Highest education level: Not on file   Occupational History    Not on file   Tobacco Use    Smoking status: Former     Packs/day: 1.00     Years: 10.00     Additional pack years: 0.00     Total pack years: 10.00     Types: Cigarettes     Quit date: 3/1/1990     Years since quittin.0    Smokeless tobacco: Never   Vaping Use    Vaping Use: Never used   Substance and Sexual Activity    Alcohol use: Yes     Alcohol/week: 3.0 standard drinks of alcohol     Types: 3 Standard drinks or equivalent per week    Drug use: Yes     Types: Cannabis     Comment: daily    Sexual activity: Not on file   Other Topics Concern    Not on file   Social History Narrative    Not on file     Social Determinants of Health     Financial Resource Strain: Not on file   Food Insecurity: Not on file   Transportation Needs: Not on file   Physical Activity: Not on file   Stress: Not on file   Social Connections: Not on file   Housing Stability: Not on file        Allergies:  No Known Allergies      Medications:  Current Outpatient Medications   Medication Sig Dispense Refill    sertraline 50 MG Oral Tab Take 1.5 tablets (75 mg total) by mouth daily. 135 tablet 3    rosuvastatin (CRESTOR) 20 MG Oral Tab Take 1 tablet (20 mg total) by mouth nightly. 30 tablet 11    clonazePAM 0.5 MG Oral Tab Take 1 tablet (0.5 mg total) by mouth daily as needed. 30 tablet 1    sennosides 17.2 MG Oral Tab Take 1 tablet (17.2 mg total) by mouth nightly. 30 tablet 0    cyanocobalamin 100 MCG Oral Tab Take by mouth As Directed.      Cholecalciferol 25 MCG (1000 UT) Oral Tab Take by mouth daily.          Review of Systems:  A 10-point system was reviewed.  Pertinent positives and negatives are noted in HPI.      Physical Exam:  /81 (BP Location: Right arm, Patient Position: Sitting, Cuff Size: adult)   Pulse  68   Ht 67\"   Wt 175 lb (79.4 kg)   BMI 27.41 kg/m²         Neurological Exam:    AAOx3, following commands  PERRLA  EOMI  Face symmetrical  Tongue midline  Hearing symmetrical and intact to finger rub    No rhinorrhea or otorrhea    Romberg negative    Motor Strength:  5 out of 5 throughout    Sensation to light touch:  Symmetrical and intact    Incision:  Clean dry and intact    Abdomen:  Soft, non-distended, non-tender, with no rebound or guarding.  No peritoneal signs.     Extremities:  Non-tender, no lower extremity edema noted.      Labs:  CBC:  Lab Results   Component Value Date    WBC 8.3 12/18/2023    HGB 14.4 12/18/2023    HCT 42.7 12/18/2023    MCV 84.7 12/18/2023    .0 12/18/2023      BMG:  Lab Results   Component Value Date     12/18/2023    K 4.0 12/18/2023    CO2 28.0 12/18/2023     12/18/2023    BUN 9 12/18/2023      INR:  Lab Results   Component Value Date    INR 1.08 02/03/2023          Diagnostics:  No current imaging to review    Diagnosis:  1. Spinal stenosis of lumbar region with neurogenic claudication      Assessment/Plan:  Our patient is doing very well with resolution of his preoperative symptoms.  He is very happy with the outcome.  I would like to obtain a current x-ray of the lumbar spine to assess his fusion and he may call us once it has been completed so we can review it.  No further follow-up is necessary unless indicated.    More than 30 minutes were spent during this visit and the coordination of this patient's care. All questions and concerns were addressed. We appreciate the opportunity to participate in the care of this patient. Please do not hesitate to call our office ([phone]) with any issues.        Jose Mills M.D., F.A.A.N.S.    3/5/2024  12:57 PM    This note has been dictated utilizing voice recognition software. Unfortunately, this may lead to occasional typographical errors. If there are any questions regarding this, please do not hesitate to  contact our office.

## 2024-03-18 ENCOUNTER — TELEPHONE (OUTPATIENT)
Dept: SURGERY | Facility: CLINIC | Age: 69
End: 2024-03-18

## 2024-03-18 ENCOUNTER — PROCEDURE (OUTPATIENT)
Dept: SURGERY | Facility: CLINIC | Age: 69
End: 2024-03-18
Payer: MEDICARE

## 2024-03-18 VITALS — RESPIRATION RATE: 18 BRPM | SYSTOLIC BLOOD PRESSURE: 133 MMHG | HEART RATE: 64 BPM | DIASTOLIC BLOOD PRESSURE: 79 MMHG

## 2024-03-18 DIAGNOSIS — Z01.818 PREOP EXAMINATION: ICD-10-CM

## 2024-03-18 DIAGNOSIS — C67.9 MALIGNANT NEOPLASM OF URINARY BLADDER, UNSPECIFIED SITE (HCC): Primary | ICD-10-CM

## 2024-03-18 DIAGNOSIS — R39.89 OTHER SYMPTOMS AND SIGNS INVOLVING THE GENITOURINARY SYSTEM: ICD-10-CM

## 2024-03-18 RX ORDER — CIPROFLOXACIN 500 MG/1
500 TABLET, FILM COATED ORAL ONCE
Status: COMPLETED | OUTPATIENT
Start: 2024-03-18 | End: 2024-03-18

## 2024-03-18 RX ADMIN — CIPROFLOXACIN 500 MG: 500 TABLET, FILM COATED ORAL at 13:40:00

## 2024-03-18 NOTE — PROGRESS NOTES
Torey Schroeder is a 68 year old male.    HPI:   No chief complaint on file.      68-year-old male with a history of low-grade noninvasive bladder cancer diagnosed originally on TURBT at [facility] May 2022.  Previously under the care of Dr. Sanford.  Had a surveillance cystoscopy 2023 showing a questionable mass in the bladder.  At the time of his procedure in January of this year a lesion was reportedly not detected.  He wanted to transfer his urologic care to [place].  No gross hematuria or dysuria.  PSA 2023 2.15.      HISTORY:  Past Medical History:   Diagnosis Date    Anxiety state     Cancer (HCC) 2022    bladder    Closed compression fracture of third lumbar vertebra (HCC)     External hemorrhoids     Hearing impairment       Past Surgical History:   Procedure Laterality Date    COLONOSCOPY      CYSTOSCOPY,INSERT URETERAL STENT      HEMORRHOIDECTOMY        Family History   Problem Relation Age of Onset    Prostate Cancer Father 68         metastatic dz    Genetic Disease Daughter         retinitis pigmentosa    Alcohol and Other Disorders Associated Son         autistic    Cancer Maternal Grandmother         leukemia    Cancer Maternal Grandfather         unknown type; smoker    Cancer Sister         lung ca; smoker    Breast Cancer Niece 42    Cancer Paternal Cousin Male         unknown type; smoker      Social History:   Social History     Socioeconomic History    Marital status:    Tobacco Use    Smoking status: Former     Packs/day: 1.00     Years: 10.00     Additional pack years: 0.00     Total pack years: 10.00     Types: Cigarettes     Quit date: 3/1/1990     Years since quittin.0    Smokeless tobacco: Never   Vaping Use    Vaping Use: Never used   Substance and Sexual Activity    Alcohol use: Yes     Alcohol/week: 3.0 standard drinks of alcohol     Types: 3 Standard drinks or equivalent per week    Drug use: Yes     Types: Cannabis     Comment:  daily        Medications (Active prior to today's visit):  Current Outpatient Medications   Medication Sig Dispense Refill    CLONAZEPAM 0.5 MG Oral Tab TAKE 1 TABLET(0.5 MG) BY MOUTH DAILY AS NEEDED 30 tablet 1    sertraline 50 MG Oral Tab Take 1.5 tablets (75 mg total) by mouth daily. 135 tablet 3    rosuvastatin (CRESTOR) 20 MG Oral Tab Take 1 tablet (20 mg total) by mouth nightly. 30 tablet 11    sennosides 17.2 MG Oral Tab Take 1 tablet (17.2 mg total) by mouth nightly. 30 tablet 0    cyanocobalamin 100 MCG Oral Tab Take by mouth As Directed.      Cholecalciferol 25 MCG (1000 UT) Oral Tab Take by mouth daily.         Allergies:  No Known Allergies      ROS:       PHYSICAL EXAM:   Torey Schroeder  : 1955  Referring Physician: No ref. provider found     No chief complaint on file.          CYSTOSCOPY    Anesthesia:  2% lidocaine gel    Urethra: Normal  Prostate / Pelvic: Normal  Bladder: Abnormal 1 cm papillary tumor along the bladder floor left of midline proximal to the trigone .  No  stone, diverticulum, or glomerulation  U.O's: Normal  Trabeculation: grade 2      POST CYSTOSCOPY MEDICATIONS: sample one tablet Cipro 500mg given to patient    DIAGNOSIS:     PLAN: see below       ASSESSMENT/PLAN:   Assessment   Encounter Diagnosis   Name Primary?    Malignant neoplasm of urinary bladder, unspecified site (HCC) Yes       Reviewed findings at length with patient.  Recommended cystoscopy under anesthesia and TURBT.  Intravesical gemcitabine instillation will be performed at that time.  Risks and side effects reviewed with patient.  He will be scheduled accordingly.         Orders This Visit:  No orders of the defined types were placed in this encounter.      Meds This Visit:  Requested Prescriptions      No prescriptions requested or ordered in this encounter       Imaging & Referrals:  None     3/18/2024  Lynda Madrid MD

## 2024-03-18 NOTE — TELEPHONE ENCOUNTER
Juan Jose Yanez,  This patient needs to be scheduled as follows:  Diagnosis: Bladder cancer  Procedure: Cystoscopy TURBT  Site: [facility]  Antibiotics on-call to the operating room: Ancef 2 g IV on-call to the OR  Preoperative labs: CBC, BMP, urine culture, EKG    Needs intravesical gemcitabine instillation as per standard.

## 2024-03-19 NOTE — TELEPHONE ENCOUNTER
Spoke with patient scheduled Cystoscopy Transurethral Resection Bladder Tumor, Wednesday 04/03/2024, went over pre-op/lab instructions, all will be sent to patients my chart.    Please transfer call to [phone] if patient calls with questions.

## 2024-03-26 ENCOUNTER — LAB ENCOUNTER (OUTPATIENT)
Dept: LAB | Age: 69
End: 2024-03-26
Attending: UROLOGY
Payer: MEDICARE

## 2024-03-26 DIAGNOSIS — Z01.818 PREOP EXAMINATION: ICD-10-CM

## 2024-03-26 DIAGNOSIS — E78.5 HYPERLIPIDEMIA, UNSPECIFIED HYPERLIPIDEMIA TYPE: ICD-10-CM

## 2024-03-26 LAB
ALT SERPL-CCNC: 43 U/L
ANION GAP SERPL CALC-SCNC: 5 MMOL/L (ref 0–18)
AST SERPL-CCNC: 30 U/L (ref ?–34)
BASOPHILS # BLD AUTO: 0.14 X10(3) UL (ref 0–0.2)
BASOPHILS NFR BLD AUTO: 1.3 %
BUN BLD-MCNC: 14 MG/DL (ref 9–23)
BUN/CREAT SERPL: 17.7 (ref 10–20)
CALCIUM BLD-MCNC: 9.7 MG/DL (ref 8.7–10.4)
CHLORIDE SERPL-SCNC: 106 MMOL/L (ref 98–112)
CHOLEST SERPL-MCNC: 181 MG/DL (ref ?–200)
CO2 SERPL-SCNC: 27 MMOL/L (ref 21–32)
CREAT BLD-MCNC: 0.79 MG/DL
DEPRECATED RDW RBC AUTO: 41.1 FL (ref 35.1–46.3)
EGFRCR SERPLBLD CKD-EPI 2021: 97 ML/MIN/1.73M2 (ref 60–?)
EOSINOPHIL # BLD AUTO: 0.28 X10(3) UL (ref 0–0.7)
EOSINOPHIL NFR BLD AUTO: 2.6 %
ERYTHROCYTE [DISTWIDTH] IN BLOOD BY AUTOMATED COUNT: 13.3 % (ref 11–15)
FASTING PATIENT LIPID ANSWER: YES
FASTING STATUS PATIENT QL REPORTED: YES
GLUCOSE BLD-MCNC: 130 MG/DL (ref 70–99)
HCT VFR BLD AUTO: 42.3 %
HDLC SERPL-MCNC: 44 MG/DL (ref 40–59)
HGB BLD-MCNC: 14.9 G/DL
IMM GRANULOCYTES # BLD AUTO: 0.04 X10(3) UL (ref 0–1)
IMM GRANULOCYTES NFR BLD: 0.4 %
LDLC SERPL CALC-MCNC: 102 MG/DL (ref ?–100)
LYMPHOCYTES # BLD AUTO: 2.96 X10(3) UL (ref 1–4)
LYMPHOCYTES NFR BLD AUTO: 27.3 %
MCH RBC QN AUTO: 29.7 PG (ref 26–34)
MCHC RBC AUTO-ENTMCNC: 35.2 G/DL (ref 31–37)
MCV RBC AUTO: 84.3 FL
MONOCYTES # BLD AUTO: 1.15 X10(3) UL (ref 0.1–1)
MONOCYTES NFR BLD AUTO: 10.6 %
NEUTROPHILS # BLD AUTO: 6.29 X10 (3) UL (ref 1.5–7.7)
NEUTROPHILS # BLD AUTO: 6.29 X10(3) UL (ref 1.5–7.7)
NEUTROPHILS NFR BLD AUTO: 57.8 %
NONHDLC SERPL-MCNC: 137 MG/DL (ref ?–130)
OSMOLALITY SERPL CALC.SUM OF ELEC: 288 MOSM/KG (ref 275–295)
PLATELET # BLD AUTO: 312 10(3)UL (ref 150–450)
POTASSIUM SERPL-SCNC: 4.1 MMOL/L (ref 3.5–5.1)
RBC # BLD AUTO: 5.02 X10(6)UL
SODIUM SERPL-SCNC: 138 MMOL/L (ref 136–145)
TRIGL SERPL-MCNC: 201 MG/DL (ref 30–149)
VLDLC SERPL CALC-MCNC: 34 MG/DL (ref 0–30)
WBC # BLD AUTO: 10.9 X10(3) UL (ref 4–11)

## 2024-03-26 PROCEDURE — 84450 TRANSFERASE (AST) (SGOT): CPT

## 2024-03-26 PROCEDURE — 80061 LIPID PANEL: CPT

## 2024-03-26 PROCEDURE — 36415 COLL VENOUS BLD VENIPUNCTURE: CPT

## 2024-03-26 PROCEDURE — 84460 ALANINE AMINO (ALT) (SGPT): CPT

## 2024-03-26 PROCEDURE — 87086 URINE CULTURE/COLONY COUNT: CPT | Performed by: UROLOGY

## 2024-03-26 PROCEDURE — 85025 COMPLETE CBC W/AUTO DIFF WBC: CPT

## 2024-03-26 PROCEDURE — 80048 BASIC METABOLIC PNL TOTAL CA: CPT

## 2024-03-28 ENCOUNTER — TELEPHONE (OUTPATIENT)
Dept: INTERNAL MEDICINE CLINIC | Facility: CLINIC | Age: 69
End: 2024-03-28

## 2024-03-29 NOTE — TELEPHONE ENCOUNTER
From: Torey Schroeder  Sent: 3/29/2024 7:43 AM CDT  To: [facility] Clinical Staff  Subject: Cholesterol result    I have no problem in increasing the dose. Are there any potential side effects, I should concern myself with?

## 2024-04-02 NOTE — DISCHARGE INSTRUCTIONS
HOME INSTRUCTIONS  The medication that you received for sedation or general anesthesia can last up to 24 hours. Your judgment and reflexes may be altered, even if you feel like your normal self.      Please avoid heavy lifting for 2 weeks.    Please avoid sexual activity for 2 weeks.    Please make an appointment to see me in the office in 2 weeks.    [facility] HOME CARE INSTRUCTIONS: POST-OP ANESTHESIA    We Recommend:   Do not drive any motor vehicle or bicycle   Avoid mowing the lawn, playing sports, or working with power tools/applicances (power saws, electric knives or mixers)   That you have someone stay with you on your first night home   Do not drink alcohol or take sleeping pills or tranquilizers   Do not sign legal documents within 24 hours of your procedure   If you had a nerve block for your surgery, take extra care not to put any pressure on your arm or hand for 24 hours    It is normal:  For you to have a sore throat if you had a breathing tube during surgery (while you were asleep!). The sore throat should get better within 48 hours. You can gargle with warm salt water (1/2 tsp in 4 oz warm water) or use a throat lozenge for comfort  To feel muscle aches or soreness especially in the abdomen, chest or neck. The achy feeling should go away in the next 24 hours  To feel weak, sleepy or \"wiped out\". Your should start feeling better in the next 24 hours.   To experience mild discomforts such as sore lip or tongue, headache, cramps, gas pains or a bloated feeling in your abdomen.   To experience mild back pain or soreness for a day or two if you had spinal or epidural anesthesia.   If you had laparoscopic surgery, to feel shoulder pain or discomfort on the day of surgery.   For some patients to have nausea after surgery/anesthesia    If you feel nausea or experience vomiting:   Try to move around less.   Eat less than usual or drink only liquids until the next morning   Nausea should resolve in about 24  hours    If you have a problem when you are at home:    Call your surgeons office   Discharge Instructions: After Your Surgery  You’ve just had surgery. During surgery, you were given medicine called anesthesia to keep you relaxed and free of pain. After surgery, you may have some pain or nausea. This is common. Here are some tips for feeling better and getting well after surgery.   Going home  Your healthcare provider will show you how to take care of yourself when you go home. They'll also answer your questions. Have an adult family member or friend drive you home. For the first 24 hours after your surgery:   Don't drive or use heavy equipment.  Don't make important decisions or sign legal papers.  Take medicines as directed.  Don't drink alcohol.  Have someone stay with you, if needed. They can watch for problems and help keep you safe.  Be sure to go to all follow-up visits with your healthcare provider. And rest after your surgery for as long as your provider tells you to.   Coping with pain  If you have pain after surgery, pain medicine will help you feel better. Take it as directed, before pain becomes severe. Also, ask your healthcare provider or pharmacist about other ways to control pain. This might be with heat, ice, or relaxation. And follow any other instructions your surgeon or nurse gives you.      Stay on schedule with your medicine.     Tips for taking pain medicine  To get the best relief possible, remember these points:   Pain medicines can upset your stomach. Taking them with a little food may help.  Most pain relievers taken by mouth need at least 20 to 30 minutes to start to work.  Don't wait till your pain becomes severe before you take your medicine. Try to time your medicine so that you can take it before starting an activity. This might be before you get dressed, go for a walk, or sit down for dinner.  Constipation is a common side effect of some pain medicines. Call your healthcare  provider before taking any medicines such as laxatives or stool softeners to help ease constipation. Also ask if you should skip any foods. Drinking lots of fluids and eating foods such as fruits and vegetables that are high in fiber can also help. Remember, don't take laxatives unless your surgeon has prescribed them.  Drinking alcohol and taking pain medicine can cause dizziness and slow your breathing. It can even be deadly. Don't drink alcohol while taking pain medicine.  Pain medicine can make you react more slowly to things. Don't drive or run machinery while taking pain medicine.  Your healthcare provider may tell you to take acetaminophen to help ease your pain. Ask them how much you're supposed to take each day. Acetaminophen or other pain relievers may interact with your prescription medicines or other over-the-counter (OTC) medicines. Some prescription medicines have acetaminophen and other ingredients in them. Using both prescription and OTC acetaminophen for pain can cause you to accidentally overdose. Read the labels on your OTC medicines with care. This will help you to clearly know the list of ingredients, how much to take, and any warnings. It may also help you not take too much acetaminophen. If you have questions or don't understand the information, ask your pharmacist or healthcare provider to explain it to you before you take the OTC medicine.   Managing nausea  Some people have an upset stomach (nausea) after surgery. This is often because of anesthesia, pain, or pain medicine, less movement of food in the stomach, or the stress of surgery. These tips will help you handle nausea and eat healthy foods as you get better. If you were on a special food plan before surgery, ask your healthcare provider if you should follow it while you get better. Check with your provider on how your eating should progress. It may depend on the surgery you had. These general tips may help:   Don't push yourself to  eat. Your body will tell you when to eat and how much.  Start off with clear liquids and soup. They're easier to digest.  Next try semi-solid foods as you feel ready. These include mashed potatoes, applesauce, and gelatin.  Slowly move to solid foods. Don’t eat fatty, rich, or spicy foods at first.  Don't force yourself to have 3 large meals a day. Instead eat smaller amounts more often.  Take pain medicines with a small amount of solid food, such as crackers or toast. This helps prevent nausea.  When to call your healthcare provider  Call your healthcare provider right away if you have any of these:   You still have too much pain, or the pain gets worse, after taking the medicine. The medicine may not be strong enough. Or there may be a complication from the surgery.  You feel too sleepy, dizzy, or groggy. The medicine may be too strong.  Side effects such as nausea or vomiting. Your healthcare provider may advise taking other medicines to .  Skin changes such as rash, itching, or hives. This may mean you have an allergic reaction. Your provider may advise taking other medicines.  The incision looks different (for instance, part of it opens up).  Bleeding or fluid leaking from the incision site, and weren't told to expect that.  Fever of 100.4°F (38°C) or higher, or as directed by your provider.  Call 911  Call 911 right away if you have:   Trouble breathing  Facial swelling    If you have obstructive sleep apnea   You were given anesthesia medicine during surgery to keep you comfortable and free of pain. After surgery, you may have more apnea spells because of this medicine and other medicines you were given. The spells may last longer than normal.    At home:  Keep using the continuous positive airway pressure (CPAP) device when you sleep. Unless your healthcare provider tells you not to, use it when you sleep, day or night. CPAP is a common device used to treat obstructive sleep apnea.  Talk with your provider  before taking any pain medicine, muscle relaxants, or sedatives. Your provider will tell you about the possible dangers of taking these medicines.  Contact your provider if your sleeping changes a lot even when taking medicines as directed.  Jesse last reviewed this educational content on 10/1/2021  © 7020-6027 [employer]. All rights reserved. This information is not intended as a substitute for professional medical care. Always follow your healthcare professional's instructions.

## 2024-04-03 ENCOUNTER — ANESTHESIA EVENT (OUTPATIENT)
Dept: SURGERY | Facility: HOSPITAL | Age: 69
End: 2024-04-03
Payer: MEDICARE

## 2024-04-03 ENCOUNTER — APPOINTMENT (OUTPATIENT)
Dept: GENERAL RADIOLOGY | Facility: HOSPITAL | Age: 69
End: 2024-04-03
Attending: UROLOGY
Payer: MEDICARE

## 2024-04-03 ENCOUNTER — ANESTHESIA (OUTPATIENT)
Dept: SURGERY | Facility: HOSPITAL | Age: 69
End: 2024-04-03
Payer: MEDICARE

## 2024-04-03 ENCOUNTER — HOSPITAL ENCOUNTER (OUTPATIENT)
Facility: HOSPITAL | Age: 69
Setting detail: HOSPITAL OUTPATIENT SURGERY
Discharge: HOME | End: 2024-04-03
Attending: UROLOGY | Admitting: UROLOGY
Payer: MEDICARE

## 2024-04-03 VITALS
HEIGHT: 67 IN | RESPIRATION RATE: 16 BRPM | SYSTOLIC BLOOD PRESSURE: 121 MMHG | OXYGEN SATURATION: 97 % | DIASTOLIC BLOOD PRESSURE: 82 MMHG | TEMPERATURE: 98 F | WEIGHT: 174 LBS | HEART RATE: 60 BPM | BODY MASS INDEX: 27.31 KG/M2

## 2024-04-03 DIAGNOSIS — C67.9 MALIGNANT NEOPLASM OF URINARY BLADDER, UNSPECIFIED SITE (HCC): ICD-10-CM

## 2024-04-03 PROCEDURE — 3E0K705 INTRODUCTION OF OTHER ANTINEOPLASTIC INTO GENITOURINARY TRACT, VIA NATURAL OR ARTIFICIAL OPENING: ICD-10-PCS | Performed by: UROLOGY

## 2024-04-03 PROCEDURE — 51720 TREATMENT OF BLADDER LESION: CPT | Performed by: UROLOGY

## 2024-04-03 PROCEDURE — 0TBB8ZX EXCISION OF BLADDER, VIA NATURAL OR ARTIFICIAL OPENING ENDOSCOPIC, DIAGNOSTIC: ICD-10-PCS | Performed by: UROLOGY

## 2024-04-03 PROCEDURE — 0T5B8ZZ DESTRUCTION OF BLADDER, VIA NATURAL OR ARTIFICIAL OPENING ENDOSCOPIC: ICD-10-PCS | Performed by: UROLOGY

## 2024-04-03 PROCEDURE — 52234 CYSTOSCOPY AND TREATMENT: CPT | Performed by: UROLOGY

## 2024-04-03 RX ORDER — PHENAZOPYRIDINE HYDROCHLORIDE 200 MG/1
200 TABLET, FILM COATED ORAL ONCE
Status: DISCONTINUED | OUTPATIENT
Start: 2024-04-03 | End: 2024-04-03

## 2024-04-03 RX ORDER — GLYCOPYRROLATE 0.2 MG/ML
INJECTION, SOLUTION INTRAMUSCULAR; INTRAVENOUS AS NEEDED
Status: DISCONTINUED | OUTPATIENT
Start: 2024-04-03 | End: 2024-04-03 | Stop reason: SURG

## 2024-04-03 RX ORDER — ONDANSETRON 2 MG/ML
INJECTION INTRAMUSCULAR; INTRAVENOUS AS NEEDED
Status: DISCONTINUED | OUTPATIENT
Start: 2024-04-03 | End: 2024-04-03 | Stop reason: SURG

## 2024-04-03 RX ORDER — EPHEDRINE SULFATE 50 MG/ML
INJECTION INTRAVENOUS AS NEEDED
Status: DISCONTINUED | OUTPATIENT
Start: 2024-04-03 | End: 2024-04-03 | Stop reason: SURG

## 2024-04-03 RX ORDER — CEFAZOLIN SODIUM/WATER 2 G/20 ML
2 SYRINGE (ML) INTRAVENOUS ONCE
Status: COMPLETED | OUTPATIENT
Start: 2024-04-03 | End: 2024-04-03

## 2024-04-03 RX ORDER — MORPHINE SULFATE 4 MG/ML
4 INJECTION, SOLUTION INTRAMUSCULAR; INTRAVENOUS EVERY 10 MIN PRN
Status: DISCONTINUED | OUTPATIENT
Start: 2024-04-03 | End: 2024-04-03

## 2024-04-03 RX ORDER — SODIUM CHLORIDE, SODIUM LACTATE, POTASSIUM CHLORIDE, CALCIUM CHLORIDE 600; 310; 30; 20 MG/100ML; MG/100ML; MG/100ML; MG/100ML
INJECTION, SOLUTION INTRAVENOUS CONTINUOUS
Status: DISCONTINUED | OUTPATIENT
Start: 2024-04-03 | End: 2024-04-03

## 2024-04-03 RX ORDER — HYDROMORPHONE HYDROCHLORIDE 1 MG/ML
0.4 INJECTION, SOLUTION INTRAMUSCULAR; INTRAVENOUS; SUBCUTANEOUS EVERY 5 MIN PRN
Status: DISCONTINUED | OUTPATIENT
Start: 2024-04-03 | End: 2024-04-03

## 2024-04-03 RX ORDER — ACETAMINOPHEN 500 MG
1000 TABLET ORAL ONCE
Status: COMPLETED | OUTPATIENT
Start: 2024-04-03 | End: 2024-04-03

## 2024-04-03 RX ORDER — MORPHINE SULFATE 10 MG/ML
6 INJECTION, SOLUTION INTRAMUSCULAR; INTRAVENOUS EVERY 10 MIN PRN
Status: DISCONTINUED | OUTPATIENT
Start: 2024-04-03 | End: 2024-04-03

## 2024-04-03 RX ORDER — CEFADROXIL 500 MG/1
500 CAPSULE ORAL 2 TIMES DAILY
Qty: 6 CAPSULE | Refills: 0 | Status: SHIPPED | OUTPATIENT
Start: 2024-04-03 | End: 2024-04-06

## 2024-04-03 RX ORDER — NALOXONE HYDROCHLORIDE 0.4 MG/ML
0.08 INJECTION, SOLUTION INTRAMUSCULAR; INTRAVENOUS; SUBCUTANEOUS AS NEEDED
Status: DISCONTINUED | OUTPATIENT
Start: 2024-04-03 | End: 2024-04-03

## 2024-04-03 RX ORDER — HYDROMORPHONE HYDROCHLORIDE 1 MG/ML
0.2 INJECTION, SOLUTION INTRAMUSCULAR; INTRAVENOUS; SUBCUTANEOUS EVERY 5 MIN PRN
Status: DISCONTINUED | OUTPATIENT
Start: 2024-04-03 | End: 2024-04-03

## 2024-04-03 RX ORDER — LIDOCAINE HYDROCHLORIDE 20 MG/ML
INJECTION, SOLUTION EPIDURAL; INFILTRATION; INTRACAUDAL; PERINEURAL AS NEEDED
Status: DISCONTINUED | OUTPATIENT
Start: 2024-04-03 | End: 2024-04-03 | Stop reason: SURG

## 2024-04-03 RX ORDER — DEXAMETHASONE SODIUM PHOSPHATE 4 MG/ML
VIAL (ML) INJECTION AS NEEDED
Status: DISCONTINUED | OUTPATIENT
Start: 2024-04-03 | End: 2024-04-03 | Stop reason: SURG

## 2024-04-03 RX ORDER — MORPHINE SULFATE 4 MG/ML
2 INJECTION, SOLUTION INTRAMUSCULAR; INTRAVENOUS EVERY 10 MIN PRN
Status: DISCONTINUED | OUTPATIENT
Start: 2024-04-03 | End: 2024-04-03

## 2024-04-03 RX ORDER — HYDROMORPHONE HYDROCHLORIDE 1 MG/ML
0.6 INJECTION, SOLUTION INTRAMUSCULAR; INTRAVENOUS; SUBCUTANEOUS EVERY 5 MIN PRN
Status: DISCONTINUED | OUTPATIENT
Start: 2024-04-03 | End: 2024-04-03

## 2024-04-03 RX ADMIN — DEXAMETHASONE SODIUM PHOSPHATE 4 MG: 4 MG/ML VIAL (ML) INJECTION at 12:20:00

## 2024-04-03 RX ADMIN — SODIUM CHLORIDE, SODIUM LACTATE, POTASSIUM CHLORIDE, CALCIUM CHLORIDE: 600; 310; 30; 20 INJECTION, SOLUTION INTRAVENOUS at 12:14:00

## 2024-04-03 RX ADMIN — CEFAZOLIN SODIUM/WATER 2 G: 2 G/20 ML SYRINGE (ML) INTRAVENOUS at 12:20:00

## 2024-04-03 RX ADMIN — ONDANSETRON 4 MG: 2 INJECTION INTRAMUSCULAR; INTRAVENOUS at 12:20:00

## 2024-04-03 RX ADMIN — EPHEDRINE SULFATE 10 MG: 50 INJECTION INTRAVENOUS at 12:30:00

## 2024-04-03 RX ADMIN — GLYCOPYRROLATE 0.2 MG: 0.2 INJECTION, SOLUTION INTRAMUSCULAR; INTRAVENOUS at 12:17:00

## 2024-04-03 RX ADMIN — LIDOCAINE HYDROCHLORIDE 60 MG: 20 INJECTION, SOLUTION EPIDURAL; INFILTRATION; INTRACAUDAL; PERINEURAL at 12:17:00

## 2024-04-03 NOTE — ANESTHESIA POSTPROCEDURE EVALUATION
Patient: Torey Schroeder    Procedure Summary       Date: 04/03/24 Room / Location: [facility] MAIN OR  / [facility] MAIN OR    Anesthesia Start: 1214 Anesthesia Stop:     Procedure: Cystoscopy transurethral resection bladder tumor, gemcitabine bladder instillation intraoperatively Diagnosis:       Malignant neoplasm of urinary bladder, unspecified site (HCC)      (Malignant neoplasm of urinary bladder, unspecified site (HCC) [C67.9])    Surgeons: Lynda Madrid MD Anesthesiologist: Lowell Garcia MD    Anesthesia Type: general ASA Status: 2            Anesthesia Type: general    Vitals Value Taken Time   /91 04/03/24 1258   Temp 97.2 °F (36.2 °C) 04/03/24 1258   Pulse 70 04/03/24 1259   Resp 13 04/03/24 1259   SpO2 97 % 04/03/24 1259   Vitals shown include unfiled device data.    [facility] AN Post Evaluation:   Patient Evaluated in PACU  Patient Participation: complete - patient participated  Level of Consciousness: awake and alert  Pain Score: 0  Pain Management: adequate  Airway Patency:patent  Dental exam unchanged from preop  Yes    Nausea/Vomiting: none  Cardiovascular Status: acceptable  Respiratory Status: acceptable  Postoperative Hydration acceptable    Bridgett Aguilar CRNA  4/3/2024 12:59 PM

## 2024-04-03 NOTE — ANESTHESIA PREPROCEDURE EVALUATION
Anesthesia PreOp Note    HPI:     Torey Schroeder is a 68 year old male who presents for preoperative consultation requested by: Lynda Madrid MD    Date of Surgery: 4/3/2024    Procedure(s):  Cystoscopy transurethral resection bladder tumor, gemcitabine bladder instillation intraoperatively  Indication: Malignant neoplasm of urinary bladder, unspecified site (HCC) [C67.9]    Relevant Problems   No relevant active problems       NPO:  Last Liquid Consumption Date: 04/02/24  Last Liquid Consumption Time: 1900  Last Solid Consumption Date: 04/02/24  Last Solid Consumption Time: 1900  Last Liquid Consumption Date: 04/02/24          History Review:  Patient Active Problem List    Diagnosis Date Noted    Family history of malignant neoplasm of breast 04/21/2023    Family history of malignant neoplasm of prostate 04/21/2023    Spinal stenosis of lumbar region with neurogenic claudication 02/22/2023    Hyperlipidemia 10/20/2022    Incomplete RBBB 10/20/2022    Malignant neoplasm of urinary bladder (HCC) 10/20/2022    Enlarged prostate 10/20/2022       Past Medical History:   Diagnosis Date    Anxiety state     Back problem     Cancer (HCC) 05/2022    bladder    Closed compression fracture of third lumbar vertebra (HCC)     External hemorrhoids     Hearing impairment     High cholesterol     Osteoarthritis        Past Surgical History:   Procedure Laterality Date    COLONOSCOPY      CYSTOSCOPY,INSERT URETERAL STENT      HEMORRHOIDECTOMY         Facility-Administered Medications Prior to Admission   Medication Dose Route Frequency Provider Last Rate Last Admin    gemcitabine (Gemzar) 2 g/100mL in sodium chloride 0.9% bladder instillation  2 g Bladder Instillation Once Lynda Madrid MD         Medications Prior to Admission   Medication Sig Dispense Refill Last Dose    CLONAZEPAM 0.5 MG Oral Tab TAKE 1 TABLET(0.5 MG) BY MOUTH DAILY AS NEEDED 30 tablet 1 3/29/2024    sertraline 50 MG Oral Tab Take 1.5 tablets (75 mg total)  by mouth daily. (Patient taking differently: Take 1.5 tablets (75 mg total) by mouth at bedtime.) 135 tablet 3 2024 at 1900    rosuvastatin (CRESTOR) 20 MG Oral Tab Take 1 tablet (20 mg total) by mouth nightly. 30 tablet 11 2024 at 1900    cyanocobalamin 100 MCG Oral Tab Take 1 tablet (100 mcg total) by mouth daily.   2024 at 0800    Cholecalciferol 25 MCG (1000 UT) Oral Tab Take by mouth daily.   2024    sennosides 17.2 MG Oral Tab Take 1 tablet (17.2 mg total) by mouth nightly. 30 tablet 0      Current Facility-Administered Medications Ordered in Epic   Medication Dose Route Frequency Provider Last Rate Last Admin    lactated ringers infusion   Intravenous Continuous Lynda Madrid MD 20 mL/hr at 24 1021 New Bag at 24 1021    ceFAZolin (Ancef) 2 g in 20mL IV syringe premix  2 g Intravenous Once Lynda Madrid MD         No current [facility]-ordered outpatient medications on file.       No Known Allergies    Family History   Problem Relation Age of Onset    Prostate Cancer Father 68         metastatic dz    Cancer Mother         breast, thyroid    Heart Disorder Mother     Genetic Disease Daughter         retinitis pigmentosa    Alcohol and Other Disorders Associated Son         autistic    Cancer Maternal Grandmother         leukemia    Cancer Maternal Grandfather         unknown type; smoker    Cancer Sister         lung ca; smoker    Cancer Paternal Cousin Male         unknown type; smoker    Breast Cancer Niece 42     Social History     Socioeconomic History    Marital status:    Tobacco Use    Smoking status: Former     Packs/day: 1.00     Years: 10.00     Additional pack years: 0.00     Total pack years: 10.00     Types: Cigarettes     Quit date: 3/1/1990     Years since quittin.1    Smokeless tobacco: Never   Vaping Use    Vaping Use: Never used   Substance and Sexual Activity    Alcohol use: Yes     Alcohol/week: 3.0 standard drinks of alcohol     Types: 3 Standard  drinks or equivalent per week    Drug use: Yes     Types: Cannabis     Comment: daily       Available pre-op labs reviewed.  Lab Results   Component Value Date    WBC 10.9 03/26/2024    RBC 5.02 03/26/2024    HGB 14.9 03/26/2024    HCT 42.3 03/26/2024    MCV 84.3 03/26/2024    MCH 29.7 03/26/2024    MCHC 35.2 03/26/2024    RDW 13.3 03/26/2024    .0 03/26/2024     Lab Results   Component Value Date     03/26/2024    K 4.1 03/26/2024     03/26/2024    CO2 27.0 03/26/2024    BUN 14 03/26/2024    CREATSERUM 0.79 03/26/2024     (H) 03/26/2024    CA 9.7 03/26/2024          Vital Signs:  Body mass index is 27.25 kg/m².   height is 1.702 m (5' 7\") and weight is 78.9 kg (174 lb). His oral temperature is 98 °F (36.7 °C). His blood pressure is 125/81 and his pulse is 57. His respiration is 16 and oxygen saturation is 97%.   Vitals:    03/28/24 1650 04/03/24 1018   BP:  125/81   Pulse:  57   Resp:  16   Temp:  98 °F (36.7 °C)   TempSrc:  Oral   SpO2:  97%   Weight: 78.9 kg (174 lb) 78.9 kg (174 lb)   Height: 1.702 m (5' 7\") 1.702 m (5' 7\")        Anesthesia Evaluation      Airway   Mallampati: I  TM distance: >3 FB  Neck ROM: full  Dental      Pulmonary - normal exam   Cardiovascular - normal exam    Neuro/Psych    (+)  neuromuscular disease, anxiety/panic attacks,        GI/Hepatic/Renal      Endo/Other    Abdominal  - normal exam                 Anesthesia Plan:   ASA:  2  Plan:   General  Airway:  ETT and LMA  Informed Consent Plan and Risks Discussed With:  Patient      I have informed Torey Schroeder and/or legal guardian or family member of the nature of the anesthetic plan, benefits, risks including possible dental damage if relevant, major complications, and any alternative forms of anesthetic management.   All of the patient's questions were answered to the best of my ability. The patient desires the anesthetic management as planned.  CHANTELL RAMIREZ MD  4/3/2024 10:45 AM  Present on  Admission:  **None**

## 2024-04-03 NOTE — OPERATIVE REPORT
[facility]  part of [facility]    Operative Note     Torey Schroeder Location: OR   CSN [id number] MRN [id number]   Admission Date 4/3/2024 Operation Date 4/3/2024   Attending Physician Lynda Madrid MD Operating Physician Lynda Madrid MD      Preoperative Diagnosis: Malignant neoplasm of urinary bladder, unspecified site (HCC) [C67.9]     Postoperative Diagnosis: Malignant neoplasm of urinary bladder, history of low-grade noninvasive bladder cancer diagnosed previously at [facility].     Procedure Performed:   Cystoscopy, biopsy, fulguration, instillation of gemcitabine intravesically.     Primary Surgeon: Lynda Madrid MD      Assistant: None     Surgical Findings: Grossly unremarkable urethra and prostatic urethra.  1 cm papillary mass bladder floor corresponding with cystoscopy findings.  The area was biopsied excisionally and then fulgurated with the Bugbee electrode.     Anesthesia: General     Complications: None     Implants: * No implants in log *     Specimen: Bladder tumor x 3 samples submitted for pathology     Drains: 16 Bhutanese Parker catheter     Condition: Stable     Estimated Blood Loss: No data recorded     Summary of Case: After consent was obtained and preoperative antibiotics administered the patient was brought into the operating room.  Anesthesia was administered and he was placed in the dorsolithotomy position.  The groin was prepped and draped in the usual sterile standard fashion.  22 Bhutanese rigid cystoscope was placed per urethra.  Findings are reported in the findings section of this report.  A flexible biopsy forcep was inserted through the cystoscope.  I took 3 swipes or sections of this lesion effectively removing it.  The last section I tried to get some deeper tissues using the biopsy forcep.  All visible tumor was then removed.  I then cauterized the area with the Bugbee electrode.  No additional tumors could be seen on careful inspection  of the lining of the bladder.  The cystoscope was removed after the bladder was emptied.  I placed a 16 German Parker catheter and was instilled 2 g of gemcitabine solution mixed in 200 mL of fluid.  The catheter was Plugged.  The patient was placed in the supine position awakened extubated and taken out to the postanesthesia care unit stable condition.  I was present and performed the entirety of this procedure.  There were no perioperative or immediate postop complications.     Lynda Madrid MD  4/3/2024  12:47 PM

## 2024-04-03 NOTE — INTERVAL H&P NOTE
Pre-op Diagnosis: Malignant neoplasm of urinary bladder, unspecified site (HCC) [C67.9]    The above referenced H&P was reviewed by Lynda Madrid MD on 4/3/2024, the patient was examined and no significant changes have occurred in the patient's condition since the H&P was performed.  I discussed with the patient and/or legal representative the potential benefits, risks and side effects of this procedure; the likelihood of the patient achieving goals; and potential problems that might occur during recuperation.  I discussed reasonable alternatives to the procedure, including risks, benefits and side effects related to the alternatives and risks related to not receiving this procedure.  We will proceed with procedure as planned.

## 2024-04-03 NOTE — H&P
Torey Schroeder is a 68 year old male.     HPI:   No chief complaint on file.        68-year-old male with a history of low-grade noninvasive bladder cancer diagnosed originally on TURBT at [facility] May 2022.  Previously under the care of Dr. Sanford.  Had a surveillance cystoscopy 2023 showing a questionable mass in the bladder.  At the time of his procedure in January of this year a lesion was reportedly not detected.  He wanted to transfer his urologic care to [place].  No gross hematuria or dysuria.  PSA 2023 2.15.        HISTORY:       Past Medical History:   Diagnosis Date    Anxiety state      Cancer (HCC) 2022     bladder    Closed compression fracture of third lumbar vertebra (HCC)      External hemorrhoids      Hearing impairment              Past Surgical History:   Procedure Laterality Date    COLONOSCOPY        CYSTOSCOPY,INSERT URETERAL STENT        HEMORRHOIDECTOMY                Family History   Problem Relation Age of Onset    Prostate Cancer Father 68          metastatic dz    Genetic Disease Daughter           retinitis pigmentosa    Alcohol and Other Disorders Associated Son           autistic    Cancer Maternal Grandmother           leukemia    Cancer Maternal Grandfather           unknown type; smoker    Cancer Sister           lung ca; smoker    Breast Cancer Niece 42    Cancer Paternal Cousin Male           unknown type; smoker      Social History:   Social History            Socioeconomic History    Marital status:    Tobacco Use    Smoking status: Former       Packs/day: 1.00       Years: 10.00       Additional pack years: 0.00       Total pack years: 10.00       Types: Cigarettes       Quit date: 3/1/1990       Years since quittin.0    Smokeless tobacco: Never   Vaping Use    Vaping Use: Never used   Substance and Sexual Activity    Alcohol use: Yes       Alcohol/week: 3.0 standard drinks of alcohol       Types: 3 Standard drinks  or equivalent per week    Drug use: Yes       Types: Cannabis       Comment: daily         Medications (Active prior to today's visit):         Current Outpatient Medications   Medication Sig Dispense Refill    CLONAZEPAM 0.5 MG Oral Tab TAKE 1 TABLET(0.5 MG) BY MOUTH DAILY AS NEEDED 30 tablet 1    sertraline 50 MG Oral Tab Take 1.5 tablets (75 mg total) by mouth daily. 135 tablet 3    rosuvastatin (CRESTOR) 20 MG Oral Tab Take 1 tablet (20 mg total) by mouth nightly. 30 tablet 11    sennosides 17.2 MG Oral Tab Take 1 tablet (17.2 mg total) by mouth nightly. 30 tablet 0    cyanocobalamin 100 MCG Oral Tab Take by mouth As Directed.        Cholecalciferol 25 MCG (1000 UT) Oral Tab Take by mouth daily.             Allergies:  No Known Allergies        ROS:   Reviewed and otherwise normal     PHYSICAL EXAM:   Torey Schroeder  : 1955  Referring Physician: No ref. provider found      No chief complaint on file.              CYSTOSCOPY     Anesthesia:  2% lidocaine gel     Urethra: Normal  Prostate / Pelvic: Normal  Bladder: Abnormal 1 cm papillary tumor along the bladder floor left of midline proximal to the trigone .  No  stone, diverticulum, or glomerulation  U.O's: Normal  Trabeculation: grade 2        POST CYSTOSCOPY MEDICATIONS: sample one tablet Cipro 500mg given to patient     DIAGNOSIS:      PLAN: see below  No apparent distress  Intact neurologically grossly  Abdomen soft nontender nondistended  Phallus and testicle exams unremarkable     ASSESSMENT/PLAN:      Assessment       Encounter Diagnosis   Name Primary?    Malignant neoplasm of urinary bladder, unspecified site (HCC) Yes         Reviewed findings at length with patient.  Recommended cystoscopy under anesthesia and TURBT.  Intravesical gemcitabine instillation will be performed at that time.  Risks and side effects reviewed with patient.  He will be scheduled accordingly.              Orders This Visit:  No orders of the defined types were placed in  this encounter.        Meds This Visit:  Requested Prescriptions        No prescriptions requested or ordered in this encounter         Imaging & Referrals:  None      3/18/2024  Lynda Madrid MD

## 2024-04-03 NOTE — ANESTHESIA PROCEDURE NOTES
Airway  Date/Time: 4/3/2024 12:19 PM  Urgency: Elective      General Information and Staff    Patient location during procedure: OR  Anesthesiologist: Lowell Garcia MD  Resident/CRNA: Bridgett Aguilar CRNA  Performed: CRNA   Performed by: Bridgett Aguilar CRNA  Authorized by: Lowell Garcia MD      Indications and Patient Condition  Indications for airway management: anesthesia  Sedation level: deep  Preoxygenated: yes  Patient position: sniffing  Mask difficulty assessment: 0 - not attempted    Final Airway Details  Final airway type: supraglottic airway      Successful airway: classic  Size 4       Number of attempts at approach: 1    Additional Comments  Easy, atrauamtic placement of LMA. Dentition, lips and mucosa unchanged.

## 2024-04-04 ENCOUNTER — TELEPHONE (OUTPATIENT)
Dept: SURGERY | Facility: CLINIC | Age: 69
End: 2024-04-04

## 2024-04-04 NOTE — TELEPHONE ENCOUNTER
Per pt had surgery yesterday, needs 2 week post op, no openings found.  Please advise thank you.

## 2024-04-08 ENCOUNTER — TELEPHONE (OUTPATIENT)
Dept: SURGERY | Facility: CLINIC | Age: 69
End: 2024-04-08

## 2024-04-08 DIAGNOSIS — C67.9 MALIGNANT NEOPLASM OF URINARY BLADDER, UNSPECIFIED SITE (HCC): Primary | ICD-10-CM

## 2024-04-08 NOTE — TELEPHONE ENCOUNTER
Urology staff,  I called the patient.  I reviewed path results. This shows high grade UCC non-invasive cancer. He is aware.  Recommended:  - BCG installation x 6 (okay to split dose if he needs to be sure) to start 4 weeks from now.  I told the patient this will likely be done at the infusion center and he is aware.  - He is to be scheduled for a surveillance office cystoscopy in around 3 months.

## 2024-04-10 NOTE — TELEPHONE ENCOUNTER
- No PA needed, as pt has Medicare Part B with [facility] A[employer] medicare supplement  - pt to be scheduled for BCG x 6 weeks starting week of 5/6/24.  Order faxed to cancer center, and this message routed to charge nurse pool.

## 2024-04-10 NOTE — TELEPHONE ENCOUNTER
Spoke with patient, assisted in scheduling cystoscopy.     I advised him that the cancer center will reach out to patient to assist in scheduling bcg treatments. PT confirmed and verbalized understanding.

## 2024-04-11 RX ORDER — LIDOCAINE HYDROCHLORIDE 20 MG/ML
10 JELLY TOPICAL ONCE
Status: CANCELLED
Start: 2024-05-06 | End: 2024-05-06

## 2024-04-24 ENCOUNTER — PATIENT MESSAGE (OUTPATIENT)
Dept: INTERNAL MEDICINE CLINIC | Facility: CLINIC | Age: 69
End: 2024-04-24

## 2024-04-24 ENCOUNTER — OFFICE VISIT (OUTPATIENT)
Dept: INTERNAL MEDICINE CLINIC | Facility: CLINIC | Age: 69
End: 2024-04-24

## 2024-04-24 ENCOUNTER — TELEPHONE (OUTPATIENT)
Dept: INTERNAL MEDICINE CLINIC | Facility: CLINIC | Age: 69
End: 2024-04-24

## 2024-04-24 ENCOUNTER — HOSPITAL ENCOUNTER (OUTPATIENT)
Dept: GENERAL RADIOLOGY | Facility: HOSPITAL | Age: 69
Discharge: HOME | End: 2024-04-24
Attending: INTERNAL MEDICINE
Payer: MEDICARE

## 2024-04-24 VITALS
HEART RATE: 66 BPM | HEIGHT: 67 IN | WEIGHT: 176 LBS | SYSTOLIC BLOOD PRESSURE: 106 MMHG | BODY MASS INDEX: 27.62 KG/M2 | OXYGEN SATURATION: 96 % | DIASTOLIC BLOOD PRESSURE: 60 MMHG

## 2024-04-24 DIAGNOSIS — S89.92XA INJURY OF LEFT KNEE, INITIAL ENCOUNTER: Primary | ICD-10-CM

## 2024-04-24 DIAGNOSIS — M25.562 ACUTE PAIN OF LEFT KNEE: Primary | ICD-10-CM

## 2024-04-24 DIAGNOSIS — S89.92XA INJURY OF LEFT KNEE, INITIAL ENCOUNTER: ICD-10-CM

## 2024-04-24 PROCEDURE — 73564 X-RAY EXAM KNEE 4 OR MORE: CPT | Performed by: INTERNAL MEDICINE

## 2024-04-24 PROCEDURE — 99214 OFFICE O/P EST MOD 30 MIN: CPT | Performed by: INTERNAL MEDICINE

## 2024-04-24 PROCEDURE — 96127 BRIEF EMOTIONAL/BEHAV ASSMT: CPT | Performed by: INTERNAL MEDICINE

## 2024-04-24 NOTE — PROGRESS NOTES
Torey Schroeder is a 68 year old male  Chief Complaint   Patient presents with    Checkup     Left Knee Pain that came on suddenly during today's walk \"typically walk about 15-18k steps daily\". No previous injury or surgery.      HPI:   Torey Schroeder is a 68 year old male who presents for L knee pain.    Accompanied by his wife.    Walks extensively, >10K+/day.    Today while walking about 8K steps in he suddenly could not put pressure on his L leg, felt instability of L knee. Denies known injury/trauma, pivoting motion, denies hearing snap, pop, or clicking sounds.    Denies previous hx of knee pain.    Since the onset of sx, he took a few tylenol and admits to significant improvement in pain. He is walking with the assistance of a walker for stability.    Wt Readings from Last 6 Encounters:   04/24/24 176 lb (79.8 kg)   04/03/24 174 lb (78.9 kg)   03/05/24 175 lb (79.4 kg)   12/18/23 174 lb (78.9 kg)   07/28/23 172 lb 3.2 oz (78.1 kg)   04/03/23 175 lb 8 oz (79.6 kg)     Body mass index is 27.57 kg/m².     Current Outpatient Medications   Medication Sig Dispense Refill    BCG live 50 MG Intravesical Recon Susp 1 mL (50 mg total) by Other route once a week. May give BCG 25mg if able to pair with another pt 6 mL 0    CLONAZEPAM 0.5 MG Oral Tab TAKE 1 TABLET(0.5 MG) BY MOUTH DAILY AS NEEDED 30 tablet 1    sertraline 50 MG Oral Tab Take 1.5 tablets (75 mg total) by mouth daily. (Patient taking differently: Take 1.5 tablets (75 mg total) by mouth at bedtime.) 135 tablet 3    rosuvastatin (CRESTOR) 20 MG Oral Tab Take 1 tablet (20 mg total) by mouth nightly. 30 tablet 11    sennosides 17.2 MG Oral Tab Take 1 tablet (17.2 mg total) by mouth nightly. 30 tablet 0    cyanocobalamin 100 MCG Oral Tab Take 1 tablet (100 mcg total) by mouth daily.      Cholecalciferol 25 MCG (1000 UT) Oral Tab Take by mouth daily.        Past Medical History:    Anxiety state    Back problem    Cancer (HCC)    bladder    Closed compression  fracture of third lumbar vertebra (HCC)    External hemorrhoids    Hearing impairment    High cholesterol    Osteoarthritis      Past Surgical History:   Procedure Laterality Date    Colonoscopy      Cystoscopy,insert ureteral stent      Hemorrhoidectomy        Family History   Problem Relation Age of Onset    Prostate Cancer Father 68         metastatic dz    Cancer Mother         breast, thyroid    Heart Disorder Mother     Genetic Disease Daughter         retinitis pigmentosa    Alcohol and Other Disorders Associated Son         autistic    Cancer Maternal Grandmother         leukemia    Cancer Maternal Grandfather         unknown type; smoker    Cancer Sister         lung ca; smoker    Cancer Paternal Cousin Male         unknown type; smoker    Breast Cancer Niece 42      Social History:  Social History     Socioeconomic History    Marital status:    Tobacco Use    Smoking status: Former     Current packs/day: 0.00     Average packs/day: 1 pack/day for 10.0 years (10.0 ttl pk-yrs)     Types: Cigarettes     Start date: 3/1/1980     Quit date: 3/1/1990     Years since quittin.1    Smokeless tobacco: Never   Vaping Use    Vaping status: Never Used   Substance and Sexual Activity    Alcohol use: Yes     Alcohol/week: 3.0 standard drinks of alcohol     Types: 3 Standard drinks or equivalent per week    Drug use: Yes     Types: Cannabis     Comment: daily         REVIEW OF SYSTEMS:   GENERAL: denies f/c  MSK: + L knee pain    EXAM:   /60   Pulse 66   Ht 5' 7\" (1.702 m)   Wt 176 lb (79.8 kg)   SpO2 96%   BMI 27.57 kg/m²     GENERAL: well developed, well nourished, in no apparent distress  MSK: RLE: strength 5/5, full active ROM, mild ttp along medial joint line, negative anterior/posterior drawer, no significant erythema/warmth/edema, effusion, crepitus, + medial knee pain with valgus force, no pain with varus force  NEURO: A&O x 3, moves all 4 extremities normally      ASSESSMENT AND PLAN:    Torey Schroeder is a 68 year old male who presents for L knee pain.    L knee pain  - no erythema, warmth, edema to joint, possibly meniscal/ligamentous tear vs arthritis, less likely gout/infectious/inflammatory process given acute nature while walking  - advised rest, ice, elevation, tylenol as directed PRN for pain, NSAIDs if no relief  - ortho evaluation if no continued improvement  - XR KNEE, COMPLETE (4 OR MORE VIEWS), LEFT (CPT=73564); Future    RTC as previously scheduled with PCP or sooner PRN.    For E/M code - 30 minutes spent reviewing performing chart review, obtaining a history, performing a physical exam, reviewing the assessment/plan, placing orders, and completing documentation.     Magda Wynn DO  4/24/2024  5:00 PM

## 2024-04-25 NOTE — TELEPHONE ENCOUNTER
From: Torey Schroeder  To: Magda Wynn  Sent: 4/24/2024 7:16 PM CDT  Subject: Results    Hi Dr. Wynn, my results are confusing, in the findings there is NO acute fracture or dislocation. Yet in conclusion, there is the word NO missing. Which is it?  Can you refer an orthopedic

## 2024-04-26 ENCOUNTER — OFFICE VISIT (OUTPATIENT)
Dept: ORTHOPEDICS CLINIC | Facility: CLINIC | Age: 69
End: 2024-04-26
Payer: MEDICARE

## 2024-04-26 DIAGNOSIS — M23.204 DEGENERATIVE TEAR OF MEDIAL MENISCUS OF LEFT KNEE: Primary | ICD-10-CM

## 2024-04-26 PROCEDURE — 99204 OFFICE O/P NEW MOD 45 MIN: CPT | Performed by: STUDENT IN AN ORGANIZED HEALTH CARE EDUCATION/TRAINING PROGRAM

## 2024-04-26 RX ORDER — MELOXICAM 15 MG/1
15 TABLET ORAL DAILY
Qty: 30 TABLET | Refills: 0 | Status: SHIPPED | OUTPATIENT
Start: 2024-04-26

## 2024-04-26 NOTE — TELEPHONE ENCOUNTER
Yes, but given weekend coming up would consider [facility] immediate care locations for possible expedited care.    Thank you!

## 2024-04-28 PROBLEM — M23.204 DEGENERATIVE TEAR OF MEDIAL MENISCUS OF LEFT KNEE: Status: ACTIVE | Noted: 2024-04-28

## 2024-04-28 NOTE — PROGRESS NOTES
Orthopaedic Surgery New Patient Visit  _____________________________________________________________________________________________________  _____________________________________________________________________________________________________    DATE OF VISIT: 2024     CHIEF COMPLAINT:   Chief Complaint   Patient presents with    Knee Pain     Pt in for left knee pain,pain started 2 days while walking- not able to walk, pain /10- takes tylenol for pain, has xray in system        HISTORY OF PRESENT ILLNESS: Torey Schroeder is a 68 year old male who presents to the clinic for evaluation of left knee.  He reports that he was walking approximately 2 days ago when he had sudden onset of severe pain primarily on the medial aspect of his knee.  He denies any pops.  He denies any twisting injuries to the knee or any other trauma to the knee.  He denies pain prior to this event.  He reports that in the past few days he has had substantial difficulty doing any walking.  He normally walks several miles a day.  He denies neurologic symptoms.  He has not yet done physical therapy.  He denies mechanical symptoms at this time or any kaylie instability of the knee.    SOCIAL HISTORY  Social History     Socioeconomic History    Marital status:      Spouse name: Not on file    Number of children: Not on file    Years of education: Not on file    Highest education level: Not on file   Occupational History    Not on file   Tobacco Use    Smoking status: Former     Current packs/day: 0.00     Average packs/day: 1 pack/day for 10.0 years (10.0 ttl pk-yrs)     Types: Cigarettes     Start date: 3/1/1980     Quit date: 3/1/1990     Years since quittin.1    Smokeless tobacco: Never   Vaping Use    Vaping status: Never Used   Substance and Sexual Activity    Alcohol use: Yes     Alcohol/week: 3.0 standard drinks of alcohol     Types: 3 Standard drinks or equivalent per week    Drug use: Yes     Types: Cannabis     Comment:  daily    Sexual activity: Not on file   Other Topics Concern    Not on file   Social History Narrative    Not on file     Social Determinants of Health     Financial Resource Strain: Not on file   Food Insecurity: Not on file   Transportation Needs: Not on file   Physical Activity: Not on file   Stress: Not on file   Social Connections: Not on file   Housing Stability: Not on file        PAST MEDICAL HISTORY  Past Medical History:    Anxiety state    Back problem    Cancer (HCC)    bladder    Closed compression fracture of third lumbar vertebra (HCC)    External hemorrhoids    Hearing impairment    High cholesterol    Osteoarthritis        PAST SURGICAL HISTORY  Past Surgical History:   Procedure Laterality Date    Colonoscopy      Cystoscopy,insert ureteral stent      Hemorrhoidectomy          MEDICATIONS  * Reviewed   Meloxicam 15 MG Oral Tab Take 1 tablet (15 mg total) by mouth daily. 30 tablet 0    BCG live 50 MG Intravesical Recon Susp 1 mL (50 mg total) by Other route once a week. May give BCG 25mg if able to pair with another pt 6 mL 0    CLONAZEPAM 0.5 MG Oral Tab TAKE 1 TABLET(0.5 MG) BY MOUTH DAILY AS NEEDED 30 tablet 1    sertraline 50 MG Oral Tab Take 1.5 tablets (75 mg total) by mouth daily. (Patient taking differently: Take 1.5 tablets (75 mg total) by mouth at bedtime.) 135 tablet 3    rosuvastatin (CRESTOR) 20 MG Oral Tab Take 1 tablet (20 mg total) by mouth nightly. 30 tablet 11    sennosides 17.2 MG Oral Tab Take 1 tablet (17.2 mg total) by mouth nightly. 30 tablet 0    cyanocobalamin 100 MCG Oral Tab Take 1 tablet (100 mcg total) by mouth daily.      Cholecalciferol 25 MCG (1000 UT) Oral Tab Take by mouth daily.        gemcitabine (Gemzar) 2 g/100mL in sodium chloride 0.9% bladder instillation  2 g Bladder Instillation Once Lynda Madrid MD            ALLERGIES  No Known Allergies     FAMILY HISTORY  Family History   Problem Relation Age of Onset    Prostate Cancer Father 68          metastatic dz    Cancer Mother         breast, thyroid    Heart Disorder Mother     Genetic Disease Daughter         retinitis pigmentosa    Alcohol and Other Disorders Associated Son         autistic    Cancer Maternal Grandmother         leukemia    Cancer Maternal Grandfather         unknown type; smoker    Cancer Sister         lung ca; smoker    Cancer Paternal Cousin Male         unknown type; smoker    Breast Cancer Niece 42        REVIEW OF SYSTEMS  A 14 point review of systems was performed. Pertinent positives and negatives noted in the HPI.    PHYSICAL EXAM  There were no vitals taken for this visit.     Constitutional: The patient is well-developed, well-nourished, in no acute distress.  Neurological: Alert and oriented to person, place, and time.  Psychiatric: Mood and affect normal.  Head: Normocephalic and atraumatic.  Cardiovascular: regular rate by palpation  Pulmonary/Chest: Effort normal. No respiratory distress. Breathing non-labored  Abdominal: Abdomen exhibits no distension.   Left  KNEE  INSPECTION/PALPATION  No readily apparent visual abnormalities of the knee.   No ecchymosis or erythema  Mild effusion.   No breaks in skin. Skin is euthermic.  Neutral alignment on standing  Slightly antalgic gait  TTP  to medial joint line, NTTP to lateral joint line  ROM  0-130 degrees  KNEE STRENGTH  Flexion: 5/5  Extension: 5/5  STABILITY  1A lachman, stable anterior drawer  Stable posterior drawer  Stable to varus and valgus stress at 0 and 30 degrees  1 quadrant of lateral patellar translation  Negative J sign, negative apprehension  PERTINENT FINDINGS  Positive Rocky    Positive Thessaly  Negative Patellar grind  SILT Kari/Saph/SPN/DPN/T  2+ DP/PT pulse, foot wwp     RESULTS    Lab Results   Component Value Date    WBC 10.9 03/26/2024    HGB 14.9 03/26/2024    .0 03/26/2024      Lab Results   Component Value Date     (H) 03/26/2024    BUN 14 03/26/2024    CREATSERUM 0.79 03/26/2024     GFRNAA 94 04/11/2022    GFRAA 108 04/11/2022        IMAGING  I independently viewed and interpreted the imaging. Radiologist interpretation is available in the imaging report.  X-ray: Plain films of the left knee knee including AP, lateral, and sunrise weightbearing views were reviewed. These demonstrate no acute osseous abnormalities.  The patella is centrally positioned within the femoral trochlea.  There are mild degenerative changes in the patellofemoral joint.  There are mild degenerative changes in the medial knee compartment and minimal to no degenerative changes in the lateral knee compartment of the tibiofemoral joint.  There are no  loose bodies evident.  There is no evidence of Segond fracture or other fractures.     XR KNEE, COMPLETE (4 OR MORE VIEWS), LEFT (CPT=73564)    Result Date: 4/24/2024  PROCEDURE: XR KNEE, COMPLETE (4 OR MORE VIEWS), LEFT (CPT=73564)  COMPARISON: None.  INDICATIONS: Left medial knee pain today. No known injury.  TECHNIQUE: 4 views were obtained.   FINDINGS:  BONES: No acute appearing fracture or dislocation.  Mild narrowing of the medial joint space and patellofemoral joint.  Well-preserved lateral joint space. SOFT TISSUES: Mild vascular calcification seen at the posterior portion of the distal left thigh. EFFUSION: None visible. OTHER: Negative.         CONCLUSION:  1. Acute appearing fracture or dislocation.  Mild degenerative narrowing of the medial joint space and patellofemoral joint.    Dictated by (CST): Larry Montgomery MD on 4/24/2024 at 6:01 PM     Finalized by (CST): Larry Montgomery MD on 4/24/2024 at 6:02 PM             ASSESSMENT/PLAN: Torey Schroeder is a 68 year old male who presents to the Orthopaedic surgery clinic today for left knee pain which appears to be secondary to a degenerative tear of the medial meniscus, which may have occurred while walking 2 days ago.  He does have some baseline arthritis in the knee which likely precipitated this degeneration in the  meniscus.  We discussed the diagnosis and possible intervention.  Based on his current symptoms, he would like to proceed with therapy and anti-inflammatories.  We discussed that should these fail to improve, we can obtain advanced imaging, but that based on his degeneration in the knee it is unlikely to reveal anything that would .  In approximately 6 to 8 weeks, he will follow-up if pain persists.     Discussed the history, physical exam, treatment to date, and reviewed relevant imaging an studies with the patient.  WEIGHT BEARING STATUS: Weightbearing as tolerated  RANGE-OF-MOTION LIMITATIONS: as tolerated  NEW PRESCRIPTIONS:  We discussed medications for this condition including patient current regimen. Based on this discussion we have added/re-ordered meloxicam  IMAGING ORDERED: none  CONSULTS PLACED: We discussed the role of therapy and/or additional specialty evaluation/intervention for this condition including previous/ongoing therapy and specialist services. Based on this discussion we have consulted physical therapy  PROSTHESES/ORTHOTICS: the patient does not need prostheses/orthoses for the current condition  PROCEDURES: none    FOLLOW-UP: after trial of therapy    RADIOGRAPHS AT NEXT VISIT: none    I have personally seen Torey Schroeder and discussed in detail their plan of care. Prior to departure, they indicated agreement with and understanding of their plan of care and their follow-up as documented herein this note. Please note that this note was written in combination with voice recognition/dictation software and there is a possibility of transcription errors which were not identified at the time of note submission. If clarification is necessary, please contact the author or clinic staff.    Fady Arteaga MD  Orthopaedic Surgery  4/28/2024

## 2024-04-28 NOTE — PATIENT INSTRUCTIONS
Dear Torey Schroeder ,    It was a pleasure seeing you today. I saw you for your left knee meniscus tear. Today we discussed the problem, the natural history of this process, management options including conservative management, therapy, pain management including activity modification, icing, elevation, and medications, and anticipated follow-up plans.    I ordered the following for you:     - Referrals: Physical therapy  - Medications: Meloxicam    Please don't hesitate to reach out if you have any questions or concerns. We will plan to follow-up next after a trial of therapy.     Sincerely,  Dr. Fady Arteaga

## 2024-05-07 ENCOUNTER — LAB ENCOUNTER (OUTPATIENT)
Dept: LAB | Age: 69
End: 2024-05-07
Attending: UROLOGY
Payer: MEDICARE

## 2024-05-07 DIAGNOSIS — C67.9 MALIGNANT NEOPLASM OF URINARY BLADDER, UNSPECIFIED SITE (HCC): ICD-10-CM

## 2024-05-07 LAB
BILIRUB UR QL: NEGATIVE
CLARITY UR: CLEAR
COLOR UR: YELLOW
GLUCOSE UR-MCNC: NORMAL MG/DL
HGB UR QL STRIP.AUTO: NEGATIVE
KETONES UR-MCNC: NEGATIVE MG/DL
LEUKOCYTE ESTERASE UR QL STRIP.AUTO: 25
NITRITE UR QL STRIP.AUTO: NEGATIVE
PH UR: 7 [PH] (ref 5–8)
PROT UR-MCNC: NEGATIVE MG/DL
SP GR UR STRIP: 1.01 (ref 1–1.03)
UROBILINOGEN UR STRIP-ACNC: NORMAL

## 2024-05-07 PROCEDURE — 87086 URINE CULTURE/COLONY COUNT: CPT

## 2024-05-07 PROCEDURE — 81001 URINALYSIS AUTO W/SCOPE: CPT

## 2024-05-08 ENCOUNTER — NURSE ONLY (OUTPATIENT)
Dept: HEMATOLOGY/ONCOLOGY | Facility: HOSPITAL | Age: 69
End: 2024-05-08
Attending: UROLOGY
Payer: MEDICARE

## 2024-05-08 VITALS
OXYGEN SATURATION: 96 % | HEART RATE: 76 BPM | DIASTOLIC BLOOD PRESSURE: 79 MMHG | WEIGHT: 169 LBS | SYSTOLIC BLOOD PRESSURE: 127 MMHG | RESPIRATION RATE: 16 BRPM | BODY MASS INDEX: 26 KG/M2 | TEMPERATURE: 98 F

## 2024-05-08 DIAGNOSIS — C67.9 MALIGNANT NEOPLASM OF URINARY BLADDER, UNSPECIFIED SITE (HCC): Primary | ICD-10-CM

## 2024-05-08 PROCEDURE — 51720 TREATMENT OF BLADDER LESION: CPT

## 2024-05-08 RX ORDER — LIDOCAINE HYDROCHLORIDE 20 MG/ML
10 JELLY TOPICAL ONCE
Status: COMPLETED | OUTPATIENT
Start: 2024-05-08 | End: 2024-05-08

## 2024-05-08 RX ORDER — LIDOCAINE HYDROCHLORIDE 20 MG/ML
10 JELLY TOPICAL ONCE
Start: 2024-05-15 | End: 2024-05-15

## 2024-05-08 RX ADMIN — LIDOCAINE HYDROCHLORIDE 10 ML: 20 JELLY TOPICAL at 14:22:00

## 2024-05-08 NOTE — PROGRESS NOTES
Pt arrived amb with spouse for BCG instillation.  Reviewed post-instillation instructions with pt and wife, both verbalized understanding - handout given.  BCG instilled per MD order.  Pt ivana well.  Pt left amb, verbalized he will not urinate for 2hrs.

## 2024-05-11 ENCOUNTER — PATIENT MESSAGE (OUTPATIENT)
Dept: SURGERY | Facility: CLINIC | Age: 69
End: 2024-05-11

## 2024-05-13 ENCOUNTER — LAB ENCOUNTER (OUTPATIENT)
Dept: LAB | Age: 69
End: 2024-05-13
Attending: UROLOGY

## 2024-05-13 ENCOUNTER — PATIENT MESSAGE (OUTPATIENT)
Dept: SURGERY | Facility: CLINIC | Age: 69
End: 2024-05-13

## 2024-05-13 ENCOUNTER — TELEPHONE (OUTPATIENT)
Dept: SURGERY | Facility: CLINIC | Age: 69
End: 2024-05-13

## 2024-05-13 DIAGNOSIS — C67.9 MALIGNANT NEOPLASM OF URINARY BLADDER, UNSPECIFIED SITE (HCC): ICD-10-CM

## 2024-05-13 LAB
BILIRUB UR QL: NEGATIVE
COLOR UR: YELLOW
GLUCOSE UR-MCNC: NORMAL MG/DL
KETONES UR-MCNC: NEGATIVE MG/DL
LEUKOCYTE ESTERASE UR QL STRIP.AUTO: 500
PH UR: 6.5 [PH] (ref 5–8)
PROT UR-MCNC: 50 MG/DL
RBC #/AREA URNS AUTO: >10 /HPF
SP GR UR STRIP: 1.02 (ref 1–1.03)
UROBILINOGEN UR STRIP-ACNC: NORMAL
WBC #/AREA URNS AUTO: >50 /HPF

## 2024-05-13 PROCEDURE — 87186 SC STD MICRODIL/AGAR DIL: CPT

## 2024-05-13 PROCEDURE — 81001 URINALYSIS AUTO W/SCOPE: CPT

## 2024-05-13 PROCEDURE — 87077 CULTURE AEROBIC IDENTIFY: CPT

## 2024-05-13 PROCEDURE — 87086 URINE CULTURE/COLONY COUNT: CPT

## 2024-05-13 NOTE — TELEPHONE ENCOUNTER
I s/w pt and determined that he had a severe reaction to his first BCG TX last wed. 5/8. He stated that he developed fever (highest up to 103 F), chills body aches, extreme fatigue, and severe frequency/urgency, and small blood in his urine. Today his temp is 97 F and he feels slightly better but still has body aches, fatigue  and frequency/urgency. I told pt that I will s/w EMMANUELLEB about this and see what he thinks about going forward with this weeks TX.     I s/w EMMANUELLEB to inform him and he asked if pt got a full 50 mg dose of BCG and if he got a full dose he can try this weeks TX with a half dose and premedicate with some Ibuprofen prior. If pt has the same response then another med may need to be used.     I called the cancer ctr at [facility] and s/w Iman and she informed that pt got a full dose and will have to look over there visits for this week to see if pt can pair with another pt. She asked that I send her a msg.

## 2024-05-13 NOTE — TELEPHONE ENCOUNTER
I called pt back to inform him of JUANA's response about his side effects he experienced from his first BCG 50 mg TX. Pt agrees  to try BCG 25 mg dose for his # 2 dose on 5/15 if there is another pt to pair with. He will also pre-medicate with Ibuprofen as suggested by Dr. Madrid. I reminded pt to complete his urine culture test today.

## 2024-05-14 ENCOUNTER — TELEPHONE (OUTPATIENT)
Dept: HEMATOLOGY/ONCOLOGY | Facility: HOSPITAL | Age: 69
End: 2024-05-14

## 2024-05-14 ENCOUNTER — TELEPHONE (OUTPATIENT)
Dept: SURGERY | Facility: CLINIC | Age: 69
End: 2024-05-14

## 2024-05-14 RX ORDER — SULFAMETHOXAZOLE AND TRIMETHOPRIM 800; 160 MG/1; MG/1
1 TABLET ORAL 2 TIMES DAILY
Qty: 14 TABLET | Refills: 0 | Status: SHIPPED | OUTPATIENT
Start: 2024-05-14 | End: 2024-05-21

## 2024-05-14 NOTE — TELEPHONE ENCOUNTER
UA and urine cx results reviewed by Dr. Madrid. No BCG tomorrow, will reschedule for next week due to abnormal UA/Cx results. Patient will be started on oral antibiotics by Dr. Madrid. Patient informed of this plan by Dr. Madrid's RN,, Maylin Cerda.

## 2024-05-14 NOTE — TELEPHONE ENCOUNTER
I called the pt and confirmed his full name and .  Pt had positive urine cultures, per Dr. Madrid pt needs to be cancelled this week but may resume next week. Antibiotics ordered. Pt asked about 25 mg vs 50 mg efficacy. I told the patient that Dr. Madrid had said that the patient should get 25 mg if he does not tolerate the higher dose.    I confirmed with the patient that I ordered Bactrim 800/160 mg and sent it to [facility] in [place].    Pt verbalized his understanding.

## 2024-05-14 NOTE — TELEPHONE ENCOUNTER
Patient informed of his next appointment for 5/22/24 for his 2nd dose of BCG. Added 6/19/24 to complete 6 doses of BCG ordered.

## 2024-05-15 ENCOUNTER — APPOINTMENT (OUTPATIENT)
Dept: HEMATOLOGY/ONCOLOGY | Facility: HOSPITAL | Age: 69
End: 2024-05-15
Attending: UROLOGY
Payer: MEDICARE

## 2024-05-15 NOTE — TELEPHONE ENCOUNTER
From: Torey cShroeder  To: Lynda Madrid  Sent: 5/11/2024 1:27 PM CDT  Subject: Hello, I had the big installation wednesday Thursday after noon I started   With cold like symptoms. Could not sleep Thursday night.  Been have a fluctuating temperature. 100-102.5  Just feeling terrible. Any idea how long this will last?  Will it be like this every instillation?

## 2024-05-16 ENCOUNTER — TELEPHONE (OUTPATIENT)
Dept: SURGERY | Facility: CLINIC | Age: 69
End: 2024-05-16

## 2024-05-16 ENCOUNTER — PATIENT MESSAGE (OUTPATIENT)
Dept: SURGERY | Facility: CLINIC | Age: 69
End: 2024-05-16

## 2024-05-16 RX ORDER — TAMSULOSIN HYDROCHLORIDE 0.4 MG/1
0.4 CAPSULE ORAL DAILY
Qty: 14 CAPSULE | Refills: 0 | Status: SHIPPED | OUTPATIENT
Start: 2024-05-16 | End: 2024-05-30

## 2024-05-16 RX ORDER — CIPROFLOXACIN 500 MG/1
500 TABLET, FILM COATED ORAL 2 TIMES DAILY
Qty: 20 TABLET | Refills: 0 | Status: SHIPPED | OUTPATIENT
Start: 2024-05-16 | End: 2024-05-26

## 2024-05-16 NOTE — PROGRESS NOTES
Message received from Urology office stating that Dr. Madrid has discontinued BCG treatment indefinitely for this patient.     Therapy plan discontinued.

## 2024-05-16 NOTE — TELEPHONE ENCOUNTER
- routed TE to [facility] Charge nurse pool to inform them of discontinuation of pt BCG tx's indefinitely.

## 2024-05-16 NOTE — TELEPHONE ENCOUNTER
-S/w pt; identity verified with name & .  -On 24, pt started BCG Intravesical tx.  As of 24 had flu symptoms & fluctuating fever ().    -He was scheduled for BCG on 5/15, however, CS indicates UTI & he was prescribed Bactrim DS.  -This a.m. he reports \"blood in my semen\" following intercourse.  -[facility] s/w pt & orders as follows:  1. Start Flomax 0.4mg dly X2 weeks.  2. Discontinue Bactrim.  Replace w/ Cipro 500mg tabX1, 2Xdly [id number].  3.  Cancel BCG weekly tx.  4. Schedule OV w/ KHB in 3weeks (24 @ 9:20am).  -Pt agreeable with above mentioned plan.  -Encounter complete.

## 2024-05-22 ENCOUNTER — APPOINTMENT (OUTPATIENT)
Dept: HEMATOLOGY/ONCOLOGY | Facility: HOSPITAL | Age: 69
End: 2024-05-22
Attending: UROLOGY
Payer: MEDICARE

## 2024-05-22 NOTE — TELEPHONE ENCOUNTER
From: Torey Schroeder  To: Lynda Madrid  Sent: 5/16/2024 7:06 AM CDT  Subject: Concern    Good Morning, I have to ask , is this normal?   I had sex for the first time in a week, after ejaculation into a condom, was blood along with seman.   Should I have blood here????  Concerning to me.  Torey  [phone]

## 2024-05-25 ENCOUNTER — PATIENT MESSAGE (OUTPATIENT)
Dept: SURGERY | Facility: CLINIC | Age: 69
End: 2024-05-25

## 2024-05-28 ENCOUNTER — TELEPHONE (OUTPATIENT)
Dept: SURGERY | Facility: CLINIC | Age: 69
End: 2024-05-28

## 2024-05-28 ENCOUNTER — OFFICE VISIT (OUTPATIENT)
Dept: SURGERY | Facility: CLINIC | Age: 69
End: 2024-05-28

## 2024-05-28 VITALS
HEIGHT: 67 IN | HEART RATE: 61 BPM | WEIGHT: 170 LBS | SYSTOLIC BLOOD PRESSURE: 115 MMHG | BODY MASS INDEX: 26.68 KG/M2 | DIASTOLIC BLOOD PRESSURE: 75 MMHG

## 2024-05-28 DIAGNOSIS — C67.9 MALIGNANT NEOPLASM OF URINARY BLADDER, UNSPECIFIED SITE (HCC): Primary | ICD-10-CM

## 2024-05-28 PROCEDURE — 99214 OFFICE O/P EST MOD 30 MIN: CPT | Performed by: UROLOGY

## 2024-05-28 RX ORDER — CIPROFLOXACIN 500 MG/1
500 TABLET, FILM COATED ORAL 2 TIMES DAILY
Qty: 20 TABLET | Refills: 0 | Status: SHIPPED | OUTPATIENT
Start: 2024-05-28

## 2024-05-28 NOTE — PROGRESS NOTES
Torey Schroeder is a 69 year old male.    HPI:     Chief Complaint   Patient presents with    Follow - Up     Patient is here for follow up, patient states to have right testicle pain as well, sensitive and discomfort. Patient is here with wife .    Patient states to not be the same after BCG Treatment.      69-year-old male with a history of high-grade noninvasive bladder cancer.  Originally diagnosed as low-grade noninvasive May 2022 at [facility].  He transferred his urologic care here.  Office cystoscopy demonstrated a suspicious lesion within the bladder.  Cystoscopy, biopsy, fulguration April 3, 2024 and gemcitabine bladder instillation showed high-grade noninvasive disease.  He was referred for BCG installations x 6.  Received his first instillation May 8, 2024.  Had significant lower urinary tract symptoms pain fevers up to 102.  The symptoms lasted for about 2 weeks and finally resolved about 4 5 days ago.  In the context of the symptoms, I did a repeat urine culture May 15, 2024 after initial urine culture May 7 was unremarkable.  The repeat urine culture showed E. coli and he was treated with 10 days of ciprofloxacin.  He states his symptoms drastically improved.  About 2 to 3 days ago he began complaining of right-sided testicular pain and swelling.  No fevers or chills at present.  Here today with his wife.  No gross hematuria or dysuria.        HISTORY:  Past Medical History:    Anxiety state    Back problem    Cancer (HCC)    bladder    Closed compression fracture of third lumbar vertebra (HCC)    External hemorrhoids    Hearing impairment    High cholesterol    Osteoarthritis      Past Surgical History:   Procedure Laterality Date    Colonoscopy      Cystoscopy,insert ureteral stent      Hemorrhoidectomy        Family History   Problem Relation Age of Onset    Prostate Cancer Father 68         metastatic dz    Cancer Mother         breast, thyroid    Heart Disorder Mother      Genetic Disease Daughter         retinitis pigmentosa    Alcohol and Other Disorders Associated Son         autistic    Cancer Maternal Grandmother         leukemia    Cancer Maternal Grandfather         unknown type; smoker    Cancer Sister         lung ca; smoker    Cancer Paternal Cousin Male         unknown type; smoker    Breast Cancer Niece 42      Social History:   Social History     Socioeconomic History    Marital status:    Tobacco Use    Smoking status: Former     Current packs/day: 0.00     Average packs/day: 1 pack/day for 10.0 years (10.0 ttl pk-yrs)     Types: Cigarettes     Start date: 3/1/1980     Quit date: 3/1/1990     Years since quittin.2    Smokeless tobacco: Never   Vaping Use    Vaping status: Never Used   Substance and Sexual Activity    Alcohol use: Yes     Alcohol/week: 3.0 standard drinks of alcohol     Types: 3 Standard drinks or equivalent per week    Drug use: Yes     Types: Cannabis     Comment: daily        Medications (Active prior to today's visit):  Current Outpatient Medications   Medication Sig Dispense Refill    ciprofloxacin 500 MG Oral Tab Take 1 tablet (500 mg total) by mouth 2 (two) times daily. 20 tablet 0    tamsulosin 0.4 MG Oral Cap Take 1 capsule (0.4 mg total) by mouth daily for 14 days. Take 1/2 hour following the same meal each day 14 capsule 0    Meloxicam 15 MG Oral Tab Take 1 tablet (15 mg total) by mouth daily. 30 tablet 0    BCG live 50 MG Intravesical Recon Susp 1 mL (50 mg total) by Other route once a week. May give BCG 25mg if able to pair with another pt 6 mL 0    CLONAZEPAM 0.5 MG Oral Tab TAKE 1 TABLET(0.5 MG) BY MOUTH DAILY AS NEEDED 30 tablet 1    sertraline 50 MG Oral Tab Take 1.5 tablets (75 mg total) by mouth daily. (Patient taking differently: Take 1.5 tablets (75 mg total) by mouth at bedtime.) 135 tablet 3    rosuvastatin (CRESTOR) 20 MG Oral Tab Take 1 tablet (20 mg total) by mouth nightly. 30 tablet 11    sennosides 17.2 MG Oral  Tab Take 1 tablet (17.2 mg total) by mouth nightly. 30 tablet 0    cyanocobalamin 100 MCG Oral Tab Take 1 tablet (100 mcg total) by mouth daily.      Cholecalciferol 25 MCG (1000 UT) Oral Tab Take by mouth daily.         Allergies:  No Known Allergies      ROS:       PHYSICAL EXAM:        ASSESSMENT/PLAN:   Assessment   Encounter Diagnosis   Name Primary?    Malignant neoplasm of urinary bladder, unspecified site (HCC) Yes       Reviewed findings at length with patient.  Recommended:  - We will forego any additional BCG treatment given the severity of his side effects.  I suggested a 6-week instillation of gemcitabine since he tolerated this well after his TURBT in early April.  Risks and side effects reviewed.  I suggested that we wait 3 to 4 weeks before starting this treatment.  - I will treat him with an additional 10 days of ciprofloxacin for presumed orchitis complicated by recent UTI.  If symptoms are not resolved after the course asked him to contact my office.  - We will plan on a repeat surveillance cystoscopy 6 weeks after completing his gemcitabine instillation through the cancer center.  My office will arrange this.  - Repeat upper tract imaging with CT urogram to be done after his next surveillance cystoscopy.  Of note, a CT urogram has been done by his primary care physician April 2022 at the time of his initial diagnosis showing a 1.2 cm high density focus in the dependent part of the urinary bladder adjacent to the ureterovesical junction on the right side.  There is a 2 to 3 mm nonobstructing right interpolar renal calculus as well.         Orders This Visit:  No orders of the defined types were placed in this encounter.      Meds This Visit:  Requested Prescriptions     Signed Prescriptions Disp Refills    ciprofloxacin 500 MG Oral Tab 20 tablet 0     Sig: Take 1 tablet (500 mg total) by mouth 2 (two) times daily.       Imaging & Referrals:  None     5/28/2024  Lynda Madrid MD

## 2024-05-28 NOTE — TELEPHONE ENCOUNTER
Dr. Madrid ordered Gemcitabine in 3-4 weeks for a total of 6 Installations. Dr. Madrid also ordered a Cystoscopy to be done in office 6 weeks after last Gemcitabine installation. Urine Culture with reflex was placed as a standing order, and is due 3 days prior to each installation. I scheduled patient for August 2024 for an office Cystoscopy.   Patient needs to be scheduled with Cancer Center for Gemcitabine.

## 2024-05-28 NOTE — TELEPHONE ENCOUNTER
- No PA needed, as pt has Medicare Part B with [facility] AAR medicare supplement  - pt to be scheduled for Gmcitabine x 6 weeks starting week of 6/18 or 6/25/24.

## 2024-05-28 NOTE — TELEPHONE ENCOUNTER
Patient Is schedule for Gemcitabine Treatment to start in 3-4 weeks. Patient may need a prior authorization for treatments.

## 2024-05-28 NOTE — TELEPHONE ENCOUNTER
I s/w pt and determined that he has been having Rt. Testicular swelling twice the size of his LT testicle and also the testicle is very sensitive to touch. He is insisting on being seen today. I offered him an appt today with JUANA at 110:45 am/

## 2024-05-29 ENCOUNTER — APPOINTMENT (OUTPATIENT)
Dept: HEMATOLOGY/ONCOLOGY | Facility: HOSPITAL | Age: 69
End: 2024-05-29
Attending: UROLOGY
Payer: MEDICARE

## 2024-05-31 RX ORDER — MELOXICAM 15 MG/1
15 TABLET ORAL DAILY
Qty: 30 TABLET | Refills: 0 | Status: SHIPPED | OUTPATIENT
Start: 2024-05-31

## 2024-06-04 ENCOUNTER — PATIENT MESSAGE (OUTPATIENT)
Dept: SURGERY | Facility: CLINIC | Age: 69
End: 2024-06-04

## 2024-06-05 ENCOUNTER — APPOINTMENT (OUTPATIENT)
Dept: HEMATOLOGY/ONCOLOGY | Facility: HOSPITAL | Age: 69
End: 2024-06-05
Attending: UROLOGY
Payer: MEDICARE

## 2024-06-05 ENCOUNTER — TELEPHONE (OUTPATIENT)
Dept: SURGERY | Facility: CLINIC | Age: 69
End: 2024-06-05

## 2024-06-05 NOTE — TELEPHONE ENCOUNTER
I checked with Dr. Madrid re. Upcoming cysto/BTS and follow up appointment. See Epic exchange below:    This patient has been changed from BCG to Gemcitabine. He still has an appt scheduled with your for this Friday, which was to discuss his reaction to BCG. I assume I can cancel this? I also need to reschedule his cysto/BTS after the Gemzar is done. 3-4 weeks after the last treatment ok?    Sorry, this was for Torey Schroeder    9 mins  KB  Lynda Madrid MD  Yes that is fine.    I called the patient and confirmed his full name and . In agreement with patient I canceled the old appointments and scheduled the patient for a cysto/BTS on 2024.

## 2024-06-12 ENCOUNTER — APPOINTMENT (OUTPATIENT)
Dept: HEMATOLOGY/ONCOLOGY | Facility: HOSPITAL | Age: 69
End: 2024-06-12
Attending: UROLOGY
Payer: MEDICARE

## 2024-06-19 ENCOUNTER — APPOINTMENT (OUTPATIENT)
Dept: HEMATOLOGY/ONCOLOGY | Facility: HOSPITAL | Age: 69
End: 2024-06-19
Attending: UROLOGY
Payer: MEDICARE

## 2024-06-24 ENCOUNTER — LAB ENCOUNTER (OUTPATIENT)
Dept: LAB | Facility: HOSPITAL | Age: 69
End: 2024-06-24
Attending: UROLOGY

## 2024-06-24 DIAGNOSIS — C67.9 MALIGNANT NEOPLASM OF URINARY BLADDER, UNSPECIFIED SITE (HCC): ICD-10-CM

## 2024-06-24 LAB
BILIRUB UR QL: NEGATIVE
CLARITY UR: CLEAR
GLUCOSE UR-MCNC: NORMAL MG/DL
HGB UR QL STRIP.AUTO: NEGATIVE
KETONES UR-MCNC: NEGATIVE MG/DL
LEUKOCYTE ESTERASE UR QL STRIP.AUTO: NEGATIVE
NITRITE UR QL STRIP.AUTO: NEGATIVE
PH UR: 7 [PH] (ref 5–8)
PROT UR-MCNC: NEGATIVE MG/DL
SP GR UR STRIP: 1.01 (ref 1–1.03)
UROBILINOGEN UR STRIP-ACNC: NORMAL

## 2024-06-24 PROCEDURE — 81003 URINALYSIS AUTO W/O SCOPE: CPT

## 2024-06-25 ENCOUNTER — OFFICE VISIT (OUTPATIENT)
Dept: HEMATOLOGY/ONCOLOGY | Facility: HOSPITAL | Age: 69
End: 2024-06-25
Attending: UROLOGY
Payer: MEDICARE

## 2024-06-25 VITALS
RESPIRATION RATE: 18 BRPM | DIASTOLIC BLOOD PRESSURE: 77 MMHG | TEMPERATURE: 98 F | HEART RATE: 68 BPM | OXYGEN SATURATION: 97 % | SYSTOLIC BLOOD PRESSURE: 133 MMHG

## 2024-06-25 DIAGNOSIS — C67.9 MALIGNANT NEOPLASM OF URINARY BLADDER, UNSPECIFIED SITE (HCC): Primary | ICD-10-CM

## 2024-06-25 PROCEDURE — 51720 TREATMENT OF BLADDER LESION: CPT

## 2024-06-25 RX ORDER — LIDOCAINE HYDROCHLORIDE 20 MG/ML
10 JELLY TOPICAL ONCE
Status: COMPLETED | OUTPATIENT
Start: 2024-06-25 | End: 2024-06-25

## 2024-06-25 RX ORDER — LIDOCAINE HYDROCHLORIDE 20 MG/ML
10 JELLY TOPICAL ONCE
Start: 2024-07-02 | End: 2024-07-02

## 2024-06-25 RX ADMIN — LIDOCAINE HYDROCHLORIDE 10 ML: 20 JELLY TOPICAL at 13:28:00

## 2024-06-25 NOTE — PROGRESS NOTES
Education Record    Learner:  Patient    Disease / Diagnosis: malignant neoplasm of urinary bladder    Barriers / Limitations:  None   Comments:    Method:  Discussion   Comments:    General Topics:  Medication, Side effects and symptom management, Plan of care reviewed, and Fall risk and prevention   Comments:    Outcome:  Shows understanding   Comments:    Pt here for first Gemzar bladder instillation. UA and culture results reviewed, pt denies UTI symptoms. Pt states he was sick and had a UTI after previous BCG instillation, he states he held it for 4 hours.     Urinary catheter inserted using sterile technique, urine return noted, gemzar instilled, and urinary catheter was removed. Instructed pt to hold for 2 hrs - he verbalized understanding. Pt discharged ambulatory in stable condition.

## 2024-06-26 ENCOUNTER — APPOINTMENT (OUTPATIENT)
Dept: URBAN - METROPOLITAN AREA CLINIC 244 | Age: 69
Setting detail: DERMATOLOGY
End: 2024-06-28

## 2024-06-26 DIAGNOSIS — L81.4 OTHER MELANIN HYPERPIGMENTATION: ICD-10-CM

## 2024-06-26 DIAGNOSIS — L57.0 ACTINIC KERATOSIS: ICD-10-CM

## 2024-06-26 DIAGNOSIS — L82.1 OTHER SEBORRHEIC KERATOSIS: ICD-10-CM

## 2024-06-26 DIAGNOSIS — D22 MELANOCYTIC NEVI: ICD-10-CM

## 2024-06-26 DIAGNOSIS — L84 CORNS AND CALLOSITIES: ICD-10-CM

## 2024-06-26 PROBLEM — D22.5 MELANOCYTIC NEVI OF TRUNK: Status: ACTIVE | Noted: 2024-06-26

## 2024-06-26 PROBLEM — D23.71 OTHER BENIGN NEOPLASM OF SKIN OF RIGHT LOWER LIMB, INCLUDING HIP: Status: ACTIVE | Noted: 2024-06-26

## 2024-06-26 PROCEDURE — 17003 DESTRUCT PREMALG LES 2-14: CPT

## 2024-06-26 PROCEDURE — 99213 OFFICE O/P EST LOW 20 MIN: CPT | Mod: 25

## 2024-06-26 PROCEDURE — 17000 DESTRUCT PREMALG LESION: CPT

## 2024-06-26 PROCEDURE — OTHER COUNSELING: OTHER

## 2024-06-26 PROCEDURE — OTHER LIQUID NITROGEN: OTHER

## 2024-06-26 PROCEDURE — OTHER MIPS QUALITY: OTHER

## 2024-06-26 ASSESSMENT — LOCATION SIMPLE DESCRIPTION DERM
LOCATION SIMPLE: POSTERIOR SCALP
LOCATION SIMPLE: LEFT FOREHEAD
LOCATION SIMPLE: LEFT PLANTAR SURFACE
LOCATION SIMPLE: ABDOMEN

## 2024-06-26 ASSESSMENT — LOCATION ZONE DERM
LOCATION ZONE: FACE
LOCATION ZONE: TRUNK
LOCATION ZONE: SCALP
LOCATION ZONE: FEET

## 2024-06-26 ASSESSMENT — LOCATION DETAILED DESCRIPTION DERM
LOCATION DETAILED: LEFT SUPERIOR MEDIAL FOREHEAD
LOCATION DETAILED: LEFT MEDIAL PLANTAR MIDFOOT
LOCATION DETAILED: MID-OCCIPITAL SCALP
LOCATION DETAILED: PERIUMBILICAL SKIN

## 2024-06-26 NOTE — PROCEDURE: LIQUID NITROGEN
Detail Level: Zone
Total Number Of Aks Treated: 10
Render In Bullet Format When Appropriate: No
Consent: The patient's consent was obtained including but not limited to risks of crusting, scabbing, blistering, scarring, darker or lighter pigmentary change, recurrence, incomplete removal and infection.
Duration Of Freeze Thaw-Cycle (Seconds): 0
Post-Care Instructions: I reviewed with the patient in detail post-care instructions. Patient is to wear sunprotection, and avoid picking at any of the treated lesions. Pt may apply Vaseline to crusted or scabbing areas.

## 2024-07-01 ENCOUNTER — LAB ENCOUNTER (OUTPATIENT)
Dept: LAB | Age: 69
End: 2024-07-01
Attending: UROLOGY
Payer: MEDICARE

## 2024-07-01 DIAGNOSIS — C67.9 MALIGNANT NEOPLASM OF URINARY BLADDER, UNSPECIFIED SITE (HCC): ICD-10-CM

## 2024-07-01 LAB
BILIRUB UR QL: NEGATIVE
CLARITY UR: CLEAR
GLUCOSE UR-MCNC: NORMAL MG/DL
HGB UR QL STRIP.AUTO: NEGATIVE
KETONES UR-MCNC: NEGATIVE MG/DL
LEUKOCYTE ESTERASE UR QL STRIP.AUTO: NEGATIVE
NITRITE UR QL STRIP.AUTO: NEGATIVE
PH UR: 6.5 [PH] (ref 5–8)
PROT UR-MCNC: NEGATIVE MG/DL
SP GR UR STRIP: 1.01 (ref 1–1.03)
UROBILINOGEN UR STRIP-ACNC: NORMAL

## 2024-07-01 PROCEDURE — 81003 URINALYSIS AUTO W/O SCOPE: CPT

## 2024-07-02 ENCOUNTER — NURSE ONLY (OUTPATIENT)
Dept: HEMATOLOGY/ONCOLOGY | Facility: HOSPITAL | Age: 69
End: 2024-07-02
Attending: UROLOGY
Payer: MEDICARE

## 2024-07-02 VITALS
BODY MASS INDEX: 27 KG/M2 | DIASTOLIC BLOOD PRESSURE: 74 MMHG | SYSTOLIC BLOOD PRESSURE: 119 MMHG | TEMPERATURE: 98 F | WEIGHT: 171.38 LBS | HEART RATE: 74 BPM | OXYGEN SATURATION: 96 %

## 2024-07-02 DIAGNOSIS — C67.9 MALIGNANT NEOPLASM OF URINARY BLADDER, UNSPECIFIED SITE (HCC): Primary | ICD-10-CM

## 2024-07-02 PROCEDURE — 51720 TREATMENT OF BLADDER LESION: CPT

## 2024-07-02 RX ORDER — LIDOCAINE HYDROCHLORIDE 20 MG/ML
10 JELLY TOPICAL ONCE
Start: 2024-07-09 | End: 2024-07-09

## 2024-07-02 RX ORDER — LIDOCAINE HYDROCHLORIDE 20 MG/ML
10 JELLY TOPICAL ONCE
Status: COMPLETED | OUTPATIENT
Start: 2024-07-02 | End: 2024-07-02

## 2024-07-02 RX ADMIN — LIDOCAINE HYDROCHLORIDE 10 ML: 20 JELLY TOPICAL at 13:31:00

## 2024-07-02 NOTE — PROGRESS NOTES
Education Record    Learner:  Patient    Disease / Diagnosis: Here for Gemzar bladder instillation.     Barriers / Limitations:  None    Method:  Brief focused, printed material and  reinforcement    General Topics:  Plan of care reviewed    Outcome:  Shows understanding. Pt tolerated Gemzar bladder instillation. Urojet used per patient request. Instructed to hold bladder for 2 hours. Left in stable condition.

## 2024-07-08 ENCOUNTER — LAB ENCOUNTER (OUTPATIENT)
Dept: LAB | Age: 69
End: 2024-07-08
Attending: UROLOGY
Payer: MEDICARE

## 2024-07-08 DIAGNOSIS — C67.9 MALIGNANT NEOPLASM OF URINARY BLADDER, UNSPECIFIED SITE (HCC): ICD-10-CM

## 2024-07-08 LAB
BILIRUB UR QL: NEGATIVE
CLARITY UR: CLEAR
GLUCOSE UR-MCNC: NORMAL MG/DL
HGB UR QL STRIP.AUTO: NEGATIVE
KETONES UR-MCNC: NEGATIVE MG/DL
LEUKOCYTE ESTERASE UR QL STRIP.AUTO: NEGATIVE
NITRITE UR QL STRIP.AUTO: NEGATIVE
PH UR: 7 [PH] (ref 5–8)
PROT UR-MCNC: NEGATIVE MG/DL
SP GR UR STRIP: <1.005 (ref 1–1.03)
UROBILINOGEN UR STRIP-ACNC: NORMAL

## 2024-07-08 PROCEDURE — 81003 URINALYSIS AUTO W/O SCOPE: CPT

## 2024-07-09 ENCOUNTER — NURSE ONLY (OUTPATIENT)
Dept: HEMATOLOGY/ONCOLOGY | Facility: HOSPITAL | Age: 69
End: 2024-07-09
Attending: UROLOGY
Payer: MEDICARE

## 2024-07-09 VITALS
TEMPERATURE: 97 F | DIASTOLIC BLOOD PRESSURE: 75 MMHG | SYSTOLIC BLOOD PRESSURE: 116 MMHG | HEART RATE: 66 BPM | OXYGEN SATURATION: 98 % | RESPIRATION RATE: 16 BRPM

## 2024-07-09 DIAGNOSIS — C67.9 MALIGNANT NEOPLASM OF URINARY BLADDER, UNSPECIFIED SITE (HCC): Primary | ICD-10-CM

## 2024-07-09 PROCEDURE — 51720 TREATMENT OF BLADDER LESION: CPT

## 2024-07-09 RX ORDER — LIDOCAINE HYDROCHLORIDE 20 MG/ML
10 JELLY TOPICAL ONCE
Status: COMPLETED | OUTPATIENT
Start: 2024-07-09 | End: 2024-07-09

## 2024-07-09 RX ORDER — LIDOCAINE HYDROCHLORIDE 20 MG/ML
10 JELLY TOPICAL ONCE
Start: 2024-07-16 | End: 2024-07-16

## 2024-07-09 RX ADMIN — LIDOCAINE HYDROCHLORIDE 10 ML: 20 JELLY TOPICAL at 14:27:00

## 2024-07-09 NOTE — PROGRESS NOTES
Education Record    Learner:  Patient    Disease / Diagnosis: Here for Gemzar Bladder instillation    Barriers / Limitations:  None    Method:  Brief focused, printed material and  reinforcement    General Topics:  Plan of care reviewed    Outcome:  Shows understanding. Pt tolerated treatment. Left in stable condition. Appts in place.

## 2024-07-12 ENCOUNTER — PATIENT MESSAGE (OUTPATIENT)
Dept: INTERNAL MEDICINE CLINIC | Facility: CLINIC | Age: 69
End: 2024-07-12

## 2024-07-12 DIAGNOSIS — E78.5 HYPERLIPIDEMIA, UNSPECIFIED HYPERLIPIDEMIA TYPE: Primary | ICD-10-CM

## 2024-07-12 NOTE — TELEPHONE ENCOUNTER
From: Torey Schroeder  To: Ari Nogueira  Sent: 7/12/2024 1:54 PM CDT  Subject: Statin    Good afternoon  after my last blood work we changed my prescription to twice the amount, from 20 to. 40,  I don’t think you informed [facility] of this change. I am almost out, can you please e fax or email it in to them as soon as possible?  Thank you,  Torey

## 2024-07-15 ENCOUNTER — LAB ENCOUNTER (OUTPATIENT)
Dept: LAB | Age: 69
End: 2024-07-15
Attending: UROLOGY
Payer: MEDICARE

## 2024-07-15 DIAGNOSIS — C67.9 MALIGNANT NEOPLASM OF URINARY BLADDER, UNSPECIFIED SITE (HCC): ICD-10-CM

## 2024-07-15 DIAGNOSIS — E78.5 HYPERLIPIDEMIA, UNSPECIFIED HYPERLIPIDEMIA TYPE: ICD-10-CM

## 2024-07-15 LAB
ALBUMIN SERPL-MCNC: 4.5 G/DL (ref 3.2–4.8)
ALBUMIN/GLOB SERPL: 1.6 {RATIO} (ref 1–2)
ALP LIVER SERPL-CCNC: 72 U/L
ALT SERPL-CCNC: 40 U/L
ANION GAP SERPL CALC-SCNC: 6 MMOL/L (ref 0–18)
AST SERPL-CCNC: 30 U/L (ref ?–34)
BILIRUB SERPL-MCNC: 0.4 MG/DL (ref 0.2–1.1)
BILIRUB UR QL: NEGATIVE
BUN BLD-MCNC: 13 MG/DL (ref 9–23)
BUN/CREAT SERPL: 18.6 (ref 10–20)
CALCIUM BLD-MCNC: 9.3 MG/DL (ref 8.7–10.4)
CHLORIDE SERPL-SCNC: 107 MMOL/L (ref 98–112)
CHOLEST SERPL-MCNC: 154 MG/DL (ref ?–200)
CLARITY UR: CLEAR
CO2 SERPL-SCNC: 27 MMOL/L (ref 21–32)
CREAT BLD-MCNC: 0.7 MG/DL
EGFRCR SERPLBLD CKD-EPI 2021: 100 ML/MIN/1.73M2 (ref 60–?)
FASTING PATIENT LIPID ANSWER: YES
FASTING STATUS PATIENT QL REPORTED: YES
GLOBULIN PLAS-MCNC: 2.8 G/DL (ref 2–3.5)
GLUCOSE BLD-MCNC: 89 MG/DL (ref 70–99)
GLUCOSE UR-MCNC: NORMAL MG/DL
HDLC SERPL-MCNC: 48 MG/DL (ref 40–59)
HGB UR QL STRIP.AUTO: NEGATIVE
KETONES UR-MCNC: NEGATIVE MG/DL
LDLC SERPL CALC-MCNC: 78 MG/DL (ref ?–100)
LEUKOCYTE ESTERASE UR QL STRIP.AUTO: 25
NITRITE UR QL STRIP.AUTO: NEGATIVE
NONHDLC SERPL-MCNC: 106 MG/DL (ref ?–130)
OSMOLALITY SERPL CALC.SUM OF ELEC: 290 MOSM/KG (ref 275–295)
PH UR: 7 [PH] (ref 5–8)
POTASSIUM SERPL-SCNC: 4.4 MMOL/L (ref 3.5–5.1)
PROT SERPL-MCNC: 7.3 G/DL (ref 5.7–8.2)
PROT UR-MCNC: NEGATIVE MG/DL
SODIUM SERPL-SCNC: 140 MMOL/L (ref 136–145)
SP GR UR STRIP: 1.01 (ref 1–1.03)
TRIGL SERPL-MCNC: 162 MG/DL (ref 30–149)
UROBILINOGEN UR STRIP-ACNC: NORMAL
VLDLC SERPL CALC-MCNC: 25 MG/DL (ref 0–30)

## 2024-07-15 PROCEDURE — 80053 COMPREHEN METABOLIC PANEL: CPT

## 2024-07-15 PROCEDURE — 81001 URINALYSIS AUTO W/SCOPE: CPT

## 2024-07-15 PROCEDURE — 36415 COLL VENOUS BLD VENIPUNCTURE: CPT

## 2024-07-15 PROCEDURE — 80061 LIPID PANEL: CPT

## 2024-07-15 PROCEDURE — 87086 URINE CULTURE/COLONY COUNT: CPT

## 2024-07-15 RX ORDER — ROSUVASTATIN CALCIUM 40 MG/1
40 TABLET, COATED ORAL NIGHTLY
Qty: 90 TABLET | Refills: 3 | Status: SHIPPED | OUTPATIENT
Start: 2024-07-15

## 2024-07-15 NOTE — TELEPHONE ENCOUNTER
Patient called  Dr Nogueira sent prescription for sprinkle capsule for Rosuvastatin, pharmacy does not have in stock, patient requesting regular capsule   Patient is out of medication  Please call pharmacy  Tasked to RX

## 2024-07-15 NOTE — TELEPHONE ENCOUNTER
Fax received from [facility] regarding:  [employer]    \"Plan does not cover medication prescribed. Per RX benefit plan alternative medications include: Rosuvastatin tablets.  Please fax back with approval along with strength, quantity and refills\"    Second fax received from [facility]  \"Did we mean to send regular tablets\"  Please call so we can change, thank you\"    Faxes placed in green folder if needed    Tasked to Dr Nogueira

## 2024-07-16 ENCOUNTER — TELEPHONE (OUTPATIENT)
Dept: INTERNAL MEDICINE CLINIC | Facility: CLINIC | Age: 69
End: 2024-07-16

## 2024-07-16 ENCOUNTER — NURSE ONLY (OUTPATIENT)
Dept: HEMATOLOGY/ONCOLOGY | Facility: HOSPITAL | Age: 69
End: 2024-07-16
Attending: UROLOGY
Payer: MEDICARE

## 2024-07-16 DIAGNOSIS — C67.9 MALIGNANT NEOPLASM OF URINARY BLADDER, UNSPECIFIED SITE (HCC): Primary | ICD-10-CM

## 2024-07-16 PROCEDURE — 51720 TREATMENT OF BLADDER LESION: CPT

## 2024-07-16 RX ORDER — LIDOCAINE HYDROCHLORIDE 20 MG/ML
10 JELLY TOPICAL ONCE
Status: COMPLETED | OUTPATIENT
Start: 2024-07-16 | End: 2024-07-16

## 2024-07-16 RX ORDER — LIDOCAINE HYDROCHLORIDE 20 MG/ML
10 JELLY TOPICAL ONCE
Start: 2024-07-23 | End: 2024-07-23

## 2024-07-16 RX ADMIN — LIDOCAINE HYDROCHLORIDE 10 ML: 20 JELLY TOPICAL at 14:29:00

## 2024-07-16 NOTE — PROGRESS NOTES
Education Record    Learner:  Patient    Disease / Diagnosis: Here for Gemzar bladder instillation.     Barriers / Limitations:  None    Method:  Brief focused, printed material and  reinforcement    General Topics:  Plan of care reviewed    Outcome:  Shows understanding. Pt tolerated bladder instillation. Denies any urinary complications. Spoke to Helene at the Urology office regarding an in process culture - got a verbal to proceed with treatment today. Pt left in stable condition. Appts in place for next week and he is aware to hold his bladder for 2 hours.

## 2024-07-22 ENCOUNTER — LAB ENCOUNTER (OUTPATIENT)
Dept: LAB | Age: 69
End: 2024-07-22
Attending: UROLOGY
Payer: MEDICARE

## 2024-07-22 DIAGNOSIS — C67.9 MALIGNANT NEOPLASM OF URINARY BLADDER, UNSPECIFIED SITE (HCC): ICD-10-CM

## 2024-07-22 LAB
BILIRUB UR QL: NEGATIVE
CLARITY UR: CLEAR
GLUCOSE UR-MCNC: NORMAL MG/DL
HGB UR QL STRIP.AUTO: NEGATIVE
KETONES UR-MCNC: NEGATIVE MG/DL
LEUKOCYTE ESTERASE UR QL STRIP.AUTO: NEGATIVE
NITRITE UR QL STRIP.AUTO: NEGATIVE
PH UR: 7 [PH] (ref 5–8)
PROT UR-MCNC: NEGATIVE MG/DL
SP GR UR STRIP: 1 (ref 1–1.03)
UROBILINOGEN UR STRIP-ACNC: NORMAL

## 2024-07-22 PROCEDURE — 81003 URINALYSIS AUTO W/O SCOPE: CPT

## 2024-07-23 ENCOUNTER — NURSE ONLY (OUTPATIENT)
Dept: HEMATOLOGY/ONCOLOGY | Facility: HOSPITAL | Age: 69
End: 2024-07-23
Attending: UROLOGY
Payer: MEDICARE

## 2024-07-23 VITALS
HEART RATE: 70 BPM | DIASTOLIC BLOOD PRESSURE: 75 MMHG | RESPIRATION RATE: 16 BRPM | OXYGEN SATURATION: 96 % | SYSTOLIC BLOOD PRESSURE: 125 MMHG | TEMPERATURE: 98 F

## 2024-07-23 DIAGNOSIS — C67.9 MALIGNANT NEOPLASM OF URINARY BLADDER, UNSPECIFIED SITE (HCC): Primary | ICD-10-CM

## 2024-07-23 PROCEDURE — 51720 TREATMENT OF BLADDER LESION: CPT

## 2024-07-23 RX ORDER — LIDOCAINE HYDROCHLORIDE 20 MG/ML
10 JELLY TOPICAL ONCE
Status: COMPLETED | OUTPATIENT
Start: 2024-07-23 | End: 2024-07-23

## 2024-07-23 RX ORDER — LIDOCAINE HYDROCHLORIDE 20 MG/ML
10 JELLY TOPICAL ONCE
Start: 2024-07-30 | End: 2024-07-30

## 2024-07-23 RX ADMIN — LIDOCAINE HYDROCHLORIDE 10 ML: 20 JELLY TOPICAL at 14:08:00

## 2024-07-23 NOTE — PROGRESS NOTES
Education Record    Learner:  Patient    Pt here for: Gemzar Bladder instillation.     Barriers / Limitations:  None    Method:  Brief focused, printed material and  reinforcement    General Topics:  Plan of care reviewed    Outcome: Pt tolerated bladder instillation without issue. Left in stable condition. Appts in place. Left in stable condition.     
No chills/no fever

## 2024-07-29 ENCOUNTER — LAB ENCOUNTER (OUTPATIENT)
Dept: LAB | Age: 69
End: 2024-07-29
Attending: UROLOGY
Payer: MEDICARE

## 2024-07-29 DIAGNOSIS — C67.9 MALIGNANT NEOPLASM OF URINARY BLADDER, UNSPECIFIED SITE (HCC): ICD-10-CM

## 2024-07-29 PROCEDURE — 81001 URINALYSIS AUTO W/SCOPE: CPT

## 2024-07-29 PROCEDURE — 87086 URINE CULTURE/COLONY COUNT: CPT

## 2024-07-30 ENCOUNTER — TELEPHONE (OUTPATIENT)
Dept: SURGERY | Facility: CLINIC | Age: 69
End: 2024-07-30

## 2024-07-30 ENCOUNTER — NURSE ONLY (OUTPATIENT)
Dept: HEMATOLOGY/ONCOLOGY | Facility: HOSPITAL | Age: 69
End: 2024-07-30
Attending: UROLOGY
Payer: MEDICARE

## 2024-07-30 VITALS
OXYGEN SATURATION: 99 % | BODY MASS INDEX: 27 KG/M2 | SYSTOLIC BLOOD PRESSURE: 113 MMHG | HEART RATE: 84 BPM | DIASTOLIC BLOOD PRESSURE: 73 MMHG | WEIGHT: 172.19 LBS | TEMPERATURE: 98 F

## 2024-07-30 DIAGNOSIS — C67.9 MALIGNANT NEOPLASM OF URINARY BLADDER, UNSPECIFIED SITE (HCC): Primary | ICD-10-CM

## 2024-07-30 PROCEDURE — 51720 TREATMENT OF BLADDER LESION: CPT

## 2024-07-30 RX ORDER — LIDOCAINE HYDROCHLORIDE 20 MG/ML
10 JELLY TOPICAL ONCE
Status: COMPLETED | OUTPATIENT
Start: 2024-07-30 | End: 2024-07-30

## 2024-07-30 RX ORDER — LIDOCAINE HYDROCHLORIDE 20 MG/ML
10 JELLY TOPICAL ONCE
Status: CANCELLED
Start: 2024-07-30 | End: 2024-07-30

## 2024-07-30 RX ADMIN — LIDOCAINE HYDROCHLORIDE 10 ML: 20 JELLY TOPICAL at 14:20:00

## 2024-07-30 NOTE — PROGRESS NOTES
Education Record    Learner:  Patient    Pt here for: Gemzar bladder instillation    Barriers / Limitations:  None    Method:  Brief focused, printed material and  reinforcement    General Topics:  Plan of care reviewed    Outcome: Spoke to [facility] regarding patient's pending urine culture. JUNG Woodard spoke to Dr. Madrid who was okay with proceeding with treatment today. Patient presents with no c/o infection. Pt tolerated bladder instillation without issue. Left in stable condition.

## 2024-07-30 NOTE — TELEPHONE ENCOUNTER
Received a call from Gary one of the nurses at the [facility] asking about pt's urine test results and whether or not they can go forward with pt's last Gemzar TX this afternoon. I told him that I will have to show them to EMMANUELLEB and get back to him at X [phone].       I s/w KHB and showed him the UA and C&S results and he stated that it was OK for pt to proceed with the Gemzar TX as there was no blood seen on the UA.     I called Gary, the nurse back to inform him of this and he verbalized understanding and compliance and was thankful for the quick resp.

## 2024-08-11 ENCOUNTER — PATIENT MESSAGE (OUTPATIENT)
Dept: INTERNAL MEDICINE CLINIC | Facility: CLINIC | Age: 69
End: 2024-08-11

## 2024-08-11 ENCOUNTER — APPOINTMENT (OUTPATIENT)
Dept: ULTRASOUND IMAGING | Facility: HOSPITAL | Age: 69
End: 2024-08-11
Payer: MEDICARE

## 2024-08-11 ENCOUNTER — HOSPITAL ENCOUNTER (EMERGENCY)
Facility: HOSPITAL | Age: 69
Discharge: HOME | End: 2024-08-11
Attending: EMERGENCY MEDICINE
Payer: MEDICARE

## 2024-08-11 VITALS
BODY MASS INDEX: 27 KG/M2 | DIASTOLIC BLOOD PRESSURE: 80 MMHG | SYSTOLIC BLOOD PRESSURE: 110 MMHG | HEART RATE: 65 BPM | RESPIRATION RATE: 18 BRPM | WEIGHT: 170 LBS | OXYGEN SATURATION: 97 % | TEMPERATURE: 99 F

## 2024-08-11 DIAGNOSIS — M71.22 BAKER CYST, LEFT: Primary | ICD-10-CM

## 2024-08-11 PROCEDURE — 99284 EMERGENCY DEPT VISIT MOD MDM: CPT

## 2024-08-11 PROCEDURE — 93971 EXTREMITY STUDY: CPT

## 2024-08-11 RX ORDER — MELOXICAM 7.5 MG/1
7.5 TABLET ORAL DAILY
Qty: 14 TABLET | Refills: 0 | Status: SHIPPED | OUTPATIENT
Start: 2024-08-11 | End: 2024-08-25

## 2024-08-11 NOTE — ED PROVIDER NOTES
Patient Seen in: [facility] Emergency Department    History     Chief Complaint   Patient presents with    Leg Swelling     Stated Complaint: Possible Blood Clot Calf     HPI    68 yo M with PMH HL presenting without PMH presenting with several days of left popliteal and calf pain. No chest pain or shortness of breath.  No recent travel/surgeries, no recent immobilization.  No personal/family history of VTE.    Past Medical History:    Anxiety state    Back problem    Cancer (HCC)    bladder    Closed compression fracture of third lumbar vertebra (HCC)    External hemorrhoids    Hearing impairment    High cholesterol    Osteoarthritis       Past Surgical History:   Procedure Laterality Date    Colonoscopy      Cystoscopy,insert ureteral stent      Hemorrhoidectomy              Family History   Problem Relation Age of Onset    Prostate Cancer Father 68         metastatic dz    Cancer Mother         breast, thyroid    Heart Disorder Mother     Genetic Disease Daughter         retinitis pigmentosa    Alcohol and Other Disorders Associated Son         autistic    Cancer Maternal Grandmother         leukemia    Cancer Maternal Grandfather         unknown type; smoker    Cancer Sister         lung ca; smoker    Cancer Paternal Cousin Male         unknown type; smoker    Breast Cancer Niece 42       Social History     Socioeconomic History    Marital status:    Tobacco Use    Smoking status: Former     Current packs/day: 0.00     Average packs/day: 1 pack/day for 10.0 years (10.0 ttl pk-yrs)     Types: Cigarettes     Start date: 3/1/1980     Quit date: 3/1/1990     Years since quittin.4    Smokeless tobacco: Never   Vaping Use    Vaping status: Never Used   Substance and Sexual Activity    Alcohol use: Yes     Alcohol/week: 3.0 standard drinks of alcohol     Types: 3 Standard drinks or equivalent per week    Drug use: Yes     Types: Cannabis     Comment: daily       Review of Systems  :  Constitutional: As per HPI  Musculoskeletal: (+) LLE pain/swelling.    Positive for stated complaint: Possible Blood Clot Calf  Other systems are as noted in HPI.  Constitutional and vital signs reviewed.      All other systems reviewed and negative except as noted above.    PSFH elements reviewed from today and agreed except as otherwise stated in HPI.    Physical Exam     ED Triage Vitals [08/11/24 1704]   /75   Pulse 71   Resp 18   Temp 98.6 °F (37 °C)   Temp src Temporal   SpO2 96 %   O2 Device None (Room air)       Current:/75   Pulse 71   Temp 98.6 °F (37 °C) (Temporal)   Resp 18   Wt 77.1 kg   SpO2 96%   BMI 26.63 kg/m²         Physical Exam   Constitutional: No distress.   HEENT: MMM.  Head: Normocephalic.   Eyes: No injection.   Cardiovascular: LLE with 2+ DP/PT pulses.   Pulmonary/Chest: Effort normal.   Musculoskeletal: No gross deformity. LLE with popliteal/calf fullness without crepitance and with soft compartments.  Neurological: Alert. LLE with 5/5 strength proximally and distally.  Skin: Skin is warm.   Psychiatric: Cooperative.  Nursing note and vitals reviewed.        ED Course   Labs Reviewed - No data to display  US VENOUS DOPPLER LEG LEFT - DIAG IMG (CPT=93971)    Result Date: 8/11/2024  PROCEDURE: US VENOUS DOPPLER LEG LEFT-DIAG IMG (CPT=93971)  COMPARISON: None available.  INDICATIONS: Left lower leg pain and swelling.  TECHNIQUE: Color duplex Doppler venous ultrasound of the left lower extremity was performed in the usual manner.  FINDINGS: The femoral and popliteal veins appear normal. Visualized portions of the great and small saphenous, posterior tibial, and peroneal veins have a sonographically unremarkable appearance.   GRAYSCALE: There are no visible echogenic thrombi. Compressibility of the venous system is demonstrated. COLOR DOPPLER: Flow is present throughout the imaged veins. SPECTRAL: Where interrogated, augmentation is visualized on compression.  OTHER: In  the posteromedial aspect of the popliteal fossa, there is a 3.0 x 3.2 x 1.6 cm lobulated fluid collection. Extensive complex-appearing fluid collection extends into the posterior calf soft tissues measuring 9.3 x 1.3 x 4.0 cm.          CONCLUSION:  1. Negative for sonographic evidence of deep venous thrombosis in the left lower extremity.  2. A popliteal fossa Baker's cyst is noted.   3. Complex fluid extending into the left calf soft tissues, which could reflect partial Baker's cyst rupture.    Dictated by (CST): Seth Allison MD on 8/11/2024 at 6:11 PM     Finalized by (CST): Seth Allison MD on 8/11/2024 at 6:13 PM            MDM   DIFFERENTIAL DIAGNOSIS: After history and physical exam differential diagnosis includes but is not limited to Barnett's cyst, DVT.    Pulse ox: 96%:Normal on RA, as independently interpreted by myself    Medical Decision Making  Evaluation for left leg pain and swelling without neurovascular compromise and soft compartments without cutaneous change.  Sonography nonacute, notable for Baker's cyst and concern for possible/partial rupture and presentation consistent with same in setting of soft compartments - stable discharge with symptomatic care and orthopedic followup.    Problems Addressed:  Baker cyst, left: acute illness or injury    Amount and/or Complexity of Data Reviewed  Radiology: ordered and independent interpretation performed. Decision-making details documented in ED Course.     Details: US without obvious DVT as independently interpreted by myself    Risk  Prescription drug management.        I was wearing at minimum a facemask and eye protection throughout this encounter with handwashing performed prior and after patient evaluation without personal hand/facial/oropharyngeal contact and gloves worn throughout encounter. See note and/or contact this provider for further PPE details.      Disposition and Plan     Clinical Impression:  1. Baker cyst, left         Disposition:  Discharge    Follow-up:  Ari Nogueira MD  [address] [facility] [facility] [address] [phone]    Call  For followup and re-evaluation.    Fady Arteaga MD  [address] [facility] [address]  [facility] [id number] [phone]    Call  For ortho followup and re-evaluation.      Medications Prescribed:  Discharge Medication List as of 8/11/2024  6:42 PM        START taking these medications    Details   diclofenac 1 % External Gel Apply 2 g topically 4 (four) times daily as needed., Normal, Disp-100 g, R-0      Meloxicam 7.5 MG Oral Tab Take 1 tablet (7.5 mg total) by mouth daily for 14 days. Avoid motrin(ibuprofen) and aleve(naproxen) while taking this medication., Normal, Disp-14 tablet, R-0

## 2024-08-12 ENCOUNTER — TELEPHONE (OUTPATIENT)
Dept: INTERNAL MEDICINE CLINIC | Facility: CLINIC | Age: 69
End: 2024-08-12

## 2024-08-12 NOTE — TELEPHONE ENCOUNTER
From: Torey Schroeder  To: Ari Nogueira  Sent: 8/11/2024 2:42 PM CDT  Subject: Tender swollen leg    Hi Doc, My leg has been really sore for a couple of days now and today I noticed my entire leg is very swollen. What do you think? Since you did not have an opening on your schedule I did schedule for this Wednesday with Dr. Santos.    Please let me know your thought or if I can see you sooner.    Torey

## 2024-08-12 NOTE — TELEPHONE ENCOUNTER
----- Message from [employer]  sent at 8/11/2024  2:42 PM CDT -----  Regarding: Tender swollen leg  Contact: [phone]  Hi Doc,  My leg has been really sore for a couple of days now and today I noticed my entire leg is very swollen.  What do you think?  Since you did not have an opening on your schedule I did schedule for this Wednesday with Dr. Santos.    Please let me know your thought or if I can see you sooner.    Torey

## 2024-08-12 NOTE — TELEPHONE ENCOUNTER
S/w patient and relayed MD message.  Verbalizes understanding and agreement with tx. plan     Pt will call Dr Arteaga/Ortho office now to schedule an appointment. Pt requested appointment with Dr GANT for 8-14 be cancelled. I cancelled appointment as requested

## 2024-08-12 NOTE — TELEPHONE ENCOUNTER
Please let Torey know that I saw he was in the emergency room with the Baker's cyst.    This is usually managed by orthopedics.  Thankfully there were no blood clots so it would be more of an orthopedic problem.    I think if he can get into see Dr. Fady Arteaga who he was referred to from the emergency room that would be acceptable.    If he can call 's office and let the office know that he was in the emergency room with a Baker's cyst and was referred to him hopefully he can be seen this week.    Then I do not think he necessarily needs to see Dr. Dorsey or myself since this is more of an orthopedic problem.

## 2024-08-12 NOTE — TELEPHONE ENCOUNTER
To  - called patient who went to ER for left swollen leg , had US , Bakers cyst- diclofenac gel and Meloxicam  to F/U with DR. Arteaga  Patient has kerry with  on Wednesday and wants to know if he should keep it -thanks

## 2024-08-19 ENCOUNTER — OFFICE VISIT (OUTPATIENT)
Dept: ORTHOPEDICS CLINIC | Facility: CLINIC | Age: 69
End: 2024-08-19
Payer: MEDICARE

## 2024-08-19 DIAGNOSIS — M23.204 DEGENERATIVE TEAR OF MEDIAL MENISCUS OF LEFT KNEE: Primary | ICD-10-CM

## 2024-08-19 PROCEDURE — 99214 OFFICE O/P EST MOD 30 MIN: CPT | Performed by: STUDENT IN AN ORGANIZED HEALTH CARE EDUCATION/TRAINING PROGRAM

## 2024-08-20 NOTE — PROGRESS NOTES
Orthopaedic Surgery Follow-up Patient Visit  _______________________________________________________________________________________________________________  _______________________________________________________________________________________________________________    DATE OF VISIT: 2024     CHIEF COMPLAINT: Follow-up left knee    Chief Complaint   Patient presents with    Knee Pain     Patient was seen in ED on  for left knee pain, US doppler was completed. It showed a Baker's cyst. Patient believes that it ruptured.        HISTORY OF PRESENT ILLNESS: Torey Schroeder is a 69 year old male who presents to the clinic for follow-up for his left knee. Since last visit, he had worsening pain in the back of his left knee until 1 day when he experienced a pop in the back portion of the knee followed by increased pain in the calf but decreased pain in the knee joint itself.  He reports that overall symptoms have somewhat improved within the knee and he only has very intermittent buckling symptoms.  His calf pain is also starting to improve since the time of the rupture of the cyst in the back of the knee.  He reports he is walking further and progressing well with therapeutic exercises.  She denies any major injuries.  He denies any neurologic symptoms.    SOCIAL HISTORY  Social History     Socioeconomic History    Marital status:      Spouse name: Not on file    Number of children: Not on file    Years of education: Not on file    Highest education level: Not on file   Occupational History    Not on file   Tobacco Use    Smoking status: Former     Current packs/day: 0.00     Average packs/day: 1 pack/day for 10.0 years (10.0 ttl pk-yrs)     Types: Cigarettes     Start date: 3/1/1980     Quit date: 3/1/1990     Years since quittin.4    Smokeless tobacco: Never   Vaping Use    Vaping status: Never Used   Substance and Sexual Activity    Alcohol use: Yes     Alcohol/week: 3.0 standard drinks of alcohol      Types: 3 Standard drinks or equivalent per week    Drug use: Yes     Types: Cannabis     Comment: daily    Sexual activity: Not on file   Other Topics Concern    Not on file   Social History Narrative    Not on file     Social Determinants of Health     Financial Resource Strain: Not on file   Food Insecurity: Not on file   Transportation Needs: Not on file   Physical Activity: Not on file   Stress: Not on file   Social Connections: Not on file   Housing Stability: Not on file        PAST MEDICAL HISTORY  Past Medical History:    Anxiety state    Back problem    Cancer (HCC)    bladder    Closed compression fracture of third lumbar vertebra (HCC)    External hemorrhoids    Hearing impairment    High cholesterol    Osteoarthritis        PAST SURGICAL HISTORY  Past Surgical History:   Procedure Laterality Date    Colonoscopy      Cystoscopy,insert ureteral stent      Hemorrhoidectomy          MEDICATIONS  Reviewed   Meloxicam 7.5 MG Oral Tab Take 1 tablet (7.5 mg total) by mouth daily for 14 days. Avoid motrin(ibuprofen) and aleve(naproxen) while taking this medication. 14 tablet 0      gemcitabine (Gemzar) 2 g/100mL in sodium chloride 0.9% bladder instillation  2 g Bladder Instillation Once Lynda Madrid MD            ALLERGIES  No Known Allergies     FAMILY HISTORY  Family History   Problem Relation Age of Onset    Prostate Cancer Father 68         metastatic dz    Cancer Mother         breast, thyroid    Heart Disorder Mother     Genetic Disease Daughter         retinitis pigmentosa    Alcohol and Other Disorders Associated Son         autistic    Cancer Maternal Grandmother         leukemia    Cancer Maternal Grandfather         unknown type; smoker    Cancer Sister         lung ca; smoker    Cancer Paternal Cousin Male         unknown type; smoker    Breast Cancer Niece 42        REVIEW OF SYSTEMS  A 14 point review of systems was performed. Pertinent positives and negatives noted in the  HPI.    PHYSICAL EXAM  There were no vitals taken for this visit.     Constitutional: The patient is well-developed, well-nourished, in no acute distress.  Neurological: Alert and oriented to person, place, and time.  Psychiatric: Mood and affect normal.  Head: Normocephalic and atraumatic.  Cardiovascular: regular rate by palpation  Pulmonary/Chest: Effort normal. No respiratory distress. Breathing non-labored  Abdominal: Abdomen exhibits no distension.   Left  KNEE  INSPECTION/PALPATION  No readily apparent visual abnormalities of the knee.   No ecchymosis or erythema  Mild effusion.   No breaks in skin. Skin is euthermic.  Neutral alignment on standing  Slightly antalgic gait  Mildly tender   to medial joint line, NTTP to lateral joint line  ROM  0-130 degrees  KNEE STRENGTH  Flexion: 5/5  Extension: 5/5  STABILITY  1A lachman, stable anterior drawer  Stable posterior drawer  Stable to varus and valgus stress at 0 and 30 degrees  1 quadrant of lateral patellar translation  Negative J sign, negative apprehension  PERTINENT FINDINGS  Equivocal  Rocky    Negative [place]  Negative Patellar grind  SILT Kari/Saph/SPN/DPN/T  2+ DP/PT pulse, foot wwp     ASSESSMENT/PLAN: Torey Schroeder is a 69 year old male who presents to the Orthopaedic surgery clinic today for follow-up for his left knee now status post rupture of a Baker's cyst.  We discussed that overall this is a naturally self-limiting process that may take several weeks to resolve.  We also discussed that there is likely no role for operative intervention for this Baker's cyst.  At this time point, he will continue to do his exercise regimen, progress his weightbearing and function, and use elevation, cryotherapy, and anti-inflammatories as needed.    We discussed the diagnosis, changes, and therapies performed to date including possible treatments and their associated risks/benefits.  WEIGHT BEARING STATUS: Weightbearing as tolerated  RANGE-OF-MOTION  LIMITATIONS: as tolerated  NEW PRESCRIPTIONS:  We discussed medications for this condition including patient current regimen. Based on this discussion we have added/re-ordered no additional medications  IMAGING ORDERED: none  CONSULTS PLACED: We discussed the role of therapy for this condition including previous/ongoing therapy and specialist services. Based on this discussion we have opted not to place a consultation to other specialists/therapy  PROSTHESES/ORTHOTICS: the patient does not need prostheses/orthoses for the current condition  PROCEDURES: none    FOLLOW-UP: as needed    RADIOGRAPHS AT NEXT VISIT: none    I have personally seen Torey Schroeder and discussed in detail their plan of care. Prior to departure, they indicated agreement with and understanding of their plan of care and their follow-up as documented herein this note. Please note that this note was written in combination with voice recognition/dictation software and there is a possibility of transcription errors which were not identified at the time of note submission. If clarification is necessary, please contact the author or clinic staff.    Fady Arteaga MD  Orthopaedic Surgery  8/19/2024

## 2024-08-23 ENCOUNTER — PROCEDURE (OUTPATIENT)
Dept: SURGERY | Facility: CLINIC | Age: 69
End: 2024-08-23
Payer: MEDICARE

## 2024-08-23 DIAGNOSIS — C67.9 MALIGNANT NEOPLASM OF URINARY BLADDER, UNSPECIFIED SITE (HCC): Primary | ICD-10-CM

## 2024-08-23 PROCEDURE — 52000 CYSTOURETHROSCOPY: CPT | Performed by: UROLOGY

## 2024-08-23 PROCEDURE — 99213 OFFICE O/P EST LOW 20 MIN: CPT | Performed by: UROLOGY

## 2024-08-23 RX ORDER — CIPROFLOXACIN 500 MG/1
500 TABLET, FILM COATED ORAL ONCE
Status: SHIPPED | OUTPATIENT
Start: 2024-08-23

## 2024-08-23 NOTE — PROGRESS NOTES
Torey Schroeder is a 69 year old male.    HPI:     Chief Complaint   Patient presents with    Procedure     PT presents for cystoscopy. PT denies issues with urination or gross hematuria.        69-year-old male presents for surveillance cystoscopy.  Has a history of high-grade noninvasive bladder cancer diagnosed originally as low-grade noninvasive disease May 2022 at [facility].  He transferred his urologic care here.  Office cystoscopy at the time of his initial evaluation demonstrated suspicious lesions within the bladder.  Cystoscopy biopsy fulguration April 3, 2024 and gemcitabine instillation showed high-grade noninvasive disease.  He was referred for BCG instillation x 6 but did not tolerate due to fevers after 1 instillation.  He was switched subsequently gemcitabine which he underwent x 6 instillations.  Feels well.  No problems or concerns.  No gross hematuria or dysuria.        HISTORY:  Past Medical History:    Anxiety state    Back problem    Cancer (HCC)    bladder    Closed compression fracture of third lumbar vertebra (HCC)    External hemorrhoids    Hearing impairment    High cholesterol    Osteoarthritis      Past Surgical History:   Procedure Laterality Date    Colonoscopy      Cystoscopy,insert ureteral stent      Hemorrhoidectomy        Family History   Problem Relation Age of Onset    Prostate Cancer Father 68         metastatic dz    Cancer Mother         breast, thyroid    Heart Disorder Mother     Genetic Disease Daughter         retinitis pigmentosa    Alcohol and Other Disorders Associated Son         autistic    Cancer Maternal Grandmother         leukemia    Cancer Maternal Grandfather         unknown type; smoker    Cancer Sister         lung ca; smoker    Cancer Paternal Cousin Male         unknown type; smoker    Breast Cancer Niece 42      Social History:   Social History     Socioeconomic History    Marital status:    Tobacco Use    Smoking status:  Former     Current packs/day: 0.00     Average packs/day: 1 pack/day for 10.0 years (10.0 ttl pk-yrs)     Types: Cigarettes     Start date: 3/1/1980     Quit date: 3/1/1990     Years since quittin.5    Smokeless tobacco: Never   Vaping Use    Vaping status: Never Used   Substance and Sexual Activity    Alcohol use: Yes     Alcohol/week: 3.0 standard drinks of alcohol     Types: 3 Standard drinks or equivalent per week    Drug use: Yes     Types: Cannabis     Comment: daily        Medications (Active prior to today's visit):  Current Outpatient Medications   Medication Sig Dispense Refill    diclofenac 1 % External Gel Apply 2 g topically 4 (four) times daily as needed. 100 g 0    Meloxicam 7.5 MG Oral Tab Take 1 tablet (7.5 mg total) by mouth daily for 14 days. Avoid motrin(ibuprofen) and aleve(naproxen) while taking this medication. 14 tablet 0    rosuvastatin 40 MG Oral Tab Take 1 tablet (40 mg total) by mouth nightly. 90 tablet 3    CLONAZEPAM 0.5 MG Oral Tab TAKE 1 TABLET(0.5 MG) BY MOUTH DAILY AS NEEDED 30 tablet 1    sertraline 50 MG Oral Tab Take 1.5 tablets (75 mg total) by mouth daily. (Patient taking differently: Take 1.5 tablets (75 mg total) by mouth at bedtime.) 135 tablet 3    sennosides 17.2 MG Oral Tab Take 1 tablet (17.2 mg total) by mouth nightly. 30 tablet 0    cyanocobalamin 100 MCG Oral Tab Take 1 tablet (100 mcg total) by mouth daily.      Cholecalciferol 25 MCG (1000 UT) Oral Tab Take by mouth daily.         Allergies:  No Known Allergies      ROS:       PHYSICAL EXAM:   Torey Schroeder  : 1955  Referring Physician: No ref. provider found     Chief Complaint   Patient presents with    Procedure     PT presents for cystoscopy. PT denies issues with urination or gross hematuria.            CYSTOSCOPY    Anesthesia:  2% lidocaine gel    Urethra: Normal  Prostate / Pelvic: Normal  Bladder: Normal.  No tumor, stone, diverticulum, or glomerulation  U.O's: Normal  Trabeculation: grade  1-2      POST CYSTOSCOPY MEDICATIONS: sample one tablet Cipro 500mg given to patient    DIAGNOSIS:     PLAN: see below       ASSESSMENT/PLAN:   Assessment   Encounter Diagnosis   Name Primary?    Malignant neoplasm of urinary bladder, unspecified site (HCC) Yes       Recommend:  - Return to clinic in 3 months for office cystoscopy.  - Repeat upper tract imaging with CT urogram after his next cystoscopy.  - Follow-up on urine cytology from today.         Orders This Visit:  No orders of the defined types were placed in this encounter.      Meds This Visit:  Requested Prescriptions     Pending Prescriptions Disp Refills    ciprofloxacin (Cipro) tab 500 mg         Imaging & Referrals:  None     8/23/2024  Lynda Madrid MD

## 2024-08-26 LAB — NON GYNE INTERPRETATION: NEGATIVE

## 2024-11-25 ENCOUNTER — PROCEDURE (OUTPATIENT)
Dept: SURGERY | Facility: CLINIC | Age: 69
End: 2024-11-25
Payer: MEDICARE

## 2024-11-25 VITALS — HEART RATE: 62 BPM | SYSTOLIC BLOOD PRESSURE: 120 MMHG | DIASTOLIC BLOOD PRESSURE: 70 MMHG

## 2024-11-25 DIAGNOSIS — C67.9 MALIGNANT NEOPLASM OF URINARY BLADDER, UNSPECIFIED SITE (HCC): Primary | ICD-10-CM

## 2024-11-25 RX ORDER — CIPROFLOXACIN 500 MG/1
500 TABLET, FILM COATED ORAL ONCE
Status: COMPLETED | OUTPATIENT
Start: 2024-11-25 | End: 2024-11-25

## 2024-11-25 RX ADMIN — CIPROFLOXACIN 500 MG: 500 TABLET, FILM COATED ORAL at 13:44:00

## 2024-11-25 NOTE — PROGRESS NOTES
Torey Schroeder is a 69 year old male.    HPI:     Chief Complaint   Patient presents with    Procedure     Cysto BTS       69-year-old male presents for surveillance office cystoscopy.    Has a history of high-grade noninvasive bladder cancer diagnosed originally as low-grade noninvasive disease May 2022 at [facility].  He transferred his urologic care subsequently to [place].  Office cystoscopy at last visit 2024 unremarkable.  No gross hematuria or dysuria.  He completed gemcitabine instillation x 6 after his pathology was upgraded and a biopsy 2024.  Patient was unable to tolerate BCG installations in the past.  HISTORY:  Past Medical History:    Anxiety state    Back problem    Cancer (HCC)    bladder    Closed compression fracture of third lumbar vertebra (HCC)    External hemorrhoids    Hearing impairment    High cholesterol    Osteoarthritis      Past Surgical History:   Procedure Laterality Date    Colonoscopy      Cystoscopy,insert ureteral stent      Hemorrhoidectomy        Family History   Problem Relation Age of Onset    Prostate Cancer Father 68         metastatic dz    Cancer Mother         breast, thyroid    Heart Disorder Mother     Genetic Disease Daughter         retinitis pigmentosa    Alcohol and Other Disorders Associated Son         autistic    Cancer Maternal Grandmother         leukemia    Cancer Maternal Grandfather         unknown type; smoker    Cancer Sister         lung ca; smoker    Cancer Paternal Cousin Male         unknown type; smoker    Breast Cancer Niece 42      Social History:   Social History     Socioeconomic History    Marital status:    Tobacco Use    Smoking status: Former     Current packs/day: 0.00     Average packs/day: 1 pack/day for 10.0 years (10.0 ttl pk-yrs)     Types: Cigarettes     Start date: 3/1/1980     Quit date: 3/1/1990     Years since quittin.7    Smokeless tobacco: Never   Vaping Use    Vaping status:  Never Used   Substance and Sexual Activity    Alcohol use: Yes     Alcohol/week: 3.0 standard drinks of alcohol     Types: 3 Standard drinks or equivalent per week    Drug use: Yes     Types: Cannabis     Comment: daily        Medications (Active prior to today's visit):  Current Outpatient Medications   Medication Sig Dispense Refill    rosuvastatin 40 MG Oral Tab Take 1 tablet (40 mg total) by mouth nightly. 90 tablet 3    CLONAZEPAM 0.5 MG Oral Tab TAKE 1 TABLET(0.5 MG) BY MOUTH DAILY AS NEEDED 30 tablet 1    sertraline 50 MG Oral Tab Take 1.5 tablets (75 mg total) by mouth daily. (Patient taking differently: Take 1.5 tablets (75 mg total) by mouth at bedtime.) 135 tablet 3    sennosides 17.2 MG Oral Tab Take 1 tablet (17.2 mg total) by mouth nightly. 30 tablet 0    cyanocobalamin 100 MCG Oral Tab Take 1 tablet (100 mcg total) by mouth daily.      Cholecalciferol 25 MCG (1000 UT) Oral Tab Take by mouth daily.         Allergies:  Allergies[1]      ROS:       PHYSICAL EXAM:   Torey Schroeder  : 1955  Referring Physician: No ref. provider found     Chief Complaint   Patient presents with    Procedure     Cysto BTS           CYSTOSCOPY    Anesthesia:  2% lidocaine gel    Urethra: Normal  Prostate / Pelvic: Normal  Bladder: Normal.  No tumor, stone, diverticulum, or glomerulation  U.O's: Normal  Trabeculation: G1-2      POST CYSTOSCOPY MEDICATIONS: sample one tablet Cipro 500mg given to patient    DIAGNOSIS:     PLAN: see below       ASSESSMENT/PLAN:   Assessment   Encounter Diagnosis   Name Primary?    Malignant neoplasm of urinary bladder, unspecified site (HCC) Yes       Recommend:  - Repeat office cystoscopy in 3 months.  - Follow-up on urine for cytology.  - CT urogram to be done in the next 2 to 3 weeks.         Orders This Visit:  Orders Placed This Encounter   Procedures    Cytology, fluids       Meds This Visit:  Requested Prescriptions      No prescriptions requested or ordered in this encounter        Imaging & Referrals:  CT UROGRAM (W+WO) (CPT=74178)     11/25/2024  Lynda Madrid MD               [1] No Known Allergies

## 2024-11-26 LAB — NON GYNE INTERPRETATION: NEGATIVE

## 2024-12-03 ENCOUNTER — HOSPITAL ENCOUNTER (OUTPATIENT)
Dept: CT IMAGING | Facility: HOSPITAL | Age: 69
Discharge: HOME | End: 2024-12-03
Attending: UROLOGY
Payer: MEDICARE

## 2024-12-03 DIAGNOSIS — C67.9 MALIGNANT NEOPLASM OF URINARY BLADDER, UNSPECIFIED SITE (HCC): ICD-10-CM

## 2024-12-03 LAB
CREAT BLD-MCNC: 0.9 MG/DL
EGFRCR SERPLBLD CKD-EPI 2021: 93 ML/MIN/1.73M2 (ref 60–?)

## 2024-12-03 PROCEDURE — 82565 ASSAY OF CREATININE: CPT

## 2024-12-03 PROCEDURE — 74178 CT ABD&PLV WO CNTR FLWD CNTR: CPT | Performed by: UROLOGY

## 2024-12-20 NOTE — TELEPHONE ENCOUNTER
Refill request is for a maintenance medication and has met the criteria specified in the Ambulatory Medication Refill Standing Order for eligibility, visits, laboratory, alerts and was sent to the requested pharmacy.    Requested Prescriptions     Signed Prescriptions Disp Refills    sertraline 50 MG Oral Tab 135 tablet 0     Sig: Take 1.5 tablets (75 mg total) by mouth daily.     Authorizing Provider: VÍCTOR CONTE     Ordering User: FREDI MERIDA

## 2024-12-31 ENCOUNTER — PATIENT MESSAGE (OUTPATIENT)
Dept: INTERNAL MEDICINE CLINIC | Facility: CLINIC | Age: 69
End: 2024-12-31

## 2024-12-31 RX ORDER — ROSUVASTATIN CALCIUM 20 MG/1
20 TABLET, COATED ORAL NIGHTLY
Qty: 30 TABLET | Refills: 11 | OUTPATIENT
Start: 2024-12-31

## 2024-12-31 NOTE — TELEPHONE ENCOUNTER
Pt now on 40 mg daily.    Current refill request refused due to refill is either a duplicate request or has active refills at the pharmacy.  Check previous templates.    Requested Prescriptions     Refused Prescriptions Disp Refills    ROSUVASTATIN 20 MG Oral Tab [Pharmacy Med Name: ROSUVASTATIN 20MG TABLETS] 30 tablet 11     Sig: TAKE 1 TABLET(20 MG) BY MOUTH EVERY NIGHT     Refused By: YURI WINTER     Reason for Refusal: Refill not appropriate

## 2025-01-02 ENCOUNTER — OFFICE VISIT (OUTPATIENT)
Dept: INTERNAL MEDICINE CLINIC | Facility: CLINIC | Age: 70
End: 2025-01-02
Payer: MEDICARE

## 2025-01-02 ENCOUNTER — TELEPHONE (OUTPATIENT)
Dept: INTERNAL MEDICINE CLINIC | Facility: CLINIC | Age: 70
End: 2025-01-02

## 2025-01-02 VITALS
BODY MASS INDEX: 26.74 KG/M2 | TEMPERATURE: 98 F | HEART RATE: 67 BPM | WEIGHT: 170.38 LBS | OXYGEN SATURATION: 97 % | SYSTOLIC BLOOD PRESSURE: 104 MMHG | DIASTOLIC BLOOD PRESSURE: 60 MMHG | HEIGHT: 67 IN

## 2025-01-02 DIAGNOSIS — C67.9 MALIGNANT NEOPLASM OF URINARY BLADDER, UNSPECIFIED SITE (HCC): ICD-10-CM

## 2025-01-02 DIAGNOSIS — Z00.00 MEDICARE ANNUAL WELLNESS VISIT, SUBSEQUENT: Primary | ICD-10-CM

## 2025-01-02 DIAGNOSIS — Z00.00 ROUTINE HEALTH MAINTENANCE: ICD-10-CM

## 2025-01-02 DIAGNOSIS — I25.10 CORONARY ARTERY CALCIFICATION: ICD-10-CM

## 2025-01-02 DIAGNOSIS — I45.10 INCOMPLETE RBBB: ICD-10-CM

## 2025-01-02 DIAGNOSIS — Z12.5 PROSTATE CANCER SCREENING: ICD-10-CM

## 2025-01-02 DIAGNOSIS — I70.0 ATHEROSCLEROSIS OF ABDOMINAL AORTA (HCC): ICD-10-CM

## 2025-01-02 DIAGNOSIS — E78.5 HYPERLIPIDEMIA, UNSPECIFIED HYPERLIPIDEMIA TYPE: ICD-10-CM

## 2025-01-02 NOTE — PROGRESS NOTES
Torey Schroeder is a 69 year old male.  HPI:     Chief Complaint   Patient presents with    Physical     Reviewed Preventative/Wellness form with patient.        Torey presents for Medicare annual wellness visit    He generally feels well.    I did find CT scan order from Dr. Lion after consultation was done with him June 24, 2024.  Consultation was for chronic cough.  PFTs were normal.  A CT scan of chest was ordered and I did give him order for that.  I thought it might be wise for him to get this.  He indicates that his cough has subsided.    He has a very remote history of smoking 1-1/2 packs of cigarettes a day for 10 years but quit 40 years ago    Has had his flu vaccine and COVID booster.  He said Shingrix x 2 and PCV 20 February 2023    Talked about his coronary artery calcium score and I did give him his report that he had last year.  He did see Dr. Savage.  He is asymptomatic.  Will update EKG.  Will get labs to make sure his LDL is in a appropriate range    He had a CT urogram December 4024.  Noted was a history of bladder cancer and he does follow with Dr. Madrid.  Prostate was noted to be enlarged.  He had bilateral renal cysts.  No suspicious renal lesion.  Post L4-L5 spinal fusion.  Chronic diverticulosis.    His last cystoscopy appears to be November 25, 2024 which was normal.  Follow-up with repeat cystoscopy 3 months.  Follow-up urine for cytology.    I did review Dr. Savage's consult note from October 25 thousand 23.  Was felt that he had elevated calcium score of near 400.  At that time no signs of ischemic heart disease.  EKG unremarkable.  No indication at that time for stress testing and he does remain asymptomatic without any cardiac complaints.    His last colonoscopy was November 2015 so next colonoscopy will be due November 2025.      Current Outpatient Medications   Medication Sig Dispense Refill    sertraline 50 MG Oral Tab Take 1.5 tablets (75 mg total) by mouth daily. 135  tablet 0    rosuvastatin 40 MG Oral Tab Take 1 tablet (40 mg total) by mouth nightly. 90 tablet 3    CLONAZEPAM 0.5 MG Oral Tab TAKE 1 TABLET(0.5 MG) BY MOUTH DAILY AS NEEDED 30 tablet 1    cyanocobalamin 100 MCG Oral Tab Take 1 tablet (100 mcg total) by mouth daily.      Cholecalciferol 25 MCG (1000 UT) Oral Tab Take by mouth daily.      sennosides 17.2 MG Oral Tab Take 1 tablet (17.2 mg total) by mouth nightly. (Patient not taking: Reported on 2025) 30 tablet 0      Past Medical History:    Anxiety state    Back problem    Cancer (HCC)    bladder    Closed compression fracture of third lumbar vertebra (HCC)    External hemorrhoids    Hearing impairment    High cholesterol    Osteoarthritis      Social History:  Social History     Socioeconomic History    Marital status:    Tobacco Use    Smoking status: Former     Current packs/day: 0.00     Average packs/day: 1 pack/day for 10.0 years (10.0 ttl pk-yrs)     Types: Cigarettes     Start date: 3/1/1980     Quit date: 3/1/1990     Years since quittin.8    Smokeless tobacco: Never   Vaping Use    Vaping status: Some Days    Substances: THC    Devices: Disposable   Substance and Sexual Activity    Alcohol use: Yes     Alcohol/week: 3.0 standard drinks of alcohol     Types: 3 Standard drinks or equivalent per week    Drug use: Yes     Types: Cannabis     Comment: daily        REVIEW OF SYSTEMS:   GENERAL HEALTH:  feels well otherwise  RESPIRATORY:  Voices no shortness of breath with exertion or cough  CARDIOVASCULAR:  Voices no chest pain on exertion or shortness of breath  GI:   Voices no abdominal pain or changes of bowels   :Viices no urning or frequency of urination.  NEURO:  Voices no  headaches or dizziness    EXAM:   /60   Pulse 67   Temp 97.9 °F (36.6 °C)   Ht 5' 7\" (1.702 m)   Wt 170 lb 6.4 oz (77.3 kg)   SpO2 97%   BMI 26.69 kg/m²     GENERAL:  well developed, well nourished, in no apparent distress  SKIN:  no rashes , no  suspicious lesions  HEENT: atraumatic.  No obstructive ear canal wax.  Finger rub normal bilaterally.  Pharynx normal without exudate.  EYES:  PERRL. Sclera anicteric.  NECK:  Supple,  no adenopathy,  thyroid normal  LUNGS:  clear to auscultation.  Effort normal  CARDIO:  RRR without murmur.   S1 and S2 normal  GI:  good BS's,  no masses,   HSM or tenderness  EXTREMITIES : no cyanosis, clubbing or edema      General Health     In the past six months, have you lost more than 10 pounds without trying?: 2 - No    Has your appetite been poor?: No    Type of Diet: Balanced    How does the patient maintain a good energy level?: Daily Walks;Stretching    How would you describe your daily physical activity?: Moderate    How would you describe your current health state?: Good    How do you maintain positive mental well-being?: Social Interaction;Puzzles;Visiting Friends;Visiting Family         Have you had any immunizations at another office such as Influenza, Hepatitis B, Tetanus, or Pneumococcal?: Yes     Functional Ability     Bathing or Showering: Able without help    Toileting: Able without help    Dressing: Able without help    Eating: Able without help    Driving: Able without help    Preparing your meals: Able without help    Managing money/bills: Able without help    Taking medications as prescribed: Able without help    Are you able to afford your medications?: Yes    Hearing Problems?: No     Functional Status     Hearing Problems?: No    Vision Problems? : No    Difficulty walking?: No    Difficulty dressing or bathing?: No    Problems with daily activities? : No    Memory Problems?: No      Fall/Risk Assessment                                                              Depression Screening (PHQ-2/PHQ-9): Over the LAST 2 WEEKS                      Advance Directives     Do you have a healthcare power of ?: Yes    Do you have a living will?: Yes     Hearing Assessment (Required for AWV/SWV)      Hearing  Screening    Screening Method: Whisper Test  Whisper Test Result: Pass         Visual Acuity                   Cognitive Assessment     What day of the week is this?: Correct    What month is it?: Correct    What year is it?: Correct    Recall \"Ball\": Correct    Recall \"Flag\": Correct    Recall \"Tree\": Correct        Torey Schroeder's SCREENING SCHEDULE   Tests on this list are recommended by your physician but may not be covered, or covered at this frequency, by your insurer. Please check with your insurance carrier before scheduling to verify coverage.   PREVENTATIVE SERVICES  INDICATIONS AND SCHEDULE Internal Lab or Procedure External Lab or Procedure   Diabetes Screening      HbgA1C   Annually No results found for: \"A1C\"      No data to display                Fasting Blood Sugar (FSB)Annually Glucose (mg/dL)   Date Value   07/15/2024 89         No data to display               Cardiovascular Disease Screening     LDL Annually LDL Cholesterol (mg/dL)   Date Value   07/15/2024 78        EKG One Time     Colorectal Cancer Screening      Colonoscopy Screen every 10 years Health Maintenance   Topic Date Due    Colorectal Cancer Screening  11/05/2025    Update Health Maintenance if applicable    Flex Sigmoidoscopy Screen every 5 years No results found for this or any previous visit.      No data to display                 Fecal Occult Blood Annually No results found for: \"FOB\", \"OCCULTSTOOL\"      No data to display                Glaucoma Screening      Ophthalmology Visit Annually     Bone Density Screening      Dexascan Every two years Last Dexa Scan:    XR DEXA BONE DENSITOMETRY (CPT=77080) 07/28/2023        No data to display               Pap and Pelvic      Pap: Every 3 yrs age 21-65 or Pap+HPV every 5 yrs age 30-65, age 65 and older at high risk   No recommendations at this time Update Health Maintenance if applicable    Chlamydia  Annually if high risk No results found for: \"CHLAMYDIA\"      No data to display                 Screening Mammogram      Mammogram  Annually No recommendations at this time Update Health Maintenance if applicable   Immunizations      Influenza No orders found for this or any previous visit. Update Immunization Activity if applicable    Pneumococcal No orders found for this or any previous visit. Update Immunization Activity if applicable    Hepatitis B No orders found for this or any previous visit. Update Immunization Activity if applicable    Tetanus No orders found for this or any previous visit. Update Immunization Activity if applicable    Zoster (Not covered by Medicare Part B) No orders found for this or any previous visit. Update Immunization Activity if applicable     SPECIFIC DISEASE MONITORING Internal Lab or Procedure External Lab or Procedure   Annual Monitoring of Persistent     Medications (ACE/ARB, digoxin, diuretics)    Potassium  Annually Potassium (mmol/L)   Date Value   07/15/2024 4.4         No data to display                Creatinine  Annually Creatinine (mg/dL)   Date Value   07/15/2024 0.70         No data to display                Digoxin Serum Conc  Annually No results found for: \"DIGOXIN\"      No data to display                Diabetes      HgbA1C  Annually No results found for: \"A1C\"      No data to display                Creat/alb ratio  Annually      LDL  Annually LDL Cholesterol (mg/dL)   Date Value   07/15/2024 78         No data to display                 Dilated Eye exam  Annually      No data to display                   No data to display                COPD      Spirometry Testing Annually No results found for this or any previous visit.      No data to display                       ASSESSMENT AND PLAN:     1. Medicare annual wellness visit, subsequent  Medicare annual wellness visit.    2. Hyperlipidemia, unspecified hyperlipidemia type  Hyperlipidemia.  Update lipids.  On rosuvastatin.  - CBC With Differential With Platelet; Future  - Comp Metabolic Panel  (14); Future  - Lipid Panel; Future  - EKG 12 Lead to be performed at [facility]; Future    3. Coronary artery calcification  Asymptomatic.  He has seen cardiology.  No stress test at that time needed.  Will update EKG.  Check lipids.  - CBC With Differential With Platelet; Future  - Comp Metabolic Panel (14); Future  - Lipid Panel; Future  - EKG 12 Lead to be performed at [facility]; Future    4. Atherosclerosis of abdominal aorta (HCC)    - EKG 12 Lead to be performed at [facility]; Future    5. Incomplete RBBB  Update EKG.  Symptomatic    6. Routine health maintenance  See above for vaccine discussion    7. Malignant neoplasm of urinary bladder, unspecified site (HCC)  Follows closely with Dr. Madrid.  See above.    8. Prostate cancer screening  Check PSA.  - PSA Total, Screen; Future    This visit was 30 minutes.  I spent 10 minutes before visit preparing and reviewing old records.  Greater than 50% of the visit was engaged in counseling and review of past data.    Problem list as noted in electronic health record reviewed.  Stable.  Will be followed.       The patient indicates understanding of these issues and agrees to the plan.    Ari Nogueira MD  1/2/2025  3:31 PM

## 2025-01-02 NOTE — TELEPHONE ENCOUNTER
Rosuvastatin 1 yr ordered on 7/15/2024 - [employer] sent to pt instructing him to follow up with [facility].

## 2025-01-03 ENCOUNTER — LAB ENCOUNTER (OUTPATIENT)
Dept: LAB | Age: 70
End: 2025-01-03
Attending: INTERNAL MEDICINE
Payer: MEDICARE

## 2025-01-03 ENCOUNTER — TELEPHONE (OUTPATIENT)
Dept: INTERNAL MEDICINE CLINIC | Facility: CLINIC | Age: 70
End: 2025-01-03

## 2025-01-03 DIAGNOSIS — E78.5 HYPERLIPIDEMIA, UNSPECIFIED HYPERLIPIDEMIA TYPE: ICD-10-CM

## 2025-01-03 DIAGNOSIS — Z12.5 PROSTATE CANCER SCREENING: ICD-10-CM

## 2025-01-03 DIAGNOSIS — I25.10 CORONARY ARTERY CALCIFICATION: ICD-10-CM

## 2025-01-03 DIAGNOSIS — I70.0 ATHEROSCLEROSIS OF ABDOMINAL AORTA (HCC): ICD-10-CM

## 2025-01-03 LAB
ALBUMIN SERPL-MCNC: 4.8 G/DL (ref 3.2–4.8)
ALBUMIN/GLOB SERPL: 1.7 {RATIO} (ref 1–2)
ALP LIVER SERPL-CCNC: 76 U/L
ALT SERPL-CCNC: 38 U/L
ANION GAP SERPL CALC-SCNC: 7 MMOL/L (ref 0–18)
AST SERPL-CCNC: 27 U/L (ref ?–34)
ATRIAL RATE: 59 BPM
BASOPHILS # BLD AUTO: 0.12 X10(3) UL (ref 0–0.2)
BASOPHILS NFR BLD AUTO: 1.3 %
BILIRUB SERPL-MCNC: 0.6 MG/DL (ref 0.2–1.1)
BUN BLD-MCNC: 14 MG/DL (ref 9–23)
BUN/CREAT SERPL: 19.7 (ref 10–20)
CALCIUM BLD-MCNC: 9.7 MG/DL (ref 8.7–10.4)
CHLORIDE SERPL-SCNC: 104 MMOL/L (ref 98–112)
CHOLEST SERPL-MCNC: 191 MG/DL (ref ?–200)
CO2 SERPL-SCNC: 29 MMOL/L (ref 21–32)
COMPLEXED PSA SERPL-MCNC: 4.08 NG/ML (ref ?–4)
CREAT BLD-MCNC: 0.71 MG/DL
DEPRECATED RDW RBC AUTO: 41.8 FL (ref 35.1–46.3)
EGFRCR SERPLBLD CKD-EPI 2021: 100 ML/MIN/1.73M2 (ref 60–?)
EOSINOPHIL # BLD AUTO: 0.26 X10(3) UL (ref 0–0.7)
EOSINOPHIL NFR BLD AUTO: 2.7 %
ERYTHROCYTE [DISTWIDTH] IN BLOOD BY AUTOMATED COUNT: 13.5 % (ref 11–15)
FASTING PATIENT LIPID ANSWER: YES
FASTING STATUS PATIENT QL REPORTED: YES
GLOBULIN PLAS-MCNC: 2.8 G/DL (ref 2–3.5)
GLUCOSE BLD-MCNC: 102 MG/DL (ref 70–99)
HCT VFR BLD AUTO: 42.5 %
HDLC SERPL-MCNC: 45 MG/DL (ref 40–59)
HGB BLD-MCNC: 14.4 G/DL
IMM GRANULOCYTES # BLD AUTO: 0.02 X10(3) UL (ref 0–1)
IMM GRANULOCYTES NFR BLD: 0.2 %
LDLC SERPL CALC-MCNC: 134 MG/DL (ref ?–100)
LYMPHOCYTES # BLD AUTO: 2.33 X10(3) UL (ref 1–4)
LYMPHOCYTES NFR BLD AUTO: 24.4 %
MCH RBC QN AUTO: 28.8 PG (ref 26–34)
MCHC RBC AUTO-ENTMCNC: 33.9 G/DL (ref 31–37)
MCV RBC AUTO: 85 FL
MONOCYTES # BLD AUTO: 1.19 X10(3) UL (ref 0.1–1)
MONOCYTES NFR BLD AUTO: 12.5 %
NEUTROPHILS # BLD AUTO: 5.61 X10 (3) UL (ref 1.5–7.7)
NEUTROPHILS # BLD AUTO: 5.61 X10(3) UL (ref 1.5–7.7)
NEUTROPHILS NFR BLD AUTO: 58.9 %
NONHDLC SERPL-MCNC: 146 MG/DL (ref ?–130)
OSMOLALITY SERPL CALC.SUM OF ELEC: 291 MOSM/KG (ref 275–295)
P AXIS: 14 DEGREES
P-R INTERVAL: 152 MS
PLATELET # BLD AUTO: 313 10(3)UL (ref 150–450)
POTASSIUM SERPL-SCNC: 4.6 MMOL/L (ref 3.5–5.1)
PROT SERPL-MCNC: 7.6 G/DL (ref 5.7–8.2)
Q-T INTERVAL: 424 MS
QRS DURATION: 94 MS
QTC CALCULATION (BEZET): 419 MS
R AXIS: -9 DEGREES
RBC # BLD AUTO: 5 X10(6)UL
SODIUM SERPL-SCNC: 140 MMOL/L (ref 136–145)
T AXIS: 9 DEGREES
TRIGL SERPL-MCNC: 67 MG/DL (ref 30–149)
VENTRICULAR RATE: 59 BPM
VLDLC SERPL CALC-MCNC: 12 MG/DL (ref 0–30)
WBC # BLD AUTO: 9.5 X10(3) UL (ref 4–11)

## 2025-01-03 PROCEDURE — 85025 COMPLETE CBC W/AUTO DIFF WBC: CPT

## 2025-01-03 PROCEDURE — 36415 COLL VENOUS BLD VENIPUNCTURE: CPT

## 2025-01-03 PROCEDURE — 93005 ELECTROCARDIOGRAM TRACING: CPT

## 2025-01-03 PROCEDURE — 80061 LIPID PANEL: CPT

## 2025-01-03 PROCEDURE — 80053 COMPREHEN METABOLIC PANEL: CPT

## 2025-01-03 PROCEDURE — 93010 ELECTROCARDIOGRAM REPORT: CPT | Performed by: STUDENT IN AN ORGANIZED HEALTH CARE EDUCATION/TRAINING PROGRAM

## 2025-01-03 NOTE — TELEPHONE ENCOUNTER
Juan Jose Howell.  I saw Torey for his annual physical.  I included a PSA for prostate cancer screening.  His value came out 4.08.  I asked him to message you as to what your recommendations are for next steps.  Thank you.  Cameron.

## 2025-01-06 ENCOUNTER — TELEPHONE (OUTPATIENT)
Dept: INTERNAL MEDICINE CLINIC | Facility: CLINIC | Age: 70
End: 2025-01-06

## 2025-01-06 ENCOUNTER — TELEPHONE (OUTPATIENT)
Dept: SURGERY | Facility: CLINIC | Age: 70
End: 2025-01-06

## 2025-01-06 NOTE — TELEPHONE ENCOUNTER
- s/w pt; pt identity verified with name and   - read [employer] message to pt, pt acknowledged understanding of all and will be sure to get PSA tested a few days prior to his appt on 25.    - pt denies further questions  - encounter complete  Future Appointments   Date Time Provider Department Center   2025  1:30 PM Lydna Madrid MD [facility]         ----- Message from Lynda Madrid sent at 2025  8:35 AM CST -----  Urology staff,  This patient had a mildly elevated serum PSA noted checked by his primary care physician a few days ago.  He has an upcoming appointment to see me in February for repeat office cystoscopy.  Please call the patient and asked that he repeat his PSA 1 to 2 days prior to that appointment.  I placed an order for the test to be done.

## 2025-01-06 NOTE — TELEPHONE ENCOUNTER
Lynda Madrid MD Kraman, David, MD Hi Dave,  He is scheduled to see me in late February for his repeat office cystoscopy.  I will have the office contact him and asked that he repeat his PSA 1 to 2 days prior to that appointment.  Thank you for letting me know.

## 2025-02-06 ENCOUNTER — LAB ENCOUNTER (OUTPATIENT)
Dept: LAB | Age: 70
End: 2025-02-06
Attending: UROLOGY
Payer: MEDICARE

## 2025-02-06 DIAGNOSIS — R97.20 ELEVATED PSA: ICD-10-CM

## 2025-02-06 LAB — PSA SERPL-MCNC: 4.63 NG/ML (ref ?–4)

## 2025-02-06 PROCEDURE — 36415 COLL VENOUS BLD VENIPUNCTURE: CPT

## 2025-02-06 PROCEDURE — 84153 ASSAY OF PSA TOTAL: CPT

## 2025-02-22 ENCOUNTER — LAB ENCOUNTER (OUTPATIENT)
Dept: LAB | Age: 70
End: 2025-02-22
Attending: UROLOGY
Payer: MEDICARE

## 2025-02-22 DIAGNOSIS — C67.9 MALIGNANT NEOPLASM OF URINARY BLADDER, UNSPECIFIED SITE (HCC): ICD-10-CM

## 2025-02-22 LAB
BILIRUB UR QL: NEGATIVE
CLARITY UR: CLEAR
GLUCOSE UR-MCNC: NORMAL MG/DL
HGB UR QL STRIP.AUTO: NEGATIVE
KETONES UR-MCNC: NEGATIVE MG/DL
LEUKOCYTE ESTERASE UR QL STRIP.AUTO: NEGATIVE
NITRITE UR QL STRIP.AUTO: NEGATIVE
PH UR: 6.5 [PH] (ref 5–8)
PROT UR-MCNC: NEGATIVE MG/DL
SP GR UR STRIP: 1.02 (ref 1–1.03)
UROBILINOGEN UR STRIP-ACNC: NORMAL

## 2025-02-22 PROCEDURE — 81003 URINALYSIS AUTO W/O SCOPE: CPT

## 2025-02-24 ENCOUNTER — PROCEDURE (OUTPATIENT)
Dept: SURGERY | Facility: CLINIC | Age: 70
End: 2025-02-24
Payer: MEDICARE

## 2025-02-24 VITALS — DIASTOLIC BLOOD PRESSURE: 68 MMHG | HEART RATE: 64 BPM | SYSTOLIC BLOOD PRESSURE: 90 MMHG

## 2025-02-24 DIAGNOSIS — C67.9 MALIGNANT NEOPLASM OF URINARY BLADDER, UNSPECIFIED SITE (HCC): Primary | ICD-10-CM

## 2025-02-24 DIAGNOSIS — R97.20 ELEVATED PSA: ICD-10-CM

## 2025-02-24 NOTE — PROGRESS NOTES
Torey Schroeder is a 69 year old male.    HPI:   No chief complaint on file.    69-year-old male presents for surveillance cystoscopy last seen in the office 2024.   Has a history of high-grade noninvasive bladder cancer diagnosed originally has low-grade noninvasive disease May 2022 at [facility].  He transferred his urologic care subsequently to [place].  Surveillance cystoscopy 2024 is unremarkable.  He denies any gross hematuria or dysuria.  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  He could not tolerate BCG installations due to side effects.    Noted 2025 to have mildly elevated serum PSA at 4.08.  Repeat PSA 2025 4.63.  Digital prostate exams at initial presentation demonstrated no significant abnormalities.  He has a known family history of prostate cancer in his father who  of metastatic disease.  CT urogram December 3, 2024 demonstrated nonspecific bladder wall thickening, findings consistent with BPH, bilateral renal cysts.    HISTORY:  Past Medical History:    Anxiety state    Back problem    Cancer (HCC)    bladder    Closed compression fracture of third lumbar vertebra (HCC)    External hemorrhoids    Hearing impairment    High cholesterol    Osteoarthritis      Past Surgical History:   Procedure Laterality Date    Colonoscopy      Cystoscopy,insert ureteral stent      Hemorrhoidectomy        Family History   Problem Relation Age of Onset    Prostate Cancer Father 68         metastatic dz    Cancer Mother         breast, thyroid    Heart Disorder Mother     Genetic Disease Daughter         retinitis pigmentosa    Alcohol and Other Disorders Associated Son         autistic    Cancer Maternal Grandmother         leukemia    Cancer Maternal Grandfather         unknown  type; smoker    Cancer Sister         lung ca; smoker    Cancer Paternal Cousin Male         unknown type; smoker    Breast Cancer Niece 42      Social History:   Social History     Socioeconomic History    Marital status:    Tobacco Use    Smoking status: Former     Current packs/day: 0.00     Average packs/day: 1 pack/day for 10.0 years (10.0 ttl pk-yrs)     Types: Cigarettes     Start date: 3/1/1980     Quit date: 3/1/1990     Years since quittin.0    Smokeless tobacco: Never   Vaping Use    Vaping status: Some Days    Substances: THC    Devices: Disposable   Substance and Sexual Activity    Alcohol use: Yes     Alcohol/week: 3.0 standard drinks of alcohol     Types: 3 Standard drinks or equivalent per week    Drug use: Yes     Types: Cannabis     Comment: daily        Medications (Active prior to today's visit):  Current Outpatient Medications   Medication Sig Dispense Refill    sertraline 50 MG Oral Tab Take 1.5 tablets (75 mg total) by mouth daily. 135 tablet 0    rosuvastatin 40 MG Oral Tab Take 1 tablet (40 mg total) by mouth nightly. 90 tablet 3    CLONAZEPAM 0.5 MG Oral Tab TAKE 1 TABLET(0.5 MG) BY MOUTH DAILY AS NEEDED 30 tablet 1    sennosides 17.2 MG Oral Tab Take 1 tablet (17.2 mg total) by mouth nightly. (Patient not taking: Reported on 2025) 30 tablet 0    cyanocobalamin 100 MCG Oral Tab Take 1 tablet (100 mcg total) by mouth daily.      Cholecalciferol 25 MCG (1000 UT) Oral Tab Take by mouth daily.         Allergies:  Allergies[1]      ROS:       PHYSICAL EXAM:   Torey Schroeder  : 1955  Referring Physician: No ref. provider found     No chief complaint on file.          CYSTOSCOPY    Anesthesia:  2% lidocaine gel    Urethra: Normal  Prostate / Pelvic: Normal  Bladder: Normal.  No tumor, stone, diverticulum, or glomerulation  U.O's: Normal  Trabeculation: grade 3      POST CYSTOSCOPY MEDICATIONS: sample one tablet Cipro 500mg given to patient    DIAGNOSIS:     PLAN: see  below       ASSESSMENT/PLAN:   Assessment   Encounter Diagnoses   Name Primary?    Malignant neoplasm of urinary bladder, unspecified site (HCC) Yes    Elevated PSA    recommend:  - Repeat surveillance cystoscopy in 3 months.  - Follow-up on urine cytology from today.  - Repeat PSA prior to next visit in 3 months.  If it continues to increase, we will proceed with MRI of the prostate in anticipation of biopsy depending on results given his family history.  - Repeat urinalysis prior to his next visit.           Orders This Visit:  Orders Placed This Encounter   Procedures    PSA Diagnostic [E]    Urinalysis, Routine    Cytology, fluids       Meds This Visit:  Requested Prescriptions      No prescriptions requested or ordered in this encounter       Imaging & Referrals:  None     2/24/2025  Lynda Madrid MD               [1] No Known Allergies

## 2025-02-25 LAB — NON GYNE INTERPRETATION: NEGATIVE

## 2025-03-06 ENCOUNTER — PATIENT MESSAGE (OUTPATIENT)
Dept: SURGERY | Facility: CLINIC | Age: 70
End: 2025-03-06

## 2025-03-06 DIAGNOSIS — Z98.1 STATUS POST LUMBAR SPINAL FUSION: ICD-10-CM

## 2025-03-06 DIAGNOSIS — M48.062 SPINAL STENOSIS OF LUMBAR REGION WITH NEUROGENIC CLAUDICATION: Primary | ICD-10-CM

## 2025-03-07 NOTE — TELEPHONE ENCOUNTER
2/22/23 patient had lumbar surgery, laminectomies and fusion. The other day patient started getting pain in right leg. Pain is worse to sit down. Tylenol does not help. Taking Meloxecam that he had before. No pain in back, only down right leg. Patient looking for recommendations of what to do from here. Should he see us or PCP first. Does he need imaging? Is there any meds that can help?

## 2025-03-07 NOTE — TELEPHONE ENCOUNTER
Noted message below, patient is experiencing discomfort in anterior right thigh, similar to pre surgical pain.   2nd message received patient reports discomfort is getting worse..    MyChart response sent to patient to advise of nonpharmacological pain management and advise to schedule an appointment with Jorge, routed to provider as update.     S/p lumbar decompression and L4-5 fusion     \"Diagnosis:  1. Spinal stenosis of lumbar region with neurogenic claudication        Assessment/Plan:  Our patient is doing very well with resolution of his preoperative symptoms.  He is very happy with the outcome.  I would like to obtain a current x-ray of the lumbar spine to assess his fusion and he may call us once it has been completed so we can review it.  No further follow-up is necessary unless indicated.\"

## 2025-03-11 NOTE — TELEPHONE ENCOUNTER
Patient should be evaluated in the office.  I will provide him with a an updated order for an MRI of the lumbar spine

## 2025-03-20 ENCOUNTER — OFFICE VISIT (OUTPATIENT)
Facility: CLINIC | Age: 70
End: 2025-03-20
Payer: MEDICARE

## 2025-03-20 VITALS — HEIGHT: 67 IN | BODY MASS INDEX: 26.68 KG/M2 | WEIGHT: 170 LBS

## 2025-03-20 DIAGNOSIS — Z98.1 STATUS POST LUMBAR SPINAL FUSION: ICD-10-CM

## 2025-03-20 DIAGNOSIS — M48.062 SPINAL STENOSIS OF LUMBAR REGION WITH NEUROGENIC CLAUDICATION: Primary | ICD-10-CM

## 2025-03-20 RX ORDER — AMOXICILLIN 500 MG/1
TABLET, FILM COATED ORAL
COMMUNITY
Start: 2025-01-07

## 2025-03-20 RX ORDER — IBUPROFEN 600 MG/1
TABLET, FILM COATED ORAL
COMMUNITY
Start: 2025-01-07

## 2025-03-20 RX ORDER — CHLORHEXIDINE GLUCONATE ORAL RINSE 1.2 MG/ML
SOLUTION DENTAL
COMMUNITY
Start: 2025-01-07

## 2025-03-20 RX ORDER — HYDROCODONE BITARTRATE AND ACETAMINOPHEN 10; 325 MG/1; MG/1
1 TABLET ORAL EVERY 6 HOURS PRN
COMMUNITY
Start: 2025-01-07

## 2025-03-20 NOTE — PROGRESS NOTES
[facility]        [facility]      [address]  [address]  [place] [address]    PHONE  [phone]          FAX  [phone]    [email or web address]      OFFICE FOLLOW-UP NOTE            Torey Schroeder    : 1955    MRN: [id number]  CSN: [id number]      PCP: Ari Nogueira MD  Referring Provider: No ref. provider found    Insurance: Payor: MEDICARE / Plan: MEDICARE PART B ONLY / Product Type: *No Product type* /           Date of Visit: 3/20/2025    Reason for Visit:   Chief Complaint    Follow - Up                         History of Present Care:  Torey Schroeder is a a(n) 69 year old, male who presents to my office for evaluation.  Patient underwent right L4-5 TLIF and right L2-3 MLD in 2023 with Dr. Mills.  Patient did quite well following intervention.  Over the past 3 weeks patient has endorsed positional radiating right anterior thigh pain.  Patient reports when driving his car and placing pressure on the brake/gas he will experience right anterior thigh pain and more frequently it is becoming pain while even sitting.  Patient denies radiation past the knee.  He denies left lower extremity symptoms, lower extremity weakness, numbness.  He has no bowel or bladder changes or saddle anesthesia.      History:  .  Past Medical History:    Anxiety state    Back problem    Cancer (HCC)    bladder    Closed compression fracture of third lumbar vertebra (HCC)    External hemorrhoids    Hearing impairment    High cholesterol    Osteoarthritis      Past Surgical History:   Procedure Laterality Date    Colonoscopy      Cystoscopy,insert ureteral stent      Hemorrhoidectomy        Family History   Problem Relation Age of Onset    Prostate Cancer Father 68         metastatic dz    Cancer Mother         breast, thyroid    Heart Disorder Mother     Genetic Disease Daughter         retinitis pigmentosa    Alcohol and  Other Disorders Associated Son         autistic    Cancer Maternal Grandmother         leukemia    Cancer Maternal Grandfather         unknown type; smoker    Cancer Sister         lung ca; smoker    Cancer Paternal Cousin Male         unknown type; smoker    Breast Cancer Niece 42      Social History     Socioeconomic History    Marital status:      Spouse name: Not on file    Number of children: Not on file    Years of education: Not on file    Highest education level: Not on file   Occupational History    Not on file   Tobacco Use    Smoking status: Former     Current packs/day: 0.00     Average packs/day: 1 pack/day for 10.0 years (10.0 ttl pk-yrs)     Types: Cigarettes     Start date: 3/1/1980     Quit date: 3/1/1990     Years since quittin.0    Smokeless tobacco: Never   Vaping Use    Vaping status: Some Days    Substances: THC    Devices: Disposable   Substance and Sexual Activity    Alcohol use: Yes     Alcohol/week: 3.0 standard drinks of alcohol     Types: 3 Standard drinks or equivalent per week    Drug use: Yes     Types: Cannabis     Comment: daily    Sexual activity: Not on file   Other Topics Concern    Not on file   Social History Narrative    Not on file     Social Drivers of Health     Food Insecurity: Not on file   Transportation Needs: Not on file   Stress: Not on file   Housing Stability: Not on file        Allergies:  Allergies[1]      Medications:  Current Outpatient Medications   Medication Sig Dispense Refill    amoxicillin 500 MG Oral Tab       chlorhexidine gluconate 0.12 % Mouth/Throat Solution PLACE 15ML IN THE MOUTH BY MUCOUS MEMBRANE TWICE DAILY . HOLD FOR 60 SECONDS THEN SPIT OUT GENTLY      HYDROcodone-acetaminophen  MG Oral Tab Take 1 tablet by mouth every 6 (six) hours as needed for Pain.      ibuprofen 600 MG Oral Tab       sertraline 50 MG Oral Tab Take 1.5 tablets (75 mg total) by mouth daily. 135 tablet 0    rosuvastatin 40 MG Oral Tab Take 1 tablet (40 mg  total) by mouth nightly. 90 tablet 3    CLONAZEPAM 0.5 MG Oral Tab TAKE 1 TABLET(0.5 MG) BY MOUTH DAILY AS NEEDED 30 tablet 1    sennosides 17.2 MG Oral Tab Take 1 tablet (17.2 mg total) by mouth nightly. (Patient not taking: Reported on 1/2/2025) 30 tablet 0    cyanocobalamin 100 MCG Oral Tab Take 1 tablet (100 mcg total) by mouth daily.      Cholecalciferol 25 MCG (1000 UT) Oral Tab Take by mouth daily.          Review of Systems:  A 10-point system was reviewed.  Pertinent positives and negatives are noted in HPI.      Physical Exam:  Ht 67\"   Wt 170 lb (77.1 kg)   BMI 26.63 kg/m²         Neurological Exam:    AAOx3, following commands  PERRLA  EOMI  Face symmetrical  Tongue midline  Hearing symmetrical and intact to finger rub    No rhinorrhea or otorrhea    Romberg negative    Motor Strength:  5 out of 5 in bilateral lower extremities    Sensation to light touch:  Intact in bilateral lower extremities throughout    DTRs:  2+ out of 4 in bilateral lower extremities      Abdomen:  Soft, non-distended, non-tender, with no rebound or guarding.  No peritoneal signs.     Extremities:  Non-tender, no lower extremity edema noted.      Labs:  CBC:  Lab Results   Component Value Date    WBC 9.5 01/03/2025    HGB 14.4 01/03/2025    HCT 42.5 01/03/2025    MCV 85.0 01/03/2025    .0 01/03/2025      BMG:  Lab Results   Component Value Date     01/03/2025    K 4.6 01/03/2025    CO2 29.0 01/03/2025     01/03/2025    BUN 14 01/03/2025      INR:  Lab Results   Component Value Date    INR 1.08 02/03/2023          Diagnostics:  MRI lumbar spine dated March 2025 reviewed:  There is only a sagittal view available.  There is evidence of L4-5 instrumented fusion.  There is multilevel degenerative disc disease throughout the lumbar spine with regional loss of lordosis.  There is evidence of multilevel foraminal stenosis throughout    Diagnosis:  1. Spinal stenosis of lumbar region with neurogenic claudication  -  PHYSICAL THERAPY EXTERNAL    2. Status post lumbar spinal fusion  - PHYSICAL THERAPY EXTERNAL      Assessment/Plan:  Torey Schroeder returns to the office for evaluation.  Patient mainly endorsing right anterior thigh pain consistent with L3 radiculopathy.  His exam is neurologically normal today.  I did review an MRI of the lumbar spine with sagittal views only which makes it difficult to determine laterality.  There is multilevel foraminal stenosis throughout.  I would like patient to initiate physical therapy at this time.  He may return to the office in 4 to 6 weeks for follow-up after completion.  At that time we will consider injection versus updating MRI with full axial views available.      More than 30 minutes were spent during this visit and the coordination of this patient's care. All questions and concerns were addressed. We appreciate the opportunity to participate in the care of this patient. Please do not hesitate to call our office ([phone]) with any issues.    This note has been dictated utilizing voice recognition software. Unfortunately, this may lead to occasional typographical errors. If there are any questions regarding this, please do not hesitate to contact our office.           Andres Calderon PA-C    3/20/2025  12:54 PM         [1] No Known Allergies

## 2025-03-20 NOTE — PATIENT INSTRUCTIONS
Refill policies:    Allow 2-3 business days for refills; controlled substances may take longer.  Contact your pharmacy at least 5 days prior to running out of medication and have them send an electronic request or submit request through the “request refill” option in your [employer] account.  Refills are not addressed on weekends; covering physicians do not authorize routine medications on weekends.  No narcotics or controlled substances are refilled after noon on Fridays or by on call physicians.  By law, narcotics must be electronically prescribed.  A 30 day supply with no refills is the maximum allowed.  If your prescription is due for a refill, you may be due for a follow up appointment.  To best provide you care, patients receiving routine medications need to be seen at least once a year.  Patients receiving narcotic/controlled substance medications need to be seen at least once every 3 months.  In the event that your preferred pharmacy does not have the requested medication in stock (e.g. Backordered), it is your responsibility to find another pharmacy that has the requested medication available.  We will gladly send a new prescription to that pharmacy at your request.    Scheduling Tests:    If your physician has ordered radiology tests such as MRI or CT scans, please contact Central Scheduling at [phone] right away to schedule the test.  Once scheduled, the [facility] Centralized Referral Team will work with your insurance carrier to obtain pre-certification or prior authorization.  Depending on your insurance carrier, approval may take 3-10 days.  It is highly recommended patients assure they have received an authorization before having a test performed.  If test is done without insurance authorization, patient may be responsible for the entire amount billed.      Precertification and Prior Authorizations:  If your physician has recommended that you have a procedure or additional testing performed the [facility]  Centralized Referral Team will contact your insurance carrier to obtain pre-certification or prior authorization.    You are strongly encouraged to contact your insurance carrier to verify that your procedure/test has been approved and is a COVERED benefit.  Although the [facility] Centralized Referral Team does its due diligence, the insurance carrier gives the disclaimer that \"Although the procedure is authorized, this does not guarantee payment.\"    Ultimately the patient is responsible for payment.   Thank you for your understanding in this matter.  Paperwork Completion:  If you require FMLA or disability paperwork for your recovery, please make sure to either drop it off or have it faxed to our office at [phone]. Be sure the form has your name and date of birth on it.  The form will be faxed to our Forms Department and they will complete it for you.  There is a 25$ fee for all forms that need to be filled out.  Please be aware there is a 10-14 day turnaround time.  You will need to sign a release of information (TORI) form if your paperwork does not come with one.  You may call the Forms Department with any questions at [phone].  Their fax number is [phone].

## 2025-03-21 ENCOUNTER — TELEPHONE (OUTPATIENT)
Facility: CLINIC | Age: 70
End: 2025-03-21

## 2025-03-21 NOTE — TELEPHONE ENCOUNTER
Patient brought imaging disc from [facility] for Andres Calderon PA-C to review.    03/19/2025 - MRI Lumbar WO Contrast    Film uploaded to PACS.  Disc returned to the patient.

## 2025-03-24 NOTE — TELEPHONE ENCOUNTER
High interaction between ibuprofen and sertraline  Mychart to patient     Juan Jose Lema,  We received a refill request for your sertraline. Ibuprofen 600 mg is currently listed on your medication list. Is this something you are consistently taking? We wanted to check since taking both of these medications together can potentially cause increased risk of bleeding. Please let us know. Thank you!  Take care,  EMA nursing

## 2025-04-07 NOTE — TELEPHONE ENCOUNTER
Refill sent by  on 4/7/25.    Current refill request refused due to refill is either a duplicate request or has active refills at the pharmacy.  Check previous templates.    Requested Prescriptions     Refused Prescriptions Disp Refills    sertraline 50 MG Oral Tab [Pharmacy Med Name: SERTRALINE 50MG TABLETS] 135 tablet 3     Sig: TAKE 1 AND 1/2 TABLETS(75 MG) BY MOUTH DAILY     Refused By: FREDI MERIDA     Reason for Refusal: Duplicate refill request

## 2025-05-20 ENCOUNTER — TELEPHONE (OUTPATIENT)
Dept: INTERNAL MEDICINE CLINIC | Facility: CLINIC | Age: 70
End: 2025-05-20

## 2025-05-20 ENCOUNTER — OFFICE VISIT (OUTPATIENT)
Dept: INTERNAL MEDICINE CLINIC | Facility: CLINIC | Age: 70
End: 2025-05-20

## 2025-05-20 ENCOUNTER — LAB ENCOUNTER (OUTPATIENT)
Dept: LAB | Age: 70
End: 2025-05-20
Attending: INTERNAL MEDICINE
Payer: MEDICARE

## 2025-05-20 VITALS
TEMPERATURE: 98 F | OXYGEN SATURATION: 98 % | HEART RATE: 61 BPM | WEIGHT: 174 LBS | HEIGHT: 67 IN | DIASTOLIC BLOOD PRESSURE: 68 MMHG | BODY MASS INDEX: 27.31 KG/M2 | SYSTOLIC BLOOD PRESSURE: 118 MMHG

## 2025-05-20 DIAGNOSIS — C67.9 MALIGNANT NEOPLASM OF URINARY BLADDER, UNSPECIFIED SITE (HCC): ICD-10-CM

## 2025-05-20 DIAGNOSIS — Z00.00 ROUTINE HEALTH MAINTENANCE: ICD-10-CM

## 2025-05-20 DIAGNOSIS — R97.20 ELEVATED PSA: ICD-10-CM

## 2025-05-20 DIAGNOSIS — I45.10 INCOMPLETE RBBB: ICD-10-CM

## 2025-05-20 DIAGNOSIS — I70.0 ATHEROSCLEROSIS OF ABDOMINAL AORTA: ICD-10-CM

## 2025-05-20 DIAGNOSIS — F41.9 ANXIETY DISORDER, UNSPECIFIED TYPE: ICD-10-CM

## 2025-05-20 DIAGNOSIS — I25.10 CORONARY ARTERY CALCIFICATION: ICD-10-CM

## 2025-05-20 DIAGNOSIS — E78.5 HYPERLIPIDEMIA, UNSPECIFIED HYPERLIPIDEMIA TYPE: Primary | ICD-10-CM

## 2025-05-20 DIAGNOSIS — E78.5 HYPERLIPIDEMIA, UNSPECIFIED HYPERLIPIDEMIA TYPE: ICD-10-CM

## 2025-05-20 LAB
ANION GAP SERPL CALC-SCNC: 7 MMOL/L (ref 0–18)
BILIRUB UR QL: NEGATIVE
BILIRUB UR QL: NEGATIVE
BUN BLD-MCNC: 14 MG/DL (ref 9–23)
BUN/CREAT SERPL: 16.9 (ref 10–20)
CALCIUM BLD-MCNC: 9.5 MG/DL (ref 8.7–10.4)
CHLORIDE SERPL-SCNC: 102 MMOL/L (ref 98–112)
CHOLEST SERPL-MCNC: 166 MG/DL (ref ?–200)
CLARITY UR: CLEAR
CLARITY UR: CLEAR
CO2 SERPL-SCNC: 28 MMOL/L (ref 21–32)
CREAT BLD-MCNC: 0.83 MG/DL (ref 0.7–1.3)
EGFRCR SERPLBLD CKD-EPI 2021: 95 ML/MIN/1.73M2 (ref 60–?)
FASTING PATIENT LIPID ANSWER: YES
FASTING STATUS PATIENT QL REPORTED: YES
GLUCOSE BLD-MCNC: 83 MG/DL (ref 70–99)
GLUCOSE UR-MCNC: NORMAL MG/DL
GLUCOSE UR-MCNC: NORMAL MG/DL
HDLC SERPL-MCNC: 50 MG/DL (ref 40–59)
HGB UR QL STRIP.AUTO: NEGATIVE
HGB UR QL STRIP.AUTO: NEGATIVE
KETONES UR-MCNC: NEGATIVE MG/DL
KETONES UR-MCNC: NEGATIVE MG/DL
LDLC SERPL CALC-MCNC: 96 MG/DL (ref ?–100)
LEUKOCYTE ESTERASE UR QL STRIP.AUTO: NEGATIVE
LEUKOCYTE ESTERASE UR QL STRIP.AUTO: NEGATIVE
NITRITE UR QL STRIP.AUTO: NEGATIVE
NITRITE UR QL STRIP.AUTO: NEGATIVE
NONHDLC SERPL-MCNC: 116 MG/DL (ref ?–130)
OSMOLALITY SERPL CALC.SUM OF ELEC: 284 MOSM/KG (ref 275–295)
PH UR: 6 [PH] (ref 5–8)
PH UR: 6 [PH] (ref 5–8)
POTASSIUM SERPL-SCNC: 4.5 MMOL/L (ref 3.5–5.1)
PROT UR-MCNC: NEGATIVE MG/DL
PROT UR-MCNC: NEGATIVE MG/DL
PSA SERPL-MCNC: 3.41 NG/ML (ref ?–4)
SODIUM SERPL-SCNC: 137 MMOL/L (ref 136–145)
SP GR UR STRIP: 1.02 (ref 1–1.03)
SP GR UR STRIP: 1.02 (ref 1–1.03)
TRIGL SERPL-MCNC: 109 MG/DL (ref 30–149)
UROBILINOGEN UR STRIP-ACNC: NORMAL
UROBILINOGEN UR STRIP-ACNC: NORMAL
VLDLC SERPL CALC-MCNC: 18 MG/DL (ref 0–30)

## 2025-05-20 PROCEDURE — 80061 LIPID PANEL: CPT

## 2025-05-20 PROCEDURE — 81003 URINALYSIS AUTO W/O SCOPE: CPT

## 2025-05-20 PROCEDURE — 80048 BASIC METABOLIC PNL TOTAL CA: CPT

## 2025-05-20 PROCEDURE — 36415 COLL VENOUS BLD VENIPUNCTURE: CPT

## 2025-05-20 PROCEDURE — 84153 ASSAY OF PSA TOTAL: CPT

## 2025-05-20 PROCEDURE — 99214 OFFICE O/P EST MOD 30 MIN: CPT | Performed by: INTERNAL MEDICINE

## 2025-05-20 RX ORDER — ROSUVASTATIN CALCIUM 40 MG/1
40 TABLET, COATED ORAL NIGHTLY
Qty: 90 TABLET | Refills: 1 | Status: SHIPPED | OUTPATIENT
Start: 2025-05-20

## 2025-05-20 RX ORDER — CLONAZEPAM 0.5 MG/1
0.5 TABLET ORAL
Qty: 30 TABLET | Refills: 1 | Status: SHIPPED | OUTPATIENT
Start: 2025-05-20

## 2025-05-20 NOTE — PROGRESS NOTES
Torey Schroeder is a 70 year old male.  HPI:     Chief Complaint   Patient presents with    Follow - Up      Torey is here for 6-month follow-up.    Feels well.  No unusual complaints.    It looks like his last colonoscopy was around 2015 so I did give him Dr. Aleksandar Hernandez's name for updated colonoscopy.  He is asymptomatic.    He does follow with urology.  Dr. Madrid.  Last PSA in February of this year was 4.63.  He will be getting another PSA coming up soon.    He did have a coronary artery calcium score of 400.  He did see Dr. Savage from cardiology October 2023.  He is asymptomatic.  Will get updated lipids.  He is on statin.    He does have a history of bladder cancer.  He will next He Dr. Madrid June 2.    He had PCV 20 vaccine February 2023.  Shingrix x 2.  RSV vaccine.  Current Medications[1]   Past Medical History[2]   Social History:  Short Social Hx on File[3]     REVIEW OF SYSTEMS:   GENERAL HEALTH:  feels well otherwise  RESPIRATORY:  Voices no shortness of breath with exertion or cough  CARDIOVASCULAR:  Voices no chest pain on exertion or shortness of breath  GI:   Voices no abdominal pain or changes of bowels   :Viices no urning or frequency of urination.  NEURO:  Voices no  headaches or dizziness    EXAM:   /68   Pulse 61   Temp 98 °F (36.7 °C)   Ht 5' 7\" (1.702 m)   Wt 174 lb (78.9 kg)   SpO2 98%   BMI 27.25 kg/m²     GENERAL:  well developed, well nourished, in no apparent distress  SKIN:  no rashes ,   HEENT: atraumatic.  TM's normal. Canals clear.  Pharynx normal without exudate.  EYES:  PERRL. Sclera anicteric.  NECK:  Supple,  no adenopathy,  thyroid normal  LUNGS:  clear to auscultation.  Effort normal  CARDIO:  RRR without murmur.   S1 and S2 normal  GI:  good BS's,  no masses,   HSM or tenderness  EXTREMITIES : no cyanosis, clubbing or edema    ASSESSMENT AND PLAN:     1. Hyperlipidemia, unspecified hyperlipidemia type  Lipidemia.  Will update lipids.  LDL goal should be around  70.  - Basic Metabolic Panel (8); Future  - Lipid Panel; Future    2. Coronary artery calcification  Coronary artery calcification.  He has seen Dr. Savage.  I did review Dr. Savage's note from October 25, 2023.  At that time there was no indication for stress testing.  - Basic Metabolic Panel (8); Future  - Lipid Panel; Future    3. Incomplete RBBB  Incomplete right bundle branch block.  Stable.  Asymptomatic.    4. Atherosclerosis of abdominal aorta  Atherosclerosis of abdominal aorta.  On statin.    5. Routine health maintenance  See above for vaccine discussion.    6. Malignant neoplasm of urinary bladder, unspecified site (HCC)  He follows with Dr. Madrid for his bladder cancer.  He has upcoming appointment.    7. Anxiety disorder, unspecified type  He takes clonazepam as needed.  No acute new issues.  - clonazePAM 0.5 MG Oral Tab; Take 1 tablet (0.5 mg total) by mouth daily as needed.  Dispense: 30 tablet; Refill: 1    This visit was 30 minutes.  I spent 10 minutes before visit preparing and reviewing old records.  Greater than 50% of the visit was engaged in counseling and review of past data.    He does have an appointment with a primary care physician out of [facility] in 1 month to establish care as it relates to me retiring.    The patient indicates understanding of these issues and agrees to the plan.    Ari Nogueira MD  5/20/2025  4:30 PM       [1]   Current Outpatient Medications   Medication Sig Dispense Refill    clonazePAM 0.5 MG Oral Tab Take 1 tablet (0.5 mg total) by mouth daily as needed. 30 tablet 1    rosuvastatin 40 MG Oral Tab Take 1 tablet (40 mg total) by mouth nightly. 90 tablet 1    sertraline 50 MG Oral Tab Take 1.5 tablets (75 mg total) by mouth daily. 135 tablet 1    cyanocobalamin 100 MCG Oral Tab Take 1 tablet (100 mcg total) by mouth daily.      Cholecalciferol 25 MCG (1000 UT) Oral Tab Take by mouth daily.     [2]   Past Medical History:   Anxiety state    Back problem     Cancer (HCC)    bladder    Closed compression fracture of third lumbar vertebra (HCC)    External hemorrhoids    Hearing impairment    High cholesterol    Osteoarthritis   [3]   Social History  Socioeconomic History    Marital status:    Tobacco Use    Smoking status: Former     Current packs/day: 0.00     Average packs/day: 1 pack/day for 10.0 years (10.0 ttl pk-yrs)     Types: Cigarettes     Start date: 3/1/1980     Quit date: 3/1/1990     Years since quittin.2    Smokeless tobacco: Never   Vaping Use    Vaping status: Some Days    Substances: THC    Devices: Disposable   Substance and Sexual Activity    Alcohol use: Yes     Alcohol/week: 3.0 standard drinks of alcohol     Types: 3 Standard drinks or equivalent per week    Drug use: Yes     Types: Cannabis     Comment: daily

## 2025-05-28 ENCOUNTER — NURSE ONLY (OUTPATIENT)
Facility: CLINIC | Age: 70
End: 2025-05-28

## 2025-05-28 DIAGNOSIS — Z12.11 COLON CANCER SCREENING: Primary | ICD-10-CM

## 2025-05-28 NOTE — PROGRESS NOTES
GI Staff:  TCS Colon Screening Orders    Please schedule: Colonoscopy 73257 with MAC only (Marijuana use)    Please send split dose Golytely bowel prep     Diagnosis: Colon Screening Z12.11 / History of Colon polyps  Z86.010 /     Medication adjustments:  Hold Vitamins for 2 weeks prior to the procedure.      >>>Please inform patient if new medications are started after scheduling procedure they need to call clinic to notify us.

## 2025-05-28 NOTE — PROGRESS NOTES
Meets TCS criteria and appropriate to proceed with scheduling  Medications, pharmacy, and allergies reviewed.   Age 45-76 y/o:   › MD preference: Dr Hernandez  › Insurance:  MEDICARE  › Last pcp visit: 5/20/2025  › Last CBC: 1/3/2025  › Last FOBT/Cologuard (if applicable):N/A  › H/W: 5'7\"/174 lb  › BMI: 27.25    Special comments/notes:  Recall    Last Procedure, Date, MD:   11/5/2015, Yoshi Marrero   Last diagnosis: -Inflamed Internal Hemorrhoids  -Mild diverticulosis of the sigmoid colon  -Small rectal polyp  -Mild patchy erosive gastritis   Recalled for (mth/yrs):    Sedation used previously: IV Sedation   Last Prep Used:    Quality of Prep:      Telephone Colon Screening Questionnaire Yes No   Are you currently experiencing any GI symptoms [] [x]   If yes, explain     Rectal bleeding [] [x]   Black stool [] [x]   Dysphagia or food \"feeling stuck\" when eating [] [x]   Intractable vomiting [] [x]   Unexplained weight loss [] [x]   First colonoscopy [] [x]   Family history of colon cancer [] [x]   Any issues with anesthesia [] [x]   If yes, explain      Any recent complaints related to chest pain &/or shortness of breath [] [x]   Referred to a cardiologist?  [] [x]   If yes, explain      History of  respiratory issues/oxygen/AMANDA/COPD [] [x]   CPAP/BiPAP     History of devices (pacemaker/defibrillator) [] [x]   History of heart attack &/or stroke [] [x]   If yes, in the last 12 months?   Stent placement?  [] [x]     Medication Reconciliation  Yes  No   Anticoagulation (blood thinners)  [] [x]   Diuretics  [] [x]   ACE/ARB's  [] [x]   Diabetic medication (oral/insulin)  [] [x]   Weight loss medication (phentermine/vyvanse/saxsenda) [] [x]   Iron/vitamin E/herbal/multivitamin supplements [x] []   Usage of marijuana/CBD/vape products Marijuana [x] []

## 2025-05-29 NOTE — PROGRESS NOTES
Scheduled for:  Colonoscopy 18210   Location:  [facility] ([facility])     Provider: Aleksandar Hernandez MD    Date:  8/28/2025 Thursday  Time:   12:30 pm  (Patient made aware will receive phone call the day before with an arrival time)    Sedation:  MAC  Prep:  Split GoLytely    Diagnosis with codes:    ICD-10-CM   1. Colon cancer screening  Z12.11        Meds/Allergies Reconciled?:   Provider Reviewed   Was patient informed to call insurance with codes (Y/N):  Yes  Referral sent?: Referral was sent at the time of electronic surgical scheduling.  [facility] or [facility] notified?: I sent an electronic request to [facility] Scheduling and received a confirmation today.     Medication Orders:  Patient is aware to NOT take iron pills, herbal meds and diet supplements for 7 days before exam. Also to NOT take any form of alcohol, recreational drugs and any forms of ED meds 24 hours before exam.     Misc Orders:  N/A   Further instructions given by staff:  I Provided prep instructions to patient and reviewed date, time and location. Patient verbalized that  He / His understood and is aware to call with any questions.  Patient was informed about the new cancellation policy for He / His procedure. Patient was also given copy of the cancellation policy at the time of the appointment and verbalized understanding.

## 2025-06-02 ENCOUNTER — PROCEDURE (OUTPATIENT)
Dept: SURGERY | Facility: CLINIC | Age: 70
End: 2025-06-02
Payer: MEDICARE

## 2025-06-02 VITALS — RESPIRATION RATE: 16 BRPM | HEART RATE: 64 BPM | SYSTOLIC BLOOD PRESSURE: 125 MMHG | DIASTOLIC BLOOD PRESSURE: 73 MMHG

## 2025-06-02 DIAGNOSIS — C67.9 MALIGNANT NEOPLASM OF URINARY BLADDER, UNSPECIFIED SITE (HCC): Primary | ICD-10-CM

## 2025-06-02 DIAGNOSIS — R97.20 ELEVATED PSA: ICD-10-CM

## 2025-06-02 RX ORDER — CIPROFLOXACIN 500 MG/1
500 TABLET, FILM COATED ORAL ONCE
Status: COMPLETED | OUTPATIENT
Start: 2025-06-02 | End: 2025-06-02

## 2025-06-02 RX ADMIN — CIPROFLOXACIN 500 MG: 500 TABLET, FILM COATED ORAL at 14:00:00

## 2025-06-02 NOTE — PROGRESS NOTES
Torey Schroeder is a 70 year old male.    HPI:     Chief Complaint   Patient presents with    Bladder Cancer     Pt here today for office cystocopy procedure for bladder tumor surveilance       70-year-old male presents for office cystoscopy.  Has a history of high-grade noninvasive bladder cancer diagnosed originally at [facility] low-grade noninvasive May 2022.  Transferred his urologic care subsequently to [facility].  Recurrence experienced 2024 with high-grade papillary cancer without tumor invasion into the underlying lamina propria.  He could not tolerate BCG due to side effects.  Treated with gemcitabine instillation x 6.  Last surveillance cystoscopy 2025 unremarkable.  Denies any gross hematuria or dysuria.  CT urogram December 3, 2024 demonstrated no abnormalities.  PSA noted to be elevated at 4.0 2025.  This has improved on follow-up testing to 3.41 May 20, 2025.  Urinalysis with microscopy May 20, 2025 unremarkable.      HISTORY:  Past Medical History[1]   Past Surgical History[2]   Family History[3]   Social History: Short Social Hx on File[4]     Medications (Active prior to today's visit):  Current Medications[5]    Allergies:  Allergies[6]      ROS:       PHYSICAL EXAM:   Torey Schroeder  : 1955  Referring Physician: No ref. provider found     Chief Complaint   Patient presents with    Bladder Cancer     Pt here today for office cystocopy procedure for bladder tumor surveilance           CYSTOSCOPY    Anesthesia:  2% lidocaine gel    Urethra: Normal  Prostate / Pelvic: Normal  Bladder: Normal.  No tumor, stone, diverticulum, or glomerulation  U.O's: Normal  Trabeculation: grade 2-3      POST CYSTOSCOPY MEDICATIONS: sample one tablet Cipro 500mg given to patient    DIAGNOSIS:     PLAN: s ee below       ASSESSMENT/PLAN:   Assessment   Encounter Diagnoses   Name Primary?    Malignant neoplasm of urinary bladder, unspecified site (HCC) Yes    Elevated  PSA      recommend:  - Follow-up on urine for cytology.  - Return to clinic in 3 months for repeat office cystoscopy.           Orders This Visit:  No orders of the defined types were placed in this encounter.      Meds This Visit:  Requested Prescriptions      No prescriptions requested or ordered in this encounter       Imaging & Referrals:  None     2025  Lynda Madrid MD               [1]   Past Medical History:   Anxiety state    Back problem    Cancer (HCC)    bladder    Closed compression fracture of third lumbar vertebra (HCC)    External hemorrhoids    Hearing impairment    High cholesterol    Osteoarthritis   [2]   Past Surgical History:  Procedure Laterality Date    Colonoscopy      Cystoscopy,insert ureteral stent      Hemorrhoidectomy     [3]   Family History  Problem Relation Age of Onset    Prostate Cancer Father 68         metastatic dz    Cancer Mother         breast, thyroid    Heart Disorder Mother     Genetic Disease Daughter         retinitis pigmentosa    Alcohol and Other Disorders Associated Son         autistic    Cancer Maternal Grandmother         leukemia    Cancer Maternal Grandfather         unknown type; smoker    Cancer Sister         lung ca; smoker    Cancer Paternal Cousin Male         unknown type; smoker    Breast Cancer Niece 42   [4]   Social History  Socioeconomic History    Marital status:    Tobacco Use    Smoking status: Former     Current packs/day: 0.00     Average packs/day: 1 pack/day for 10.0 years (10.0 ttl pk-yrs)     Types: Cigarettes     Start date: 3/1/1980     Quit date: 3/1/1990     Years since quittin.2    Smokeless tobacco: Never   Vaping Use    Vaping status: Some Days    Substances: THC    Devices: Disposable   Substance and Sexual Activity    Alcohol use: Yes     Alcohol/week: 3.0 standard drinks of alcohol     Types: 3 Standard drinks or equivalent per week    Drug use: Yes     Types: Cannabis     Comment: daily   [5]   Current  Outpatient Medications   Medication Sig Dispense Refill    rosuvastatin 40 MG Oral Tab Take 1 tablet (40 mg total) by mouth nightly. 90 tablet 1    sertraline 50 MG Oral Tab Take 1.5 tablets (75 mg total) by mouth daily. 135 tablet 1    cyanocobalamin 100 MCG Oral Tab Take 1 tablet (100 mcg total) by mouth in the morning.      Cholecalciferol 25 MCG (1000 UT) Oral Tab Take by mouth in the morning.      clonazePAM 0.5 MG Oral Tab Take 1 tablet (0.5 mg total) by mouth daily as needed. (Patient not taking: Reported on 6/2/2025) 30 tablet 1   [6] No Known Allergies

## 2025-06-03 LAB — NON GYNE INTERPRETATION: NEGATIVE

## 2025-06-26 ENCOUNTER — APPOINTMENT (OUTPATIENT)
Dept: URBAN - METROPOLITAN AREA CLINIC 244 | Age: 70
Setting detail: DERMATOLOGY
End: 2025-06-28

## 2025-06-26 DIAGNOSIS — B35.3 TINEA PEDIS: ICD-10-CM

## 2025-06-26 DIAGNOSIS — Z12.83 ENCOUNTER FOR SCREENING FOR MALIGNANT NEOPLASM OF SKIN: ICD-10-CM

## 2025-06-26 DIAGNOSIS — L82.1 OTHER SEBORRHEIC KERATOSIS: ICD-10-CM

## 2025-06-26 DIAGNOSIS — L57.0 ACTINIC KERATOSIS: ICD-10-CM

## 2025-06-26 DIAGNOSIS — D22 MELANOCYTIC NEVI: ICD-10-CM

## 2025-06-26 DIAGNOSIS — L81.4 OTHER MELANIN HYPERPIGMENTATION: ICD-10-CM

## 2025-06-26 PROBLEM — D48.5 NEOPLASM OF UNCERTAIN BEHAVIOR OF SKIN: Status: ACTIVE | Noted: 2025-06-26

## 2025-06-26 PROBLEM — D22.9 MELANOCYTIC NEVI, UNSPECIFIED: Status: ACTIVE | Noted: 2025-06-26

## 2025-06-26 PROCEDURE — OTHER BIOPSY BY SHAVE METHOD: OTHER

## 2025-06-26 PROCEDURE — 99213 OFFICE O/P EST LOW 20 MIN: CPT | Mod: 25

## 2025-06-26 PROCEDURE — OTHER DIAGNOSIS COMMENT: OTHER

## 2025-06-26 PROCEDURE — OTHER PRESCRIPTION MEDICATION MANAGEMENT: OTHER

## 2025-06-26 PROCEDURE — OTHER COUNSELING: OTHER

## 2025-06-26 PROCEDURE — OTHER LIQUID NITROGEN: OTHER

## 2025-06-26 PROCEDURE — 11102 TANGNTL BX SKIN SINGLE LES: CPT

## 2025-06-26 PROCEDURE — OTHER MIPS QUALITY: OTHER

## 2025-06-26 PROCEDURE — 17003 DESTRUCT PREMALG LES 2-14: CPT

## 2025-06-26 PROCEDURE — OTHER OTC TREATMENT REGIMEN: OTHER

## 2025-06-26 PROCEDURE — 17000 DESTRUCT PREMALG LESION: CPT | Mod: 59

## 2025-06-26 ASSESSMENT — LOCATION SIMPLE DESCRIPTION DERM
LOCATION SIMPLE: SCALP
LOCATION SIMPLE: ANTERIOR SCALP
LOCATION SIMPLE: SUPERIOR FOREHEAD
LOCATION SIMPLE: LEFT PLANTAR SURFACE
LOCATION SIMPLE: ABDOMEN
LOCATION SIMPLE: RIGHT PLANTAR SURFACE

## 2025-06-26 ASSESSMENT — LOCATION ZONE DERM
LOCATION ZONE: FACE
LOCATION ZONE: FEET
LOCATION ZONE: SCALP
LOCATION ZONE: TRUNK

## 2025-06-26 ASSESSMENT — LOCATION DETAILED DESCRIPTION DERM
LOCATION DETAILED: SUPERIOR MID FOREHEAD
LOCATION DETAILED: RIGHT PLANTAR FOREFOOT OVERLYING 3RD METATARSAL
LOCATION DETAILED: LEFT SUPERIOR PARIETAL SCALP
LOCATION DETAILED: EPIGASTRIC SKIN
LOCATION DETAILED: LEFT PLANTAR FOREFOOT OVERLYING 3RD METATARSAL
LOCATION DETAILED: MID-FRONTAL SCALP

## 2025-07-27 PROBLEM — R97.20 ELEVATED PSA: Status: ACTIVE | Noted: 2025-06-11

## 2025-07-27 PROBLEM — R93.1 ELEVATED CORONARY ARTERY CALCIUM SCORE: Status: ACTIVE | Noted: 2025-06-11

## 2025-07-27 PROBLEM — E78.5 HYPERLIPIDEMIA: Status: ACTIVE | Noted: 2022-10-20

## 2025-07-27 PROBLEM — Z85.51 HISTORY OF BLADDER CANCER: Status: ACTIVE | Noted: 2025-06-11

## 2025-07-27 PROBLEM — Z00.00 ROUTINE HEALTH MAINTENANCE: Status: ACTIVE | Noted: 2025-07-27

## 2025-07-27 PROBLEM — I45.10 INCOMPLETE RBBB: Status: ACTIVE | Noted: 2022-10-20

## 2025-07-27 PROBLEM — M48.062 SPINAL STENOSIS, LUMBAR REGION WITH NEUROGENIC CLAUDICATION: Status: ACTIVE | Noted: 2023-01-31

## 2025-07-27 RX ORDER — CLONAZEPAM 0.5 MG/1
0.5 TABLET ORAL DAILY PRN
COMMUNITY
Start: 2025-05-20

## 2025-07-27 RX ORDER — ROSUVASTATIN CALCIUM 40 MG/1
40 TABLET, COATED ORAL DAILY
COMMUNITY
Start: 2025-05-20

## 2025-07-31 ENCOUNTER — APPOINTMENT (OUTPATIENT)
Age: 70
End: 2025-07-31

## 2025-07-31 VITALS
BODY MASS INDEX: 27.39 KG/M2 | HEIGHT: 67 IN | TEMPERATURE: 98.4 F | DIASTOLIC BLOOD PRESSURE: 71 MMHG | OXYGEN SATURATION: 97 % | SYSTOLIC BLOOD PRESSURE: 106 MMHG | WEIGHT: 174.49 LBS | HEART RATE: 72 BPM

## 2025-07-31 DIAGNOSIS — F41.1 ANXIETY STATE: ICD-10-CM

## 2025-07-31 DIAGNOSIS — E78.00 PURE HYPERCHOLESTEROLEMIA: Primary | ICD-10-CM

## 2025-07-31 DIAGNOSIS — R93.1 ELEVATED CORONARY ARTERY CALCIUM SCORE: ICD-10-CM

## 2025-07-31 DIAGNOSIS — Z00.00 ROUTINE HEALTH MAINTENANCE: ICD-10-CM

## 2025-07-31 DIAGNOSIS — R97.20 ELEVATED PSA: ICD-10-CM

## 2025-07-31 DIAGNOSIS — M48.062 SPINAL STENOSIS, LUMBAR REGION WITH NEUROGENIC CLAUDICATION: ICD-10-CM

## 2025-07-31 DIAGNOSIS — Z85.51 HISTORY OF BLADDER CANCER: ICD-10-CM

## 2025-07-31 RX ORDER — ASPIRIN 81 MG/1
81 TABLET ORAL DAILY
COMMUNITY
Start: 2025-07-31

## 2025-07-31 RX ORDER — EZETIMIBE 10 MG/1
10 TABLET ORAL DAILY
Qty: 90 TABLET | Refills: 3 | Status: SHIPPED | OUTPATIENT
Start: 2025-07-31

## 2025-07-31 ASSESSMENT — PATIENT HEALTH QUESTIONNAIRE - PHQ9
CLINICAL INTERPRETATION OF PHQ2 SCORE: NO FURTHER SCREENING NEEDED
SUM OF ALL RESPONSES TO PHQ9 QUESTIONS 1 AND 2: 0
2. FEELING DOWN, DEPRESSED OR HOPELESS: NOT AT ALL
SUM OF ALL RESPONSES TO PHQ9 QUESTIONS 1 AND 2: 0
1. LITTLE INTEREST OR PLEASURE IN DOING THINGS: NOT AT ALL

## 2025-07-31 ASSESSMENT — ACTIVITIES OF DAILY LIVING (ADL)
SENSORY_SUPPORT_DEVICES: EYEGLASSES
PILL BOX USED: NO
NEEDS_ASSIST: NO
NEEDS ASSISTANCE WITH FOOD: INDEPENDENT
USING TELEPHONE: INDEPENDENT
GROCERY SHOPPING: INDEPENDENT
STIL DRIVING: YES
ADL_SHORT_OF_BREATH: NO
RECENT_DECLINE_ADL: NO
USING TRANSPORTATION: INDEPENDENT
MANAGING FINANCES: INDEPENDENT
EATING: INDEPENDENT
DOING HOUSEWORK: INDEPENDENT
TAKING MEDICATION: INDEPENDENT
PREPARING MEALS: INDEPENDENT

## 2025-07-31 ASSESSMENT — MINI COG
TOTAL SCORE: 5
PATIENT ABLE TO REPEAT THE 3 WORDS GIVEN PREVIOUSLY?: WAS ABLE TO REPEAT BACK 3 WORDS CORRECTLY
PATIENT WAS GIVEN REPEAT BACK WORDS FROM VERSION: 1 - BANANA SUNRISE CHAIR
PATIENT ABLE TO FILL IN THE CLOCK FACE WITH 10 MINUTES PAST 11 O'CLOCK?: YES, CLOCK IS CORRECT

## 2025-07-31 ASSESSMENT — COGNITIVE AND FUNCTIONAL STATUS - GENERAL
DO YOU HAVE DIFFICULTY DRESSING OR BATHING: NO
BECAUSE OF A PHYSICAL, MENTAL, OR EMOTIONAL CONDITION, DO YOU HAVE DIFFICULTY DOING ERRANDS ALONE: NO
BECAUSE OF A PHYSICAL, MENTAL, OR EMOTIONAL CONDITION, DO YOU HAVE SERIOUS DIFFICULTY CONCENTRATING, REMEMBERING OR MAKING DECISIONS: NO
DO YOU HAVE SERIOUS DIFFICULTY WALKING OR CLIMBING STAIRS: NO

## 2025-08-19 PROBLEM — E78.00 PURE HYPERCHOLESTEROLEMIA: Status: ACTIVE | Noted: 2025-08-19

## 2025-08-19 PROBLEM — I25.10 CORONARY ARTERY CALCIFICATION: Status: ACTIVE | Noted: 2023-10-23

## 2025-08-19 PROBLEM — R07.2 PRECORDIAL PAIN: Status: ACTIVE | Noted: 2025-08-19

## 2025-08-19 PROBLEM — K57.30 DIVERTICULOSIS OF COLON: Status: ACTIVE | Noted: 2025-08-19

## 2025-08-20 ENCOUNTER — TELEPHONE (OUTPATIENT)
Facility: CLINIC | Age: 70
End: 2025-08-20

## (undated) DEVICE — SUT VICRYL 0 CT-2 J727D

## (undated) DEVICE — SPK10186 WILSON TABLE KIT: Brand: SPK10186 WILSON TABLE KIT

## (undated) DEVICE — KIT ACCESS MAXCESS 4

## (undated) DEVICE — ENCORE® LATEX MICRO SIZE 8, STERILE LATEX POWDER-FREE SURGICAL GLOVE: Brand: ENCORE

## (undated) DEVICE — SLEEVE COMPR MD KNEE LEN SGL USE KENDALL SCD

## (undated) DEVICE — BIPOLAR SEALER 23-112-1 AQM 6.0: Brand: AQUAMANTYS™

## (undated) DEVICE — DISPOSABLE SLIM BIPOLAR FORCEPS, NON-STICK,: Brand: SPETZLER-MALIS

## (undated) DEVICE — APPLICATOR CHLORAPREP 26ML

## (undated) DEVICE — Device

## (undated) DEVICE — GAMMEX® PI HYBRID SIZE 8, STERILE POWDER-FREE SURGICAL GLOVE, POLYISOPRENE AND NEOPRENE BLEND: Brand: GAMMEX

## (undated) DEVICE — SOL NACL IRRIG 0.9% 1000ML BTL

## (undated) DEVICE — SNAP KOVER: Brand: UNBRANDED

## (undated) DEVICE — SOLUTION IRRIG 1000ML ST H2O AQUALITE PLAS

## (undated) DEVICE — LAMINECTOMY: Brand: MEDLINE INDUSTRIES, INC.

## (undated) DEVICE — DRAPE SRG 90X60IN BCK TBL CVR

## (undated) DEVICE — SOLUTION IRRIG 3000ML 0.9% NACL FLX CONT

## (undated) DEVICE — GAMMEX® PI HYBRID SIZE 7.5, STERILE POWDER-FREE SURGICAL GLOVE, POLYISOPRENE AND NEOPRENE BLEND: Brand: GAMMEX

## (undated) DEVICE — WRAP COOLING BACK W/NO PILLOW

## (undated) DEVICE — SUT VICRYL 0 CT-1 J470D

## (undated) DEVICE — DRAPE SHEET LARGE 76X55

## (undated) DEVICE — SUT MONOCRYL 4-0 PS-2 Y496G

## (undated) DEVICE — C-ARMOR C-ARM EQUIPMENT COVERS CLEAR STERILE UNIVERSAL FIT 12 PER CASE: Brand: C-ARMOR

## (undated) DEVICE — SUT VICRYL 3-0 CP-2 J761D

## (undated) DEVICE — APPX 0.91 ML, GRADUATED ADAPTER WITH CLAVE®: Brand: ICU MEDICAL

## (undated) DEVICE — PEN: MARKING STD PT 100/CS: Brand: MEDICAL ACTION INDUSTRIES

## (undated) DEVICE — PAD,NON-ADHERENT,3X8,STERILE,LF,1/PK: Brand: MEDLINE

## (undated) DEVICE — CYSTO PACK: Brand: MEDLINE INDUSTRIES, INC.

## (undated) DEVICE — E-Z BUTTON SWITCH PENCIL

## (undated) DEVICE — FLOSEAL HEMOSTATIC MATRIX, 5ML: Brand: FLOSEAL HEMOSTATIC MATRIX

## (undated) DEVICE — SOLUTION IRRIG 1000ML 0.9% NACL USP BTL

## (undated) DEVICE — CAUTERY TIP BLADE EXTENSION

## (undated) DEVICE — PRECISION MATCH HEAD

## (undated) DEVICE — WIRE FX PRCPT KRSH BVL BLNT

## (undated) DEVICE — SOL  0.9% 1000ML

## (undated) DEVICE — MICRO KOVER: Brand: UNBRANDED

## (undated) DEVICE — CLOSURE EXOFIN 1.0ML

## (undated) DEVICE — NV I-PAS III BEVELED TIP STER

## (undated) NOTE — LETTER
[address] [facility]   Date:   12/8/2022     Name:   Jen Roblero    YOB: 1955   MRN:   [id number]       WHERE IS YOUR PAIN NOW? Cori the areas on your body where you feel the described sensations. Use the appropriate symbol. Leonor Showers the areas of radiation. Include all affected areas. Just to complete the picture, please draw in the face. ACHE:  ^ ^ ^   NUMBNESS:  0000   PINS & NEEDLES:  = = = =                              ^ ^ ^                       0000              = = = =                                    ^ ^ ^                       0000            = = = =      BURNING:  XXXX   STABBING: ////                  XXXX                ////                         XXXX          ////     Please cori the line below indicating your degree of pain right now  with 0 being no pain 10 being the worst pain possible.                                          0             1             2              3             4              5              6              7             8             9             10         Patient Signature:

## (undated) NOTE — LETTER
300 [facility]   Date:   11/10/2022     Name:   Javon Pérez    YOB: 1955   MRN:   [id number]       WHERE IS YOUR PAIN NOW? George the areas on your body where you feel the described sensations. Use the appropriate symbol. Tommie Leslie the areas of radiation. Include all affected areas. Just to complete the picture, please draw in the face. ACHE:  ^ ^ ^   NUMBNESS:  0000   PINS & NEEDLES:  = = = =                              ^ ^ ^                       0000              = = = =                                    ^ ^ ^                       0000            = = = =      BURNING:  XXXX   STABBING: ////                  XXXX                ////                         XXXX          ////     Please george the line below indicating your degree of pain right now  with 0 being no pain 10 being the worst pain possible.                                          0             1             2              3             4              5              6              7             8             9             10         Patient Signature:

## (undated) NOTE — LETTER
[facility] [address] , [place]   Date:   1/23/2023     Name:   Jama Doctor    YOB: 1955   MRN:   [id number]       WHERE IS YOUR PAIN NOW? Cori the areas on your body where you feel the described sensations. Use the appropriate symbol. Adrian Montalvo the areas of radiation. Include all affected areas. Just to complete the picture, please draw in the face. ACHE:  ^ ^ ^   NUMBNESS:  0000   PINS & NEEDLES:  = = = =                              ^ ^ ^                       0000              = = = =                                    ^ ^ ^                       0000            = = = =      BURNING:  XXXX   STABBING: ////                  XXXX                ////                         XXXX          ////     Please cori the line below indicating your degree of pain right now  with 0 being no pain 10 being the worst pain possible.                                          0             1             2              3             4              5              6              7             8             9             10         Patient Signature:

## (undated) NOTE — LETTER
[address]  Authorization for Surgical Operation and Procedure                                                                                           1. I hereby authorize James Guzman MD, my physician and his/her assistants (if applicable), which may include medical students, residents, and/or fellows, to perform the following surgical operation/ procedure and administer such anesthesia as may be determined necessary by my physician: Operation/Procedure name (s) Right L4 - 5 transforaminal lumbar interbody fusion utilizing extreme lateral interbody fusion retractors and L2 -3 micro-decompression utilizing Metrix two system and microscope on Carina Escudero   2. I recognize that during the surgical operation/procedure, unforeseen conditions may necessitate additional or different procedures than those listed above. I, therefore, further authorize and request that the above-named surgeon, assistants, or designees perform such procedures as are, in their judgment, necessary and desirable. 3.   My surgeon/physician has discussed prior to my surgery the potential benefits, risks and side effects of this procedure; the likelihood of achieving goals; and potential problems that might occur during recuperation. They also discussed reasonable alternatives to the procedure, including risks, benefits, and side effects related to the alternatives and risks related to not receiving this procedure. I have had all my questions answered and I acknowledge that no guarantee has been made as to the result that may be obtained. 4.   Should the need arise during my operation/procedure, which includes change of level of care prior to discharge, I also consent to the administration of blood and/or blood products.   Further, I understand that despite careful testing and screening of blood or blood products by collecting agencies, I may still be subject to ill effects as a result of receiving a blood transfusion and/or blood products. The following are some, but not all, of the potential risks that can occur: fever and allergic reactions, hemolytic reactions, transmission of diseases such as Hepatitis, AIDS and Cytomegalovirus (CMV) and fluid overload. In the event that I wish to have an autologous transfusion of my own blood, or a directed donor transfusion, I will discuss this with my physician. Check only if Refusing Blood or Blood Products  I understand refusal of blood or blood products as deemed necessary by my physician may have serious consequences to my condition to include possible death. I hereby assume responsibility for my refusal and release the hospital, its personnel, and my physicians from any responsibility for the consequences of my refusal.    o  Refuse   5. I authorize the use of any specimen, organs, tissues, body parts or foreign objects that may be removed from my body during the operation/procedure for diagnosis, research or teaching purposes and their subsequent disposal by hospital authorities. I also authorize the release of specimen test results and/or written reports to my treating physician on the hospital medical staff or other referring or consulting physicians involved in my care, at the discretion of the Pathologist or my treating physician. 6.   I consent to the photographing or videotaping of the operations or procedures to be performed, including appropriate portions of my body for medical, scientific, or educational purposes, provided my identity is not revealed by the pictures or by descriptive texts accompanying them. If the procedure has been photographed/videotaped, the surgeon will obtain the original picture, image, videotape or CD.   The hospital will not be responsible for storage, release or maintenance of the picture, image, tape or CD.    7.   I consent to the presence of a  or observers in the operating room as deemed necessary by my physician or their designees. 8.   I recognize that in the event my procedure results in extended X-Ray/fluoroscopy time, I may develop a skin reaction. 9. If I have a Do Not Attempt Resuscitation (DNAR) order in place, that status will be suspended while in the operating room, procedural suite, and during the recovery period unless otherwise explicitly stated by me (or a person authorized to consent on my behalf). The surgeon or my attending physician will determine when the applicable recovery period ends for purposes of reinstating the DNAR order. 10. Patients having a sterilization procedure: I understand that if the procedure is successful the results will be permanent and it will therefore be impossible for me to inseminate, conceive, or bear children. I also understand that the procedure is intended to result in sterility, although the result has not been guaranteed. 11. I acknowledge that my physician has explained sedation/analgesia administration to me including the risk and benefits I consent to the administration of sedation/analgesia as may be necessary or desirable in the judgment of my physician.     I CERTIFY THAT I HAVE READ AND FULLY UNDERSTAND THE ABOVE CONSENT TO OPERATION and/or OTHER PROCEDURE.     _________________________________________ _________________________________     ___________________________________  Signature of Patient     Signature of Responsible Person                   Printed Name of Responsible Person                              _________________________________________ ______________________________        ___________________________________  Signature of Witness         Date  Time         Relationship to Patient    STATEMENT OF PHYSICIAN My signature below affirms that prior to the time of the procedure; I have explained to the patient and/or his/her legal representative, the risks and benefits involved in the proposed treatment and any reasonable alternative to the proposed treatment. I have also explained the risks and benefits involved in refusal of the proposed treatment and alternatives to the proposed treatment and have answered the patient's questions.  If I have a significant financial interest in a co-management agreement or a significant financial interest in any product or implant, or other significant relationship used in this procedure/surgery, I have disclosed this and had a discussion with my patient.     _______________________________________________________________ _____________________________  [facility])                                                                                         (Date)                                   (Time)  Patient Name: Jen Roblero    : 1955   Printed: 2023      Medical Record #: [id number]                                              Page 1 of 1

## (undated) NOTE — LETTER
[address] [facility]   Date:   12/29/2022     Name:   Marisela Medeiros    YOB: 1955   MRN:   [id number]       WHERE IS YOUR PAIN NOW? Cori the areas on your body where you feel the described sensations. Use the appropriate symbol. Minesh Salgado the areas of radiation. Include all affected areas. Just to complete the picture, please draw in the face. ACHE:  ^ ^ ^   NUMBNESS:  0000   PINS & NEEDLES:  = = = =                              ^ ^ ^                       0000              = = = =                                    ^ ^ ^                       0000            = = = =      BURNING:  XXXX   STABBING: ////                  XXXX                ////                         XXXX          ////     Please cori the line below indicating your degree of pain right now  with 0 being no pain 10 being the worst pain possible.                                          0             1             2              3             4              5              6              7             8             9             10         Patient Signature:

## (undated) NOTE — LETTER
AUTHORIZATION FOR SURGICAL OPERATION OR OTHER PROCEDURE    1. I hereby authorize Dr. Jonathon Sung and [facility] staff assigned to my case to perform the following operation and/or procedure at the [facility]:    _______________________________________________________________________________________________    Cortisone injection Left foot   _______________________________________________________________________________________________    2. My physician has explained the nature and purpose of the operation or other procedure, possible alternative methods of treatment, the risks involved, and the possibility of complication to me. I acknowledge that no guarantee has been made as to the result that may be obtained. 3.  I recognize that, during the course of this operation, or other procedure, unforseen conditions may necessitate additional or different procedure than those listed above. I, therefore, further authorize and request that the above named physician, his/her physician assistants or designees perform such procedures as are, in his/her professional opinion, necessary and desirable. 4.  Any tissue or organs removed in the operation or other procedure may be disposed of by and at the discretion of the [facility] and [facility] AT [facility]. 5.  I understand that in the event of a medical emergency, I will be transported by local paramedics to [facility] or other hospital emergency department. 6.  I certify that I have read and fully understand the above consent to operation and/or other procedure. 7.  I acknowledge that my physician has explained sedation/analgesia administration to me including the risks and benefits. I consent to the administration of sedation/analgesia as may be necessary or desirable in the judgement of my physician.     Witness signature: ___________________________________________________ Date:  ______/______/_____ Time:  ________ A. M.  P.M. Patient Name:  ______________________________________________________  (please print)      Patient signature:  ___________________________________________________             Relationship to Patient:           []  Parent    Responsible person                          []  Spouse  In case of minor or                    [] Other  _____________   Incompetent name:  __________________________________________________                               (please print)      _____________      Responsible person  In case of minor or  Incompetent signature:  _______________________________________________    Statement of Physician  My signature below affirms that prior to the time of the procedure, I have explained to the patient and/or his/her guardian, the risks and benefits involved in the proposed treatment and any reasonable alternative to the proposed treatment. I have also explained the risks and benefits involved in the refusal of the proposed treatment and have answered the patient's questions.                         Date:  ______/______/_______  Provider                      Signature:  __________________________________________________________       Time:  ___________ A.M    P.M.